# Patient Record
Sex: FEMALE | Race: WHITE | Employment: FULL TIME
[De-identification: names, ages, dates, MRNs, and addresses within clinical notes are randomized per-mention and may not be internally consistent; named-entity substitution may affect disease eponyms.]

---

## 2017-01-03 ENCOUNTER — OFFICE VISIT (OUTPATIENT)
Dept: PODIATRY | Facility: CLINIC | Age: 52
End: 2017-01-03

## 2017-01-03 VITALS
DIASTOLIC BLOOD PRESSURE: 78 MMHG | TEMPERATURE: 97.5 F | SYSTOLIC BLOOD PRESSURE: 132 MMHG | HEART RATE: 69 BPM | HEIGHT: 68 IN | RESPIRATION RATE: 20 BRPM

## 2017-01-03 DIAGNOSIS — Z78.9 PRESENCE OF SURGICAL INCISION: Primary | ICD-10-CM

## 2017-01-03 DIAGNOSIS — Z48.02 VISIT FOR SUTURE REMOVAL: ICD-10-CM

## 2017-01-03 PROCEDURE — 99024 POSTOP FOLLOW-UP VISIT: CPT | Performed by: PODIATRIST

## 2017-01-10 ENCOUNTER — OFFICE VISIT (OUTPATIENT)
Dept: PODIATRY | Facility: CLINIC | Age: 52
End: 2017-01-10

## 2017-01-10 VITALS
HEIGHT: 68 IN | HEART RATE: 75 BPM | RESPIRATION RATE: 18 BRPM | DIASTOLIC BLOOD PRESSURE: 84 MMHG | SYSTOLIC BLOOD PRESSURE: 130 MMHG | TEMPERATURE: 97.3 F

## 2017-01-10 DIAGNOSIS — Z47.89 ORTHOPEDIC AFTERCARE: ICD-10-CM

## 2017-01-10 DIAGNOSIS — Z78.9 PRESENCE OF SURGICAL INCISION: Primary | ICD-10-CM

## 2017-01-10 PROCEDURE — 29580 STRAPPING UNNA BOOT: CPT | Performed by: PODIATRIST

## 2017-01-10 PROCEDURE — 99024 POSTOP FOLLOW-UP VISIT: CPT | Performed by: PODIATRIST

## 2017-01-18 ENCOUNTER — OFFICE VISIT (OUTPATIENT)
Dept: PODIATRY | Facility: CLINIC | Age: 52
End: 2017-01-18

## 2017-01-18 VITALS
HEIGHT: 68 IN | HEART RATE: 76 BPM | DIASTOLIC BLOOD PRESSURE: 72 MMHG | TEMPERATURE: 97 F | SYSTOLIC BLOOD PRESSURE: 101 MMHG | RESPIRATION RATE: 18 BRPM

## 2017-01-18 DIAGNOSIS — Z47.89 ORTHOPEDIC AFTERCARE: ICD-10-CM

## 2017-01-18 DIAGNOSIS — Z78.9 PRESENCE OF SURGICAL INCISION: Primary | ICD-10-CM

## 2017-01-18 PROCEDURE — 99024 POSTOP FOLLOW-UP VISIT: CPT | Performed by: PODIATRIST

## 2017-01-31 ENCOUNTER — OFFICE VISIT (OUTPATIENT)
Dept: PODIATRY | Facility: CLINIC | Age: 52
End: 2017-01-31

## 2017-01-31 VITALS
DIASTOLIC BLOOD PRESSURE: 72 MMHG | HEIGHT: 68 IN | RESPIRATION RATE: 18 BRPM | HEART RATE: 73 BPM | TEMPERATURE: 96.5 F | SYSTOLIC BLOOD PRESSURE: 130 MMHG

## 2017-01-31 DIAGNOSIS — Z47.89 ORTHOPEDIC AFTERCARE: ICD-10-CM

## 2017-01-31 DIAGNOSIS — Z78.9 PRESENCE OF SURGICAL INCISION: Primary | ICD-10-CM

## 2017-01-31 PROCEDURE — 99024 POSTOP FOLLOW-UP VISIT: CPT | Performed by: PODIATRIST

## 2017-04-24 ENCOUNTER — APPOINTMENT (OUTPATIENT)
Dept: GENERAL RADIOLOGY | Age: 52
End: 2017-04-24
Payer: COMMERCIAL

## 2017-04-24 ENCOUNTER — HOSPITAL ENCOUNTER (EMERGENCY)
Age: 52
Discharge: HOME | End: 2017-04-24
Attending: FAMILY MEDICINE
Payer: COMMERCIAL

## 2017-04-24 VITALS
RESPIRATION RATE: 15 BRPM | OXYGEN SATURATION: 99 % | DIASTOLIC BLOOD PRESSURE: 75 MMHG | SYSTOLIC BLOOD PRESSURE: 152 MMHG | HEART RATE: 78 BPM
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
## 2017-04-24 DIAGNOSIS — S82.811A CLOSED TORUS FRACTURE OF PROXIMAL END OF RIGHT FIBULA, INITIAL ENCOUNTER: Primary | ICD-10-CM
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
## 2017-04-24 DIAGNOSIS — M25.511 ACUTE PAIN OF RIGHT SHOULDER: ICD-10-CM

## 2017-04-24 PROCEDURE — 29515 APPLICATION SHORT LEG SPLINT: CPT

## 2017-04-24 PROCEDURE — 99283 EMERGENCY DEPT VISIT LOW MDM: CPT

## 2017-04-24 PROCEDURE — 73560 X-RAY EXAM OF KNEE 1 OR 2: CPT

## 2017-04-24 RX ORDER — HYDROCODONE BITARTRATE AND ACETAMINOPHEN 5; 325 MG/1; MG/1
1 TABLET ORAL EVERY 6 HOURS PRN
Qty: 15 TABLET | Refills: 0 | Status: ON HOLD | OUTPATIENT
Start: 2017-04-24 | End: 2017-11-13 | Stop reason: ALTCHOICE

## 2017-04-24 ASSESSMENT — PAIN SCALES - GENERAL: PAINLEVEL_OUTOF10: 5

## 2017-04-24 ASSESSMENT — PAIN DESCRIPTION - ORIENTATION: ORIENTATION_2: RIGHT

## 2017-04-24 ASSESSMENT — PAIN DESCRIPTION - LOCATION
LOCATION: NECK
LOCATION_2: KNEE

## 2017-06-30 ENCOUNTER — HOSPITAL ENCOUNTER (OUTPATIENT)
Dept: GENERAL RADIOLOGY | Age: 52
Discharge: HOME | End: 2017-06-30
Payer: COMMERCIAL

## 2017-06-30 ENCOUNTER — OFFICE VISIT (OUTPATIENT)
Dept: PODIATRY | Age: 52
End: 2017-06-30
Payer: COMMERCIAL

## 2017-06-30 ENCOUNTER — HOSPITAL ENCOUNTER (OUTPATIENT)
Age: 52
Discharge: HOME | End: 2017-06-30
Payer: COMMERCIAL

## 2017-06-30 VITALS
SYSTOLIC BLOOD PRESSURE: 162 MMHG | HEIGHT: 68 IN | TEMPERATURE: 97.4 F | DIASTOLIC BLOOD PRESSURE: 94 MMHG | RESPIRATION RATE: 18 BRPM | HEART RATE: 79 BPM

## 2017-06-30 DIAGNOSIS — M79.672 PAIN IN LEFT FOOT: ICD-10-CM

## 2017-06-30 DIAGNOSIS — Z47.89 ORTHOPEDIC AFTERCARE: Primary | ICD-10-CM

## 2017-06-30 DIAGNOSIS — Z47.89 ORTHOPEDIC AFTERCARE: ICD-10-CM

## 2017-06-30 PROCEDURE — 99212 OFFICE O/P EST SF 10 MIN: CPT | Performed by: PODIATRIST

## 2017-06-30 PROCEDURE — 73630 X-RAY EXAM OF FOOT: CPT

## 2017-07-31 PROBLEM — Z12.11 COLON CANCER SCREENING: Status: ACTIVE | Noted: 2017-07-31

## 2017-08-07 ENCOUNTER — HOSPITAL ENCOUNTER (OUTPATIENT)
Dept: SLEEP CENTER | Age: 52
Discharge: HOME | End: 2017-08-07
Payer: COMMERCIAL

## 2017-08-07 PROCEDURE — 95810 POLYSOM 6/> YRS 4/> PARAM: CPT

## 2017-08-07 ASSESSMENT — SLEEP AND FATIGUE QUESTIONNAIRES
HOW LIKELY ARE YOU TO NOD OFF OR FALL ASLEEP WHILE SITTING AND READING: 1
ESS TOTAL SCORE: 6
HOW LIKELY ARE YOU TO NOD OFF OR FALL ASLEEP WHILE SITTING AND TALKING TO SOMEONE: 0
HOW LIKELY ARE YOU TO NOD OFF OR FALL ASLEEP WHILE SITTING INACTIVE IN A PUBLIC PLACE: 0
HOW LIKELY ARE YOU TO NOD OFF OR FALL ASLEEP IN A CAR, WHILE STOPPED FOR A FEW MINUTES IN TRAFFIC: 0
HOW LIKELY ARE YOU TO NOD OFF OR FALL ASLEEP WHILE SITTING QUIETLY AFTER LUNCH WITHOUT ALCOHOL: 0
HOW LIKELY ARE YOU TO NOD OFF OR FALL ASLEEP WHILE LYING DOWN TO REST IN THE AFTERNOON WHEN CIRCUMSTANCES PERMIT: 2
HOW LIKELY ARE YOU TO NOD OFF OR FALL ASLEEP WHILE WATCHING TV: 0
HOW LIKELY ARE YOU TO NOD OFF OR FALL ASLEEP WHEN YOU ARE A PASSENGER IN A CAR FOR AN HOUR WITHOUT A BREAK: 3

## 2017-09-06 ENCOUNTER — HOSPITAL ENCOUNTER (OUTPATIENT)
Dept: SLEEP CENTER | Age: 52
Discharge: HOME | End: 2017-09-06
Payer: COMMERCIAL

## 2017-09-06 DIAGNOSIS — G47.30 SLEEP APNEA, UNSPECIFIED TYPE: ICD-10-CM

## 2017-09-06 PROCEDURE — 95811 POLYSOM 6/>YRS CPAP 4/> PARM: CPT

## 2017-09-12 ENCOUNTER — HOSPITAL ENCOUNTER (OUTPATIENT)
Dept: WOMENS IMAGING | Age: 52
Discharge: HOME | End: 2017-09-12
Payer: COMMERCIAL

## 2017-09-12 ENCOUNTER — HOSPITAL ENCOUNTER (OUTPATIENT)
Age: 52
Discharge: HOME | End: 2017-09-12
Payer: COMMERCIAL

## 2017-09-12 DIAGNOSIS — Z00.00 WELLNESS EXAMINATION: ICD-10-CM

## 2017-09-12 LAB
ANION GAP SERPL CALCULATED.3IONS-SCNC: 13 MMOL/L (ref 9–17)
CHLORIDE BLD-SCNC: 103 MMOL/L (ref 98–107)
CO2: 28 MMOL/L (ref 20–31)
CREAT SERPL-MCNC: 1.2 MG/DL (ref 0.5–0.9)
EKG ATRIAL RATE: 80 BPM
EKG P AXIS: 48 DEGREES
EKG P-R INTERVAL: 166 MS
EKG Q-T INTERVAL: 360 MS
EKG QRS DURATION: 98 MS
EKG QTC CALCULATION (BAZETT): 415 MS
EKG R AXIS: -17 DEGREES
EKG T AXIS: 38 DEGREES
EKG VENTRICULAR RATE: 80 BPM
GFR AFRICAN AMERICAN: 57 ML/MIN
GFR NON-AFRICAN AMERICAN: 47 ML/MIN
GFR SERPL CREATININE-BSD FRML MDRD: ABNORMAL ML/MIN/{1.73_M2}
GFR SERPL CREATININE-BSD FRML MDRD: ABNORMAL ML/MIN/{1.73_M2}
GLUCOSE BLD-MCNC: 125 MG/DL (ref 70–99)
HCT VFR BLD CALC: 39.1 % (ref 36–46)
POTASSIUM SERPL-SCNC: 4.9 MMOL/L (ref 3.7–5.3)
SODIUM BLD-SCNC: 144 MMOL/L (ref 135–144)

## 2017-09-12 PROCEDURE — 85014 HEMATOCRIT: CPT

## 2017-09-12 PROCEDURE — 36415 COLL VENOUS BLD VENIPUNCTURE: CPT

## 2017-09-12 PROCEDURE — G0202 SCR MAMMO BI INCL CAD: HCPCS

## 2017-09-12 PROCEDURE — 80051 ELECTROLYTE PANEL: CPT

## 2017-09-12 PROCEDURE — 82947 ASSAY GLUCOSE BLOOD QUANT: CPT

## 2017-09-12 PROCEDURE — 82565 ASSAY OF CREATININE: CPT

## 2017-09-12 PROCEDURE — 93005 ELECTROCARDIOGRAM TRACING: CPT

## 2017-11-13 ENCOUNTER — ANESTHESIA (OUTPATIENT)
Dept: OPERATING ROOM | Age: 52
End: 2017-11-13
Payer: COMMERCIAL

## 2017-11-13 ENCOUNTER — ANESTHESIA EVENT (OUTPATIENT)
Dept: OPERATING ROOM | Age: 52
End: 2017-11-13
Payer: COMMERCIAL

## 2017-11-13 ENCOUNTER — HOSPITAL ENCOUNTER (OUTPATIENT)
Age: 52
Setting detail: OUTPATIENT SURGERY
Discharge: HOME | End: 2017-11-13
Attending: INTERNAL MEDICINE | Admitting: INTERNAL MEDICINE
Payer: COMMERCIAL

## 2017-11-13 VITALS
SYSTOLIC BLOOD PRESSURE: 95 MMHG | DIASTOLIC BLOOD PRESSURE: 49 MMHG | RESPIRATION RATE: 22 BRPM | OXYGEN SATURATION: 99 %

## 2017-11-13 VITALS
BODY MASS INDEX: 38.95 KG/M2 | DIASTOLIC BLOOD PRESSURE: 83 MMHG | HEIGHT: 68 IN | TEMPERATURE: 96.8 F | RESPIRATION RATE: 18 BRPM | HEART RATE: 70 BPM | SYSTOLIC BLOOD PRESSURE: 132 MMHG | WEIGHT: 257 LBS | OXYGEN SATURATION: 99 %

## 2017-11-13 LAB — GLUCOSE BLD-MCNC: 90 MG/DL (ref 74–100)

## 2017-11-13 PROCEDURE — 7100000010 HC PHASE II RECOVERY - FIRST 15 MIN: Performed by: INTERNAL MEDICINE

## 2017-11-13 PROCEDURE — 6360000002 HC RX W HCPCS: Performed by: NURSE ANESTHETIST, CERTIFIED REGISTERED

## 2017-11-13 PROCEDURE — 7100000011 HC PHASE II RECOVERY - ADDTL 15 MIN: Performed by: INTERNAL MEDICINE

## 2017-11-13 PROCEDURE — 2500000003 HC RX 250 WO HCPCS: Performed by: NURSE ANESTHETIST, CERTIFIED REGISTERED

## 2017-11-13 PROCEDURE — 3609027000 HC COLONOSCOPY: Performed by: INTERNAL MEDICINE

## 2017-11-13 PROCEDURE — 82947 ASSAY GLUCOSE BLOOD QUANT: CPT

## 2017-11-13 PROCEDURE — 45378 DIAGNOSTIC COLONOSCOPY: CPT | Performed by: INTERNAL MEDICINE

## 2017-11-13 PROCEDURE — 2580000003 HC RX 258: Performed by: INTERNAL MEDICINE

## 2017-11-13 PROCEDURE — 3700000000 HC ANESTHESIA ATTENDED CARE: Performed by: INTERNAL MEDICINE

## 2017-11-13 PROCEDURE — 3700000001 HC ADD 15 MINUTES (ANESTHESIA): Performed by: INTERNAL MEDICINE

## 2017-11-13 RX ORDER — SODIUM CHLORIDE, SODIUM LACTATE, POTASSIUM CHLORIDE, CALCIUM CHLORIDE 600; 310; 30; 20 MG/100ML; MG/100ML; MG/100ML; MG/100ML
INJECTION, SOLUTION INTRAVENOUS CONTINUOUS
Status: DISCONTINUED | OUTPATIENT
Start: 2017-11-13 | End: 2017-11-13 | Stop reason: HOSPADM

## 2017-11-13 RX ORDER — PROPOFOL 10 MG/ML
INJECTION, EMULSION INTRAVENOUS PRN
Status: DISCONTINUED | OUTPATIENT
Start: 2017-11-13 | End: 2017-11-13 | Stop reason: SDUPTHER

## 2017-11-13 RX ORDER — MIDAZOLAM HYDROCHLORIDE 1 MG/ML
INJECTION INTRAMUSCULAR; INTRAVENOUS PRN
Status: DISCONTINUED | OUTPATIENT
Start: 2017-11-13 | End: 2017-11-13 | Stop reason: SDUPTHER

## 2017-11-13 RX ORDER — LIDOCAINE HYDROCHLORIDE 20 MG/ML
INJECTION, SOLUTION INFILTRATION; PERINEURAL PRN
Status: DISCONTINUED | OUTPATIENT
Start: 2017-11-13 | End: 2017-11-13 | Stop reason: SDUPTHER

## 2017-11-13 RX ORDER — FENTANYL CITRATE 50 UG/ML
INJECTION, SOLUTION INTRAMUSCULAR; INTRAVENOUS PRN
Status: DISCONTINUED | OUTPATIENT
Start: 2017-11-13 | End: 2017-11-13 | Stop reason: SDUPTHER

## 2017-11-13 RX ADMIN — PROPOFOL 50 MG: 10 INJECTION, EMULSION INTRAVENOUS at 08:20

## 2017-11-13 RX ADMIN — PROPOFOL 25 MG: 10 INJECTION, EMULSION INTRAVENOUS at 08:15

## 2017-11-13 RX ADMIN — FENTANYL CITRATE 25 MCG: 50 INJECTION INTRAMUSCULAR; INTRAVENOUS at 08:04

## 2017-11-13 RX ADMIN — PROPOFOL 30 MG: 10 INJECTION, EMULSION INTRAVENOUS at 08:09

## 2017-11-13 RX ADMIN — PROPOFOL 50 MG: 10 INJECTION, EMULSION INTRAVENOUS at 08:04

## 2017-11-13 RX ADMIN — LIDOCAINE HYDROCHLORIDE 100 MG: 20 INJECTION, SOLUTION INFILTRATION; PERINEURAL at 08:04

## 2017-11-13 RX ADMIN — FENTANYL CITRATE 25 MCG: 50 INJECTION INTRAMUSCULAR; INTRAVENOUS at 08:14

## 2017-11-13 RX ADMIN — PROPOFOL 50 MG: 10 INJECTION, EMULSION INTRAVENOUS at 08:17

## 2017-11-13 RX ADMIN — PROPOFOL 50 MG: 10 INJECTION, EMULSION INTRAVENOUS at 08:13

## 2017-11-13 RX ADMIN — PROPOFOL 50 MG: 10 INJECTION, EMULSION INTRAVENOUS at 08:07

## 2017-11-13 RX ADMIN — MIDAZOLAM HYDROCHLORIDE 2 MG: 1 INJECTION, SOLUTION INTRAMUSCULAR; INTRAVENOUS at 08:04

## 2017-11-13 RX ADMIN — SODIUM CHLORIDE, POTASSIUM CHLORIDE, SODIUM LACTATE AND CALCIUM CHLORIDE: 600; 310; 30; 20 INJECTION, SOLUTION INTRAVENOUS at 07:46

## 2017-11-13 RX ADMIN — FENTANYL CITRATE 50 MCG: 50 INJECTION INTRAMUSCULAR; INTRAVENOUS at 08:08

## 2017-11-13 ASSESSMENT — PAIN SCALES - GENERAL
PAINLEVEL_OUTOF10: 0

## 2017-11-13 ASSESSMENT — PAIN - FUNCTIONAL ASSESSMENT: PAIN_FUNCTIONAL_ASSESSMENT: 0-10

## 2018-01-05 ENCOUNTER — OFFICE VISIT (OUTPATIENT)
Dept: PRIMARY CARE CLINIC | Age: 53
End: 2018-01-05
Payer: COMMERCIAL

## 2018-01-05 ENCOUNTER — HOSPITAL ENCOUNTER (OUTPATIENT)
Age: 53
Setting detail: SPECIMEN
Discharge: HOME | End: 2018-01-05
Payer: COMMERCIAL

## 2018-01-05 VITALS
RESPIRATION RATE: 20 BRPM | SYSTOLIC BLOOD PRESSURE: 121 MMHG | HEART RATE: 87 BPM | WEIGHT: 263.7 LBS | BODY MASS INDEX: 40.1 KG/M2 | TEMPERATURE: 100.3 F | DIASTOLIC BLOOD PRESSURE: 77 MMHG

## 2018-01-05 DIAGNOSIS — J02.9 SORE THROAT: ICD-10-CM

## 2018-01-05 DIAGNOSIS — J02.9 SORE THROAT: Primary | ICD-10-CM

## 2018-01-05 DIAGNOSIS — J10.1 INFLUENZA A: ICD-10-CM

## 2018-01-05 LAB
INFLUENZA A ANTIBODY: POSITIVE
INFLUENZA B ANTIBODY: NEGATIVE
S PYO AG THROAT QL: NORMAL

## 2018-01-05 PROCEDURE — 87880 STREP A ASSAY W/OPTIC: CPT | Performed by: NURSE PRACTITIONER

## 2018-01-05 PROCEDURE — 99213 OFFICE O/P EST LOW 20 MIN: CPT | Performed by: NURSE PRACTITIONER

## 2018-01-05 PROCEDURE — 87651 STREP A DNA AMP PROBE: CPT

## 2018-01-05 PROCEDURE — 87804 INFLUENZA ASSAY W/OPTIC: CPT | Performed by: NURSE PRACTITIONER

## 2018-01-05 RX ORDER — OSELTAMIVIR PHOSPHATE 75 MG/1
75 CAPSULE ORAL 2 TIMES DAILY
Qty: 10 CAPSULE | Refills: 0 | Status: SHIPPED | OUTPATIENT
Start: 2018-01-05 | End: 2018-01-05 | Stop reason: DRUGHIGH

## 2018-01-05 RX ORDER — OSELTAMIVIR PHOSPHATE 30 MG/1
30 CAPSULE ORAL 2 TIMES DAILY
Qty: 10 CAPSULE | Refills: 0 | Status: SHIPPED | OUTPATIENT
Start: 2018-01-05 | End: 2019-08-14 | Stop reason: ALTCHOICE

## 2018-01-05 ASSESSMENT — ENCOUNTER SYMPTOMS
COUGH: 1
ABDOMINAL PAIN: 0
WHEEZING: 0
EYES NEGATIVE: 1
TROUBLE SWALLOWING: 0
VOMITING: 0
GASTROINTESTINAL NEGATIVE: 1
RHINORRHEA: 1
SORE THROAT: 1
SHORTNESS OF BREATH: 0

## 2018-01-05 NOTE — PATIENT INSTRUCTIONS
Patient Education        Influenza (Flu): Care Instructions  Your Care Instructions    Influenza (flu) is an infection in the lungs and breathing passages. It is caused by the influenza virus. There are different strains, or types, of the flu virus from year to year. Unlike the common cold, the flu comes on suddenly and the symptoms, such as a cough, congestion, fever, chills, fatigue, aches, and pains, are more severe. These symptoms may last up to 10 days. Although the flu can make you feel very sick, it usually doesn't cause serious health problems. Home treatment is usually all you need for flu symptoms. But your doctor may prescribe antiviral medicine to prevent other health problems, such as pneumonia, from developing. Older people and those who have a long-term health condition, such as lung disease, are most at risk for having pneumonia or other health problems. Follow-up care is a key part of your treatment and safety. Be sure to make and go to all appointments, and call your doctor if you are having problems. It's also a good idea to know your test results and keep a list of the medicines you take. How can you care for yourself at home? · Get plenty of rest.  · Drink plenty of fluids, enough so that your urine is light yellow or clear like water. If you have kidney, heart, or liver disease and have to limit fluids, talk with your doctor before you increase the amount of fluids you drink. · Take an over-the-counter pain medicine if needed, such as acetaminophen (Tylenol), ibuprofen (Advil, Motrin), or naproxen (Aleve), to relieve fever, headache, and muscle aches. Read and follow all instructions on the label. No one younger than 20 should take aspirin. It has been linked to Reye syndrome, a serious illness. · Do not smoke. Smoking can make the flu worse. If you need help quitting, talk to your doctor about stop-smoking programs and medicines.  These can increase your chances of quitting for your doctor if:  ? · You begin to get better and then get worse. ? · You are not getting better after 1 week. Where can you learn more? Go to https://IntelliGeneScanpepiceweb.[employer]. org and sign in to your Learn It Live account. Enter P629 in the [employer] box to learn more about \"Influenza (Flu): Care Instructions. \"     If you do not have an account, please click on the \"Sign Up Now\" link. Current as of: May 12, 2017  Content Version: 11.5  © 4615-0932 Healthwise, Incorporated. Care instructions adapted under license by [facility] ([facility]). If you have questions about a medical condition or this instruction, always ask your healthcare professional. [address] any warranty or liability for your use of this information.

## 2018-01-05 NOTE — LETTER
[address] [address]  Phone: [phone]  Fax: [phone] Ami Maher NP        January 5, 2018     Patient: Magui Meyer   YOB: 1965   Date of Visit: 1/5/2018       To Whom it May Concern:    Gabriella Noriega was seen in my clinic on 1/5/2018. She may return to work on 01/08/2018. If you have any questions or concerns, please don't hesitate to call.     Sincerely,         Drea Fuentes NP

## 2018-01-05 NOTE — PROGRESS NOTES
[facility] PHYSICIANS  [facility] PRIMARY [facility]  [address] [facility] [address]  Dept: [phone]  Dept Fax: [phone]    Magui Meyer is a 47 y.o. female who presents to the [facility] today for her medical conditions/complaints as noted below. Magui Meyer is c/o of Pharyngitis      HPI:     Denies recent antibiotic use. Denies heart or lung problems. Denies kidney or liver problems. Pharyngitis   This is a new problem. Episode onset: Wednesday, 2 days  The problem occurs constantly. The problem has been gradually worsening. Associated symptoms include chills, coughing, fatigue and a sore throat. Pertinent negatives include no abdominal pain, congestion, fever, headaches, myalgias, neck pain, rash or vomiting. Associated symptoms comments: Daughter positive with strep. The symptoms are aggravated by eating and drinking. Treatments tried: Theraflu, cough drops and throat spray. The treatment provided mild relief.        Past Medical History:   Diagnosis Date    Endometriosis     Headache(784.0)     Hyperlipidemia     Hypertension     Obesity     Restless legs syndrome     Type 2 diabetes mellitus (HCC)         Current Outpatient Prescriptions   Medication Sig Dispense Refill    oseltamivir (TAMIFLU) 30 MG capsule Take 1 capsule by mouth 2 times daily for 5 days 10 capsule 0    losartan (COZAAR) 100 MG tablet TAKE ONE TABLET BY MOUTH DAILY 30 tablet 9    L-Methylfolate-Algae-B12-B6 (METANX PO) Take by mouth daily      MELATONIN PO Take 10 mg by mouth      allopurinol (ZYLOPRIM) 100 MG tablet TAKE ONE TABLET BY MOUTH DAILY 30 tablet 11    aspirin 81 MG tablet Take 81 mg by mouth daily      Calcium-Vitamin D (CALTRATE 600 PLUS-VIT D PO) Take 1 tablet by mouth daily      Multiple Vitamins-Minerals (THERAPEUTIC MULTIVITAMIN-MINERALS) tablet Take 1 tablet by mouth daily      b complex vitamins capsule Take 1 capsule by mouth daily      vitamin D (CHOLECALCIFEROL) 1000 UNIT TABS tablet Take 1,000 Units by mouth daily      Insulin Glargine (TOUJEO SOLOSTAR) 300 UNIT/ML SOPN Inject 50 Units into the skin nightly  3 Pen     imipramine (TOFRANIL) 50 MG tablet Take 75 mg by mouth nightly       atorvastatin (LIPITOR) 80 MG tablet TAKE ONE TABLET BY MOUTH EVERY DAY (Patient taking differently: 40 mg TAKE ONE TABLET BY MOUTH EVERY DAY) 30 tablet 11    INVOKANA 300 MG TABS tablet Take 300 mg by mouth daily       ONE TOUCH ULTRA TEST strip 1 each daily       LYRICA 300 MG capsule Take 300 mg by mouth nightly       metoprolol (TOPROL-XL) 50 MG XL tablet Take 50 mg by mouth 2 times daily       metFORMIN ER (GLUCOPHAGE-XR) 500 MG XR tablet Take 1,000 mg by mouth 2 times daily       CLICKFINE PEN NEEDLES 31G X 8 MM MISC 1 each daily       exenatide (BYETTA 10 MCG PEN) 10 MCG/0.04ML injection Inject 10 mcg into the skin 2 times daily (with meals)       glimepiride (AMARYL) 4 MG tablet Take 8 mg by mouth every morning (before breakfast)        No current facility-administered medications for this visit. No Known Allergies    Subjective:      Review of Systems   Constitutional: Positive for activity change, appetite change, chills and fatigue. Negative for fever. HENT: Positive for rhinorrhea and sore throat. Negative for congestion, ear discharge, ear pain, sinus pain, sinus pressure, sneezing and trouble swallowing. Eyes: Negative. Negative for visual disturbance. Respiratory: Positive for cough. Negative for shortness of breath and wheezing. Cardiovascular: Negative. Gastrointestinal: Negative. Negative for abdominal pain and vomiting. Genitourinary: Negative. Musculoskeletal: Negative. Negative for myalgias and neck pain. Skin: Negative. Negative for rash. Neurological: Negative. Negative for headaches. Objective:     Physical Exam   Constitutional: She is oriented to person, place, and time.  She appears well-developed and well-nourished. She is cooperative. Non-toxic appearance. She appears ill. No distress. Low grade fever in office  Appears well hydrated and non toxic. Sitting upright on exam table without distress. Respirations are regular, non labored and quiet. HENT:   Head: Normocephalic and atraumatic. Right Ear: Tympanic membrane, external ear and ear canal normal.   Left Ear: Tympanic membrane, external ear and ear canal normal.   Nose: Mucosal edema and rhinorrhea present. Right sinus exhibits no maxillary sinus tenderness and no frontal sinus tenderness. Left sinus exhibits no maxillary sinus tenderness and no frontal sinus tenderness. Mouth/Throat: Uvula is midline. No trismus in the jaw. No uvula swelling. Posterior oropharyngeal erythema present. No oropharyngeal exudate or posterior oropharyngeal edema. Eyes: Conjunctivae and EOM are normal. Pupils are equal, round, and reactive to light. Neck: Normal range of motion. Neck supple. No tracheal deviation present. No thyromegaly present. Cardiovascular: Normal rate, regular rhythm, normal heart sounds and intact distal pulses. Exam reveals no gallop and no friction rub. No murmur heard. Pulses:       Radial pulses are 2+ on the left side. Pulmonary/Chest: Effort normal and breath sounds normal. No accessory muscle usage. No tachypnea. No respiratory distress. She has no decreased breath sounds. She has no wheezes. She has no rhonchi. She has no rales. No cough, no wheeze, no distress  Breath sounds are clear to auscultation over posterior bilateral fields. Chest expansion is symmetrical with non-restricted air movement. Musculoskeletal: Normal range of motion. She exhibits no edema or tenderness. Lymphadenopathy:     She has no cervical adenopathy. Neurological: She is alert and oriented to person, place, and time. No cranial nerve deficit. She exhibits normal muscle tone. Coordination normal.   Skin: Skin is warm and dry.  No rash (around 1/7/2018). Orders Placed This Encounter   Medications    DISCONTD: oseltamivir (TAMIFLU) 75 MG capsule     Sig: Take 1 capsule by mouth 2 times daily for 5 days     Dispense:  10 capsule     Refill:  0    oseltamivir (TAMIFLU) 30 MG capsule     Sig: Take 1 capsule by mouth 2 times daily for 5 days     Dispense:  10 capsule     Refill:  0        She is asked to follow-up if symptoms persist or worsen. All patient questions answered. Pt voiced understanding.       Electronically signed by Pippa Maloney NP on 1/8/2018 at 9:57 AM

## 2018-01-06 LAB
DIRECT EXAM: NORMAL
DIRECT EXAM: NORMAL
Lab: NORMAL
SPECIMEN DESCRIPTION: NORMAL
SPECIMEN DESCRIPTION: NORMAL
STATUS: NORMAL

## 2018-01-08 ENCOUNTER — TELEPHONE (OUTPATIENT)
Dept: PRIMARY CARE CLINIC | Age: 53
End: 2018-01-08

## 2018-01-08 ASSESSMENT — ENCOUNTER SYMPTOMS
SINUS PAIN: 0
SINUS PRESSURE: 0

## 2018-01-30 ENCOUNTER — HOSPITAL ENCOUNTER (OUTPATIENT)
Dept: GENERAL RADIOLOGY | Age: 53
Discharge: HOME | DRG: 617 | End: 2018-02-01
Payer: COMMERCIAL

## 2018-01-30 ENCOUNTER — HOSPITAL ENCOUNTER (OUTPATIENT)
Age: 53
Discharge: HOME | DRG: 617 | End: 2018-02-01
Payer: COMMERCIAL

## 2018-01-30 ENCOUNTER — OFFICE VISIT (OUTPATIENT)
Dept: PODIATRY | Age: 53
End: 2018-01-30
Payer: COMMERCIAL

## 2018-01-30 ENCOUNTER — HOSPITAL ENCOUNTER (INPATIENT)
Age: 53
LOS: 3 days | Discharge: HOME | DRG: 617 | End: 2018-02-02
Attending: PODIATRIST | Admitting: PODIATRIST
Payer: COMMERCIAL

## 2018-01-30 ENCOUNTER — APPOINTMENT (OUTPATIENT)
Dept: GENERAL RADIOLOGY | Age: 53
DRG: 617 | End: 2018-01-30
Attending: PODIATRIST
Payer: COMMERCIAL

## 2018-01-30 ENCOUNTER — APPOINTMENT (OUTPATIENT)
Dept: LAB | Age: 53
DRG: 617 | End: 2018-01-30
Attending: PODIATRIST
Payer: COMMERCIAL

## 2018-01-30 VITALS
DIASTOLIC BLOOD PRESSURE: 77 MMHG | HEART RATE: 76 BPM | BODY MASS INDEX: 39.83 KG/M2 | SYSTOLIC BLOOD PRESSURE: 131 MMHG | WEIGHT: 262.8 LBS | TEMPERATURE: 97.6 F | HEIGHT: 68 IN

## 2018-01-30 DIAGNOSIS — L02.611 ABSCESS OF GREAT TOE OF RIGHT FOOT: ICD-10-CM

## 2018-01-30 DIAGNOSIS — M86.171 ACUTE OSTEOMYELITIS OF TOE OF RIGHT FOOT (HCC): ICD-10-CM

## 2018-01-30 DIAGNOSIS — L02.611 ABSCESS OF GREAT TOE OF RIGHT FOOT: Primary | ICD-10-CM

## 2018-01-30 DIAGNOSIS — M86.9 OSTEOMYELITIS OF RIGHT FOOT, UNSPECIFIED TYPE (HCC): ICD-10-CM

## 2018-01-30 PROBLEM — N39.0 UTI (URINARY TRACT INFECTION): Status: ACTIVE | Noted: 2018-01-30

## 2018-01-30 LAB
-: ABNORMAL
ALBUMIN SERPL-MCNC: 3.7 G/DL (ref 3.5–5.2)
ALBUMIN/GLOBULIN RATIO: 1.1 (ref 1–2.5)
ALP BLD-CCNC: 132 U/L (ref 35–104)
ALT SERPL-CCNC: 18 U/L (ref 5–33)
AMORPHOUS: ABNORMAL
ANION GAP SERPL CALCULATED.3IONS-SCNC: 15 MMOL/L (ref 9–17)
AST SERPL-CCNC: 16 U/L
BACTERIA: ABNORMAL
BILIRUB SERPL-MCNC: 0.45 MG/DL (ref 0.3–1.2)
BILIRUBIN URINE: NEGATIVE
BUN BLDV-MCNC: 37 MG/DL (ref 6–20)
BUN/CREAT BLD: 28 (ref 9–20)
CALCIUM SERPL-MCNC: 9.8 MG/DL (ref 8.6–10.4)
CASTS UA: ABNORMAL /LPF
CHLORIDE BLD-SCNC: 98 MMOL/L (ref 98–107)
CO2: 27 MMOL/L (ref 20–31)
COLOR: YELLOW
COMMENT UA: ABNORMAL
CREAT SERPL-MCNC: 1.33 MG/DL (ref 0.5–0.9)
CRYSTALS, UA: ABNORMAL /HPF
EKG ATRIAL RATE: 71 BPM
EKG P AXIS: 23 DEGREES
EKG P-R INTERVAL: 158 MS
EKG Q-T INTERVAL: 382 MS
EKG QRS DURATION: 96 MS
EKG QTC CALCULATION (BAZETT): 415 MS
EKG R AXIS: -5 DEGREES
EKG T AXIS: 2 DEGREES
EKG VENTRICULAR RATE: 71 BPM
EPITHELIAL CELLS UA: ABNORMAL /HPF (ref 0–25)
GFR AFRICAN AMERICAN: 51 ML/MIN
GFR NON-AFRICAN AMERICAN: 42 ML/MIN
GFR SERPL CREATININE-BSD FRML MDRD: ABNORMAL ML/MIN/{1.73_M2}
GFR SERPL CREATININE-BSD FRML MDRD: ABNORMAL ML/MIN/{1.73_M2}
GLUCOSE BLD-MCNC: 100 MG/DL (ref 70–99)
GLUCOSE BLD-MCNC: 145 MG/DL (ref 74–100)
GLUCOSE BLD-MCNC: 162 MG/DL (ref 74–100)
GLUCOSE URINE: ABNORMAL
KETONES, URINE: NEGATIVE
LACTIC ACID, WHOLE BLOOD: NORMAL MMOL/L (ref 0.7–2.1)
LACTIC ACID: 1.4 MMOL/L (ref 0.5–2.2)
LEUKOCYTE ESTERASE, URINE: ABNORMAL
MUCUS: ABNORMAL
NITRITE, URINE: POSITIVE
OTHER OBSERVATIONS UA: ABNORMAL
PH UA: 5.5 (ref 5–9)
POTASSIUM SERPL-SCNC: 4.7 MMOL/L (ref 3.7–5.3)
PROCALCITONIN: 0.14 NG/ML
PROTEIN UA: NEGATIVE
RBC UA: ABNORMAL /HPF (ref 0–2)
RENAL EPITHELIAL, UA: ABNORMAL /HPF
SEDIMENTATION RATE, ERYTHROCYTE: 70 MM (ref 0–20)
SODIUM BLD-SCNC: 140 MMOL/L (ref 135–144)
SPECIFIC GRAVITY UA: 1.02 (ref 1.01–1.02)
TOTAL PROTEIN: 7.2 G/DL (ref 6.4–8.3)
TRICHOMONAS: ABNORMAL
TURBIDITY: CLEAR
URINE HGB: NEGATIVE
UROBILINOGEN, URINE: NORMAL
WBC UA: ABNORMAL /HPF (ref 0–5)
YEAST: ABNORMAL

## 2018-01-30 PROCEDURE — 87075 CULTR BACTERIA EXCEPT BLOOD: CPT

## 2018-01-30 PROCEDURE — 81001 URINALYSIS AUTO W/SCOPE: CPT

## 2018-01-30 PROCEDURE — 85651 RBC SED RATE NONAUTOMATED: CPT

## 2018-01-30 PROCEDURE — 87040 BLOOD CULTURE FOR BACTERIA: CPT

## 2018-01-30 PROCEDURE — 71045 X-RAY EXAM CHEST 1 VIEW: CPT

## 2018-01-30 PROCEDURE — 83605 ASSAY OF LACTIC ACID: CPT

## 2018-01-30 PROCEDURE — 6360000002 HC RX W HCPCS: Performed by: PODIATRIST

## 2018-01-30 PROCEDURE — 2580000003 HC RX 258: Performed by: PODIATRIST

## 2018-01-30 PROCEDURE — 6370000000 HC RX 637 (ALT 250 FOR IP): Performed by: PHYSICIAN ASSISTANT

## 2018-01-30 PROCEDURE — 87205 SMEAR GRAM STAIN: CPT

## 2018-01-30 PROCEDURE — 87186 SC STD MICRODIL/AGAR DIL: CPT

## 2018-01-30 PROCEDURE — 73660 X-RAY EXAM OF TOE(S): CPT

## 2018-01-30 PROCEDURE — 10061 I&D ABSCESS COMP/MULTIPLE: CPT | Performed by: PODIATRIST

## 2018-01-30 PROCEDURE — 84145 PROCALCITONIN (PCT): CPT

## 2018-01-30 PROCEDURE — 82947 ASSAY GLUCOSE BLOOD QUANT: CPT

## 2018-01-30 PROCEDURE — 93005 ELECTROCARDIOGRAM TRACING: CPT

## 2018-01-30 PROCEDURE — 87070 CULTURE OTHR SPECIMN AEROBIC: CPT

## 2018-01-30 PROCEDURE — 80053 COMPREHEN METABOLIC PANEL: CPT

## 2018-01-30 PROCEDURE — 86403 PARTICLE AGGLUT ANTBDY SCRN: CPT

## 2018-01-30 PROCEDURE — 1200000000 HC SEMI PRIVATE

## 2018-01-30 PROCEDURE — 36415 COLL VENOUS BLD VENIPUNCTURE: CPT

## 2018-01-30 PROCEDURE — 99222 1ST HOSP IP/OBS MODERATE 55: CPT | Performed by: PODIATRIST

## 2018-01-30 RX ORDER — SODIUM CHLORIDE 0.9 % (FLUSH) 0.9 %
10 SYRINGE (ML) INJECTION EVERY 12 HOURS SCHEDULED
Status: DISCONTINUED | OUTPATIENT
Start: 2018-01-30 | End: 2018-02-02 | Stop reason: HOSPADM

## 2018-01-30 RX ORDER — M-VIT,TX,IRON,MINS/CALC/FOLIC 27MG-0.4MG
1 TABLET ORAL DAILY
Status: DISCONTINUED | OUTPATIENT
Start: 2018-01-31 | End: 2018-02-02 | Stop reason: HOSPADM

## 2018-01-30 RX ORDER — METOPROLOL SUCCINATE 50 MG/1
50 TABLET, EXTENDED RELEASE ORAL 2 TIMES DAILY
Status: DISCONTINUED | OUTPATIENT
Start: 2018-01-30 | End: 2018-02-02 | Stop reason: HOSPADM

## 2018-01-30 RX ORDER — IMIPRAMINE HCL 25 MG
75 TABLET ORAL NIGHTLY
Status: DISCONTINUED | OUTPATIENT
Start: 2018-01-30 | End: 2018-02-02 | Stop reason: HOSPADM

## 2018-01-30 RX ORDER — ONDANSETRON 2 MG/ML
4 INJECTION INTRAMUSCULAR; INTRAVENOUS EVERY 6 HOURS PRN
Status: DISCONTINUED | OUTPATIENT
Start: 2018-01-30 | End: 2018-02-02 | Stop reason: HOSPADM

## 2018-01-30 RX ORDER — ATORVASTATIN CALCIUM 40 MG/1
80 TABLET, FILM COATED ORAL NIGHTLY
Status: DISCONTINUED | OUTPATIENT
Start: 2018-01-30 | End: 2018-02-02 | Stop reason: HOSPADM

## 2018-01-30 RX ORDER — ALLOPURINOL 100 MG/1
100 TABLET ORAL DAILY
Status: DISCONTINUED | OUTPATIENT
Start: 2018-01-30 | End: 2018-02-02 | Stop reason: HOSPADM

## 2018-01-30 RX ORDER — ACETAMINOPHEN 325 MG/1
650 TABLET ORAL EVERY 4 HOURS PRN
Status: DISCONTINUED | OUTPATIENT
Start: 2018-01-30 | End: 2018-02-02 | Stop reason: HOSPADM

## 2018-01-30 RX ORDER — SODIUM CHLORIDE 0.9 % (FLUSH) 0.9 %
10 SYRINGE (ML) INJECTION PRN
Status: DISCONTINUED | OUTPATIENT
Start: 2018-01-30 | End: 2018-02-02 | Stop reason: HOSPADM

## 2018-01-30 RX ORDER — NICOTINE POLACRILEX 4 MG
15 LOZENGE BUCCAL PRN
Status: DISCONTINUED | OUTPATIENT
Start: 2018-01-30 | End: 2018-02-02 | Stop reason: HOSPADM

## 2018-01-30 RX ORDER — DEXTROSE MONOHYDRATE 50 MG/ML
100 INJECTION, SOLUTION INTRAVENOUS PRN
Status: DISCONTINUED | OUTPATIENT
Start: 2018-01-30 | End: 2018-02-02 | Stop reason: HOSPADM

## 2018-01-30 RX ORDER — DEXTROSE MONOHYDRATE 25 G/50ML
12.5 INJECTION, SOLUTION INTRAVENOUS PRN
Status: DISCONTINUED | OUTPATIENT
Start: 2018-01-30 | End: 2018-02-02 | Stop reason: HOSPADM

## 2018-01-30 RX ORDER — PREGABALIN 75 MG/1
300 CAPSULE ORAL NIGHTLY
Status: DISCONTINUED | OUTPATIENT
Start: 2018-01-30 | End: 2018-02-02 | Stop reason: HOSPADM

## 2018-01-30 RX ORDER — LOSARTAN POTASSIUM 50 MG/1
100 TABLET ORAL DAILY
Status: DISCONTINUED | OUTPATIENT
Start: 2018-01-30 | End: 2018-02-02 | Stop reason: HOSPADM

## 2018-01-30 RX ORDER — UREA 10 %
3 LOTION (ML) TOPICAL NIGHTLY PRN
Status: DISCONTINUED | OUTPATIENT
Start: 2018-01-30 | End: 2018-02-02 | Stop reason: HOSPADM

## 2018-01-30 RX ADMIN — IMIPRAMINE HYDROCHLORIDE 75 MG: 25 TABLET ORAL at 20:21

## 2018-01-30 RX ADMIN — ALLOPURINOL 100 MG: 100 TABLET ORAL at 16:00

## 2018-01-30 RX ADMIN — PREGABALIN 300 MG: 75 CAPSULE ORAL at 20:21

## 2018-01-30 RX ADMIN — INSULIN GLARGINE 20 UNITS: 100 INJECTION, SOLUTION SUBCUTANEOUS at 20:26

## 2018-01-30 RX ADMIN — METOPROLOL SUCCINATE 50 MG: 50 TABLET, FILM COATED, EXTENDED RELEASE ORAL at 20:21

## 2018-01-30 RX ADMIN — LOSARTAN POTASSIUM 100 MG: 50 TABLET ORAL at 16:36

## 2018-01-30 RX ADMIN — INSULIN LISPRO 2 UNITS: 100 INJECTION, SOLUTION INTRAVENOUS; SUBCUTANEOUS at 17:21

## 2018-01-30 RX ADMIN — DEXTROSE MONOHYDRATE 3.38 G: 50 INJECTION, SOLUTION INTRAVENOUS at 16:00

## 2018-01-30 RX ADMIN — ATORVASTATIN CALCIUM 80 MG: 40 TABLET, FILM COATED ORAL at 20:21

## 2018-01-30 RX ADMIN — DEXTROSE MONOHYDRATE 1500 MG: 50 INJECTION, SOLUTION INTRAVENOUS at 14:03

## 2018-01-30 ASSESSMENT — PAIN SCALES - GENERAL: PAINLEVEL_OUTOF10: 0

## 2018-01-30 NOTE — CONSULTS
Start Date End Date Taking?  Authorizing Provider   losartan (COZAAR) 100 MG tablet TAKE ONE TABLET BY MOUTH DAILY 12/19/17   Alyssa Maxwell MD   L-Methylfolate-Algae-B12-B6 (METANX PO) Take by mouth daily    Historical Provider, MD   MELATONIN PO Take 10 mg by mouth    Historical Provider, MD   allopurinol (ZYLOPRIM) 100 MG tablet TAKE ONE TABLET BY MOUTH DAILY 7/3/17   Alyssa Maxwell MD   Calcium-Vitamin D (CALTRATE 600 PLUS-VIT D PO) Take 1 tablet by mouth daily    Historical Provider, MD   Multiple Vitamins-Minerals (THERAPEUTIC MULTIVITAMIN-MINERALS) tablet Take 1 tablet by mouth daily    Historical Provider, MD   b complex vitamins capsule Take 1 capsule by mouth daily    Historical Provider, MD   vitamin D (CHOLECALCIFEROL) 1000 UNIT TABS tablet Take 1,000 Units by mouth daily    Historical Provider, MD   Insulin Glargine (TOUJEO SOLOSTAR) 300 UNIT/ML SOPN Inject 45 Units into the skin nightly  6/29/16   Alyssa Maxwell MD   imipramine (TOFRANIL) 50 MG tablet Take 75 mg by mouth nightly  11/30/15   Historical Provider, MD   atorvastatin (LIPITOR) 80 MG tablet TAKE ONE TABLET BY MOUTH EVERY DAY  Patient taking differently: 40 mg TAKE ONE TABLET BY MOUTH EVERY DAY 12/16/15   Alyssa Maxwell MD   INVOKANA 300 MG TABS tablet Take 300 mg by mouth daily  5/29/15   Historical Provider, MD   ONE TOUCH ULTRA TEST strip 1 each daily  3/27/15   Historical Provider, MD   LYRICA 300 MG capsule Take 300 mg by mouth nightly  5/30/15   Historical Provider, MD   metoprolol (TOPROL-XL) 50 MG XL tablet Take 50 mg by mouth 2 times daily  5/21/15   Historical Provider, MD   metFORMIN ER (GLUCOPHAGE-XR) 500 MG XR tablet Take 1,000 mg by mouth 2 times daily  5/21/15   Historical Provider, MD   CLICKFINE PEN NEEDLES 31G X 8 MM MISC 1 each daily  6/6/15   Historical Provider, MD   exenatide (BYETTA 10 MCG PEN) 10 MCG/0.04ML injection Inject 10 mcg into the skin 2 times daily (with meals)     Historical Provider, MD

## 2018-01-31 ENCOUNTER — ANESTHESIA EVENT (OUTPATIENT)
Dept: OPERATING ROOM | Age: 53
DRG: 617 | End: 2018-01-31
Payer: COMMERCIAL

## 2018-01-31 ENCOUNTER — ANESTHESIA (OUTPATIENT)
Dept: OPERATING ROOM | Age: 53
DRG: 617 | End: 2018-01-31
Payer: COMMERCIAL

## 2018-01-31 VITALS — TEMPERATURE: 98.6 F | OXYGEN SATURATION: 95 % | DIASTOLIC BLOOD PRESSURE: 67 MMHG | SYSTOLIC BLOOD PRESSURE: 123 MMHG

## 2018-01-31 LAB
ABSOLUTE EOS #: 0.4 K/UL (ref 0–0.4)
ABSOLUTE IMMATURE GRANULOCYTE: ABNORMAL K/UL (ref 0–0.3)
ABSOLUTE LYMPH #: 2.1 K/UL (ref 1–4.8)
ABSOLUTE MONO #: 0.8 K/UL (ref 0–1)
BASOPHILS # BLD: 0 % (ref 0–2)
BASOPHILS ABSOLUTE: 0 K/UL (ref 0–0.2)
C-REACTIVE PROTEIN: 53.8 MG/L (ref 0–5)
CULTURE: NORMAL
DIFFERENTIAL TYPE: ABNORMAL
DIRECT EXAM: NORMAL
EOSINOPHILS RELATIVE PERCENT: 7 % (ref 0–8)
GLUCOSE BLD-MCNC: 247 MG/DL (ref 74–100)
GLUCOSE BLD-MCNC: 400 MG/DL (ref 74–100)
GLUCOSE BLD-MCNC: 85 MG/DL (ref 74–100)
HCT VFR BLD CALC: 33.5 % (ref 36–46)
HEMOGLOBIN: 11 G/DL (ref 12–16)
IMMATURE GRANULOCYTES: ABNORMAL %
LYMPHOCYTES # BLD: 36 % (ref 24–44)
Lab: NORMAL
Lab: NORMAL
MCH RBC QN AUTO: 30 PG (ref 26–34)
MCHC RBC AUTO-ENTMCNC: 32.9 G/DL (ref 31–37)
MCV RBC AUTO: 91.2 FL (ref 80–100)
MONOCYTES # BLD: 13 % (ref 0–12)
NRBC AUTOMATED: ABNORMAL PER 100 WBC
PDW BLD-RTO: 15.2 % (ref 12.1–15.2)
PLATELET # BLD: 265 K/UL (ref 140–450)
PLATELET ESTIMATE: ABNORMAL
PMV BLD AUTO: 8.2 FL (ref 6–12)
RBC # BLD: 3.68 M/UL (ref 4–5.2)
RBC # BLD: ABNORMAL 10*6/UL
SEG NEUTROPHILS: 44 % (ref 36–66)
SEGMENTED NEUTROPHILS ABSOLUTE COUNT: 2.6 K/UL (ref 1.8–7.7)
SPECIMEN DESCRIPTION: NORMAL
SPECIMEN DESCRIPTION: NORMAL
STATUS: NORMAL
WBC # BLD: 6 K/UL (ref 3.5–11)
WBC # BLD: ABNORMAL 10*3/UL

## 2018-01-31 PROCEDURE — 2580000003 HC RX 258: Performed by: PODIATRIST

## 2018-01-31 PROCEDURE — 2580000003 HC RX 258: Performed by: NURSE ANESTHETIST, CERTIFIED REGISTERED

## 2018-01-31 PROCEDURE — 3E0T3BZ INTRODUCTION OF ANESTHETIC AGENT INTO PERIPHERAL NERVES AND PLEXI, PERCUTANEOUS APPROACH: ICD-10-PCS | Performed by: NURSE ANESTHETIST, CERTIFIED REGISTERED

## 2018-01-31 PROCEDURE — 0Y6P0Z3 DETACHMENT AT RIGHT 1ST TOE, LOW, OPEN APPROACH: ICD-10-PCS | Performed by: PODIATRIST

## 2018-01-31 PROCEDURE — 86403 PARTICLE AGGLUT ANTBDY SCRN: CPT

## 2018-01-31 PROCEDURE — 3700000001 HC ADD 15 MINUTES (ANESTHESIA): Performed by: PODIATRIST

## 2018-01-31 PROCEDURE — 3600000003 HC SURGERY LEVEL 3 BASE: Performed by: PODIATRIST

## 2018-01-31 PROCEDURE — 6360000002 HC RX W HCPCS: Performed by: PODIATRIST

## 2018-01-31 PROCEDURE — 88304 TISSUE EXAM BY PATHOLOGIST: CPT

## 2018-01-31 PROCEDURE — 7100000001 HC PACU RECOVERY - ADDTL 15 MIN: Performed by: PODIATRIST

## 2018-01-31 PROCEDURE — 36415 COLL VENOUS BLD VENIPUNCTURE: CPT

## 2018-01-31 PROCEDURE — 85025 COMPLETE CBC W/AUTO DIFF WBC: CPT

## 2018-01-31 PROCEDURE — 6370000000 HC RX 637 (ALT 250 FOR IP): Performed by: PHYSICIAN ASSISTANT

## 2018-01-31 PROCEDURE — 88305 TISSUE EXAM BY PATHOLOGIST: CPT

## 2018-01-31 PROCEDURE — 7100000000 HC PACU RECOVERY - FIRST 15 MIN: Performed by: PODIATRIST

## 2018-01-31 PROCEDURE — 3700000000 HC ANESTHESIA ATTENDED CARE: Performed by: PODIATRIST

## 2018-01-31 PROCEDURE — 64445 NJX AA&/STRD SCIATIC NRV IMG: CPT | Performed by: NURSE ANESTHETIST, CERTIFIED REGISTERED

## 2018-01-31 PROCEDURE — 86140 C-REACTIVE PROTEIN: CPT

## 2018-01-31 PROCEDURE — 6360000002 HC RX W HCPCS: Performed by: NURSE ANESTHETIST, CERTIFIED REGISTERED

## 2018-01-31 PROCEDURE — 87070 CULTURE OTHR SPECIMN AEROBIC: CPT

## 2018-01-31 PROCEDURE — 82947 ASSAY GLUCOSE BLOOD QUANT: CPT

## 2018-01-31 PROCEDURE — 28124 PARTIAL REMOVAL OF TOE: CPT | Performed by: PODIATRIST

## 2018-01-31 PROCEDURE — 1200000000 HC SEMI PRIVATE

## 2018-01-31 PROCEDURE — 3600000013 HC SURGERY LEVEL 3 ADDTL 15MIN: Performed by: PODIATRIST

## 2018-01-31 PROCEDURE — 87205 SMEAR GRAM STAIN: CPT

## 2018-01-31 PROCEDURE — 87075 CULTR BACTERIA EXCEPT BLOOD: CPT

## 2018-01-31 PROCEDURE — 2720000010 HC SURG SUPPLY STERILE: Performed by: PODIATRIST

## 2018-01-31 PROCEDURE — 88311 DECALCIFY TISSUE: CPT

## 2018-01-31 RX ORDER — ONDANSETRON 2 MG/ML
INJECTION INTRAMUSCULAR; INTRAVENOUS PRN
Status: DISCONTINUED | OUTPATIENT
Start: 2018-01-31 | End: 2018-01-31 | Stop reason: SDUPTHER

## 2018-01-31 RX ORDER — PROPOFOL 10 MG/ML
INJECTION, EMULSION INTRAVENOUS PRN
Status: DISCONTINUED | OUTPATIENT
Start: 2018-01-31 | End: 2018-01-31 | Stop reason: SDUPTHER

## 2018-01-31 RX ORDER — GENTAMICIN SULFATE 40 MG/ML
INJECTION, SOLUTION INTRAMUSCULAR; INTRAVENOUS PRN
Status: DISCONTINUED | OUTPATIENT
Start: 2018-01-31 | End: 2018-01-31 | Stop reason: HOSPADM

## 2018-01-31 RX ORDER — PROPOFOL 10 MG/ML
INJECTION, EMULSION INTRAVENOUS PRN
Status: DISCONTINUED | OUTPATIENT
Start: 2018-01-31 | End: 2018-01-31

## 2018-01-31 RX ORDER — FENTANYL CITRATE 50 UG/ML
INJECTION, SOLUTION INTRAMUSCULAR; INTRAVENOUS PRN
Status: DISCONTINUED | OUTPATIENT
Start: 2018-01-31 | End: 2018-01-31

## 2018-01-31 RX ORDER — FENTANYL CITRATE 50 UG/ML
INJECTION, SOLUTION INTRAMUSCULAR; INTRAVENOUS PRN
Status: DISCONTINUED | OUTPATIENT
Start: 2018-01-31 | End: 2018-01-31 | Stop reason: SDUPTHER

## 2018-01-31 RX ORDER — ROPIVACAINE HYDROCHLORIDE 5 MG/ML
INJECTION, SOLUTION EPIDURAL; INFILTRATION; PERINEURAL PRN
Status: DISCONTINUED | OUTPATIENT
Start: 2018-01-31 | End: 2018-01-31 | Stop reason: SDUPTHER

## 2018-01-31 RX ORDER — METOCLOPRAMIDE HYDROCHLORIDE 5 MG/ML
10 INJECTION INTRAMUSCULAR; INTRAVENOUS
Status: DISCONTINUED | OUTPATIENT
Start: 2018-01-31 | End: 2018-01-31 | Stop reason: HOSPADM

## 2018-01-31 RX ORDER — SODIUM CHLORIDE 0.9 % (FLUSH) 0.9 %
10 SYRINGE (ML) INJECTION PRN
Status: DISCONTINUED | OUTPATIENT
Start: 2018-01-31 | End: 2018-01-31 | Stop reason: HOSPADM

## 2018-01-31 RX ORDER — DEXAMETHASONE SODIUM PHOSPHATE 10 MG/ML
INJECTION INTRAMUSCULAR; INTRAVENOUS PRN
Status: DISCONTINUED | OUTPATIENT
Start: 2018-01-31 | End: 2018-01-31 | Stop reason: SDUPTHER

## 2018-01-31 RX ORDER — PROPOFOL 10 MG/ML
INJECTION, EMULSION INTRAVENOUS CONTINUOUS PRN
Status: DISCONTINUED | OUTPATIENT
Start: 2018-01-31 | End: 2018-01-31 | Stop reason: SDUPTHER

## 2018-01-31 RX ORDER — MIDAZOLAM HYDROCHLORIDE 1 MG/ML
INJECTION INTRAMUSCULAR; INTRAVENOUS PRN
Status: DISCONTINUED | OUTPATIENT
Start: 2018-01-31 | End: 2018-01-31 | Stop reason: SDUPTHER

## 2018-01-31 RX ORDER — FENTANYL CITRATE 50 UG/ML
25 INJECTION, SOLUTION INTRAMUSCULAR; INTRAVENOUS EVERY 5 MIN PRN
Status: DISCONTINUED | OUTPATIENT
Start: 2018-01-31 | End: 2018-01-31 | Stop reason: HOSPADM

## 2018-01-31 RX ORDER — OXYCODONE HYDROCHLORIDE AND ACETAMINOPHEN 5; 325 MG/1; MG/1
1 TABLET ORAL EVERY 4 HOURS PRN
Status: DISCONTINUED | OUTPATIENT
Start: 2018-01-31 | End: 2018-02-02 | Stop reason: HOSPADM

## 2018-01-31 RX ORDER — SODIUM CHLORIDE 0.9 % (FLUSH) 0.9 %
10 SYRINGE (ML) INJECTION EVERY 12 HOURS SCHEDULED
Status: DISCONTINUED | OUTPATIENT
Start: 2018-01-31 | End: 2018-01-31 | Stop reason: HOSPADM

## 2018-01-31 RX ORDER — SODIUM CHLORIDE, SODIUM LACTATE, POTASSIUM CHLORIDE, CALCIUM CHLORIDE 600; 310; 30; 20 MG/100ML; MG/100ML; MG/100ML; MG/100ML
INJECTION, SOLUTION INTRAVENOUS CONTINUOUS PRN
Status: DISCONTINUED | OUTPATIENT
Start: 2018-01-31 | End: 2018-01-31 | Stop reason: SDUPTHER

## 2018-01-31 RX ADMIN — VANCOMYCIN HYDROCHLORIDE 1000 MG: 1 INJECTION, POWDER, LYOPHILIZED, FOR SOLUTION INTRAVENOUS at 03:47

## 2018-01-31 RX ADMIN — PROPOFOL 150 MCG/KG/MIN: 10 INJECTION, EMULSION INTRAVENOUS at 10:18

## 2018-01-31 RX ADMIN — PREGABALIN 300 MG: 75 CAPSULE ORAL at 20:20

## 2018-01-31 RX ADMIN — METOPROLOL SUCCINATE 50 MG: 50 TABLET, FILM COATED, EXTENDED RELEASE ORAL at 20:20

## 2018-01-31 RX ADMIN — ROPIVACAINE HYDROCHLORIDE 20 ML: 5 INJECTION, SOLUTION EPIDURAL; INFILTRATION; PERINEURAL at 10:00

## 2018-01-31 RX ADMIN — ONDANSETRON 4 MG: 2 INJECTION INTRAMUSCULAR; INTRAVENOUS at 10:50

## 2018-01-31 RX ADMIN — DEXTROSE MONOHYDRATE 3.38 G: 50 INJECTION, SOLUTION INTRAVENOUS at 00:15

## 2018-01-31 RX ADMIN — PIPERACILLIN SODIUM,TAZOBACTAM SODIUM 3.38 G: 3; .375 INJECTION, POWDER, FOR SOLUTION INTRAVENOUS at 15:19

## 2018-01-31 RX ADMIN — PROPOFOL 80 MG: 10 INJECTION, EMULSION INTRAVENOUS at 10:16

## 2018-01-31 RX ADMIN — Medication 10 ML: at 07:01

## 2018-01-31 RX ADMIN — DEXAMETHASONE SODIUM PHOSPHATE 10 MG: 10 INJECTION INTRAMUSCULAR; INTRAVENOUS at 10:00

## 2018-01-31 RX ADMIN — LOSARTAN POTASSIUM 100 MG: 50 TABLET ORAL at 13:27

## 2018-01-31 RX ADMIN — SODIUM CHLORIDE, POTASSIUM CHLORIDE, SODIUM LACTATE AND CALCIUM CHLORIDE: 600; 310; 30; 20 INJECTION, SOLUTION INTRAVENOUS at 09:44

## 2018-01-31 RX ADMIN — Medication 3 MG: at 23:47

## 2018-01-31 RX ADMIN — IMIPRAMINE HYDROCHLORIDE 75 MG: 25 TABLET ORAL at 20:20

## 2018-01-31 RX ADMIN — INSULIN LISPRO 4 UNITS: 100 INJECTION, SOLUTION INTRAVENOUS; SUBCUTANEOUS at 16:22

## 2018-01-31 RX ADMIN — Medication 10 ML: at 20:20

## 2018-01-31 RX ADMIN — ATORVASTATIN CALCIUM 80 MG: 40 TABLET, FILM COATED ORAL at 20:20

## 2018-01-31 RX ADMIN — MULTIPLE VITAMINS W/ MINERALS TAB 1 TABLET: TAB at 13:27

## 2018-01-31 RX ADMIN — FENTANYL CITRATE 25 MCG: 50 INJECTION INTRAMUSCULAR; INTRAVENOUS at 10:48

## 2018-01-31 RX ADMIN — DEXTROSE MONOHYDRATE 3.38 G: 50 INJECTION, SOLUTION INTRAVENOUS at 06:59

## 2018-01-31 RX ADMIN — ENOXAPARIN SODIUM 30 MG: 30 INJECTION SUBCUTANEOUS at 20:19

## 2018-01-31 RX ADMIN — PIPERACILLIN SODIUM,TAZOBACTAM SODIUM 3.38 G: 3; .375 INJECTION, POWDER, FOR SOLUTION INTRAVENOUS at 22:44

## 2018-01-31 RX ADMIN — FENTANYL CITRATE 25 MCG: 50 INJECTION INTRAMUSCULAR; INTRAVENOUS at 11:16

## 2018-01-31 RX ADMIN — INSULIN GLARGINE 20 UNITS: 100 INJECTION, SOLUTION SUBCUTANEOUS at 20:23

## 2018-01-31 RX ADMIN — ALLOPURINOL 100 MG: 100 TABLET ORAL at 13:27

## 2018-01-31 RX ADMIN — INSULIN LISPRO 5 UNITS: 100 INJECTION, SOLUTION INTRAVENOUS; SUBCUTANEOUS at 20:23

## 2018-01-31 RX ADMIN — PROPOFOL 70 MCG/KG/MIN: 10 INJECTION, EMULSION INTRAVENOUS at 10:52

## 2018-01-31 RX ADMIN — FENTANYL CITRATE 50 MCG: 50 INJECTION INTRAMUSCULAR; INTRAVENOUS at 10:08

## 2018-01-31 RX ADMIN — MIDAZOLAM HYDROCHLORIDE 2 MG: 1 INJECTION, SOLUTION INTRAMUSCULAR; INTRAVENOUS at 09:54

## 2018-01-31 RX ADMIN — VANCOMYCIN HYDROCHLORIDE 1000 MG: 1 INJECTION, POWDER, LYOPHILIZED, FOR SOLUTION INTRAVENOUS at 16:21

## 2018-01-31 ASSESSMENT — PULMONARY FUNCTION TESTS
PIF_VALUE: 1
PIF_VALUE: 1
PIF_VALUE: -12
PIF_VALUE: 1
PIF_VALUE: -12
PIF_VALUE: 1
PIF_VALUE: -12
PIF_VALUE: 1
PIF_VALUE: -12
PIF_VALUE: 2
PIF_VALUE: 1
PIF_VALUE: -12
PIF_VALUE: 1
PIF_VALUE: 0
PIF_VALUE: 1

## 2018-01-31 ASSESSMENT — PAIN SCALES - GENERAL
PAINLEVEL_OUTOF10: 0

## 2018-01-31 ASSESSMENT — PAIN - FUNCTIONAL ASSESSMENT: PAIN_FUNCTIONAL_ASSESSMENT: 0-10

## 2018-01-31 NOTE — PLAN OF CARE
Problem: Nutrition  Goal: Optimal nutrition therapy  Outcome: Ongoing  Nutrition Problem: Increased nutrient needs  Intervention: Food and/or Nutrient Delivery: Start oral diet, Start ONS (post op)  Nutritional Goals: PO>75% meals and dameon  dameon post op bid.

## 2018-01-31 NOTE — PROGRESS NOTES
Nutrition Assessment    Type and Reason for Visit: Initial (no nutrition screening)    Nutrition Recommendations: Add James post op. Malnutrition Assessment:  · Malnutrition Status: No malnutrition  · Context: Acute illness or injury  · Findings of the 6 clinical characteristics of malnutrition (Minimum of 2 out of 6 clinical characteristics is required to make the diagnosis of moderate or severe Protein Calorie Malnutrition based on AND/ASPEN Guidelines):  1. Energy Intake-Greater than 75%,      2. Weight Loss-No significant weight loss,    3. Fat Loss-No significant subcutaneous fat loss,    4. Muscle Loss-No significant muscle mass loss,    5. Fluid Accumulation-No significant fluid accumulation,    6.  Strength-Not measured    Nutrition Diagnosis:   · Problem: Increased nutrient needs  · Etiology: related to Acute injury/trauma     Signs and symptoms:  as evidenced by Presence of wounds    Nutrition Assessment:  · Subjective Assessment: Reports last A1C 6.1. Recently taken off of one hypoglycemic.    · Nutrition-Focused Physical Findings:    · Wound Type:  (pending surgery for toe)  · Current Nutrition Therapies:  · Oral Diet Orders: NPO   · Oral Diet intake: NPO  · Oral Nutrition Supplement (ONS) Orders: None  · Anthropometric Measures:  · Ht: 5' 8\" (172.7 cm)   · Current Body Wt: 262 lb 12.8 oz (119.2 kg)  · Admission Body Wt: 262 lb 12.8 oz (119.2 kg)  · Usual Body Wt: 262 lb (118.8 kg)  · % Weight Change: 0,     · Ideal Body Wt: 140 lb (63.5 kg), % Ideal Body 188%  · Adjusted Body Wt: 171 lb 1.2 oz (77.6 kg), body weight adjusted for Obesity  · BMI Classification: BMI > or equal to 40.0 Obese Class III  · Comparative Standards (Estimated Nutrition Needs):  · Estimated Daily Total Kcal: 2506-7133  · Estimated Daily Protein (g):     Lab Results   Component Value Date     01/30/2018    K 4.7 01/30/2018    CL 98 01/30/2018    CO2 27 01/30/2018    BUN 37 (H) 01/30/2018    CREATININE 1.33 (H) 01/30/2018    GLUCOSE 100 (H) 01/30/2018    CALCIUM 9.8 01/30/2018    PROT 7.2 01/30/2018    LABALBU 3.7 01/30/2018    BILITOT 0.45 01/30/2018    ALKPHOS 132 (H) 01/30/2018    AST 16 01/30/2018    ALT 18 01/30/2018    LABGLOM 42 (L) 01/30/2018    GFRAA 51 (L) 01/30/2018     Lab Results   Component Value Date    LABA1C 6.5 (H) 07/26/2016     No results found for: EAG    Estimated Intake vs Estimated Needs: Intake Less Than Needs    Nutrition Risk Level: Moderate, High    Well controlled diabetes per reported A1C, which has been consistently good, per patient. On multiple agents pta, with one d/c'd by endo recently. Understands nutrition and wound healing needs. Discussed use of dameon in post op state to aid healing process. Renal indices up a little from last known data.       Nutrition Interventions:   Start oral diet, Start ONS (post op)  Continued Inpatient Monitoring, Coordination of Care, Education declined    Nutrition Evaluation:   · Evaluation: Goals set   · Goals: PO>75% meals and dameon    · Monitoring: Supplement Intake, Meal Intake, Pertinent Labs, Weight, Wound Healing    Electronically signed by Christopher De La Cruz RD, LD on 1/31/18 at 8:15 AM    Contact Number: [phone]

## 2018-01-31 NOTE — ANESTHESIA PROCEDURE NOTES
Peripheral Block    Patient location during procedure: pre-op  Start time: 1/31/2018 9:54 AM  End time: 1/31/2018 10:00 AM  Staffing  Resident/CRNA: Alan VAZQUEZ  Performed: resident/CRNA   Preanesthetic Checklist  Completed: patient identified, site marked, surgical consent, pre-op evaluation, timeout performed, IV checked, risks and benefits discussed, monitors and equipment checked, anesthesia consent given, oxygen available and patient being monitored  Peripheral Block  Patient position: left lateral decubitus  Prep: ChloraPrep  Patient monitoring: continuous pulse ox, frequent blood pressure checks and IV access  Block type: Sciatic  Laterality: right  Injection technique: single-shot  Procedures: ultrasound guided and nerve stimulator  Local infiltration: ropivacaine and decadron (+10 mg dexamethasone)  Infiltration strength: 0.5 %  Dose: 20 mL  Popliteal  Provider prep: mask and sterile gloves  Local infiltration: ropivacaine and decadron (+10 mg dexamethasone)  Needle  Needle type: Other   Needle gauge: 22 G  Needle length: 5 cm  Needle localization: nerve stimulator and ultrasound guidance (nerve stim at 0.4)  Needle insertion depth: 5 cmOther needle type: Pajunk sonostim  Assessment  Injection assessment: negative aspiration for heme, no paresthesia on injection and local visualized surrounding nerve on ultrasound  Paresthesia pain: none  Slow fractionated injection: yes  Hemodynamics: stable  Additional Notes  0954 time out, then sedation given and prep, nerve stim obtained at 0.4 with twitch of calf and foot  1000 LA injected, pt manuel well.   Reason for block: post-op pain management and at surgeon's request

## 2018-01-31 NOTE — BRIEF OP NOTE
Brief Postoperative Note    Yoli Hannah  YOB: 1965  [id number]    Pre-operative Diagnosis: erosive / unstable osteomyelitis , right hallux    Post-operative Diagnosis: Same    Procedure: partial amputation right hallux , open technique     Anesthesia: Nerve Block ; RLE popliteal    Surgeons/Assistants: Dr. Larissa Narvaez    Estimated Blood Loss: less than 50     Complications: None    Specimens: Was Obtained: bone x 2                                                Deep culture ; aerobic / anaerobic / acid fast     Findings: organized abscess right plantar hallux tuft                  Associated sub-Q ischemia plantar tuft                   Apparent \"clean\" margin @ anatomic joint : IPJ of hallux - right     Electronically signed by Nas Pleitez DPM on 1/31/2018 at 11:50 AM

## 2018-01-31 NOTE — PLAN OF CARE
Problem: SAFETY  Goal: Free from accidental physical injury  Outcome: Ongoing  Call light in reach. Fall risk assessed daily. Pt able to call for assistance. Non slip socks in place. Bed in lowest position with wheels locked. ID band is on and visible. Will continue to assess and monitor. Problem: DAILY CARE  Goal: Daily care needs are met  Outcome: Ongoing  Daily cares are being completed by patient. Assistance given as needed. Pt able to ask for help. Fifty [place] encouraged. Problem: SKIN INTEGRITY  Goal: Skin integrity is maintained or improved  Outcome: Ongoing  Continuing to assess and monitor.

## 2018-02-01 PROBLEM — N18.9 CKD (CHRONIC KIDNEY DISEASE): Chronic | Status: ACTIVE | Noted: 2018-02-01

## 2018-02-01 PROBLEM — R09.02 HYPOXIA: Status: ACTIVE | Noted: 2018-02-01

## 2018-02-01 LAB
ANION GAP SERPL CALCULATED.3IONS-SCNC: 13 MMOL/L (ref 9–17)
BUN BLDV-MCNC: 36 MG/DL (ref 6–20)
BUN/CREAT BLD: 26 (ref 9–20)
CALCIUM SERPL-MCNC: 8.9 MG/DL (ref 8.6–10.4)
CHLORIDE BLD-SCNC: 99 MMOL/L (ref 98–107)
CO2: 27 MMOL/L (ref 20–31)
CREAT SERPL-MCNC: 1.41 MG/DL (ref 0.5–0.9)
GFR AFRICAN AMERICAN: 47 ML/MIN
GFR NON-AFRICAN AMERICAN: 39 ML/MIN
GFR SERPL CREATININE-BSD FRML MDRD: ABNORMAL ML/MIN/{1.73_M2}
GFR SERPL CREATININE-BSD FRML MDRD: ABNORMAL ML/MIN/{1.73_M2}
GLUCOSE BLD-MCNC: 171 MG/DL (ref 74–100)
GLUCOSE BLD-MCNC: 198 MG/DL (ref 74–100)
GLUCOSE BLD-MCNC: 215 MG/DL (ref 70–99)
GLUCOSE BLD-MCNC: 241 MG/DL (ref 74–100)
GLUCOSE BLD-MCNC: 252 MG/DL (ref 74–100)
GLUCOSE BLD-MCNC: 86 MG/DL (ref 74–100)
HCT VFR BLD CALC: 33.7 % (ref 36–46)
HEMOGLOBIN: 11.3 G/DL (ref 12–16)
MCH RBC QN AUTO: 31.1 PG (ref 26–34)
MCHC RBC AUTO-ENTMCNC: 33.3 G/DL (ref 31–37)
MCV RBC AUTO: 93.2 FL (ref 80–100)
NRBC AUTOMATED: ABNORMAL PER 100 WBC
PDW BLD-RTO: 14.9 % (ref 12.1–15.2)
PLATELET # BLD: 309 K/UL (ref 140–450)
PMV BLD AUTO: 8.7 FL (ref 6–12)
POTASSIUM SERPL-SCNC: 4.5 MMOL/L (ref 3.7–5.3)
RBC # BLD: 3.62 M/UL (ref 4–5.2)
SODIUM BLD-SCNC: 139 MMOL/L (ref 135–144)
VANCOMYCIN TROUGH DATE LAST DOSE: NORMAL
VANCOMYCIN TROUGH DOSE AMOUNT: NORMAL
VANCOMYCIN TROUGH TIME LAST DOSE: NORMAL
VANCOMYCIN TROUGH: 12.3 UG/ML (ref 10–20)
WBC # BLD: 9 K/UL (ref 3.5–11)

## 2018-02-01 PROCEDURE — 1200000000 HC SEMI PRIVATE

## 2018-02-01 PROCEDURE — 82947 ASSAY GLUCOSE BLOOD QUANT: CPT

## 2018-02-01 PROCEDURE — 80202 ASSAY OF VANCOMYCIN: CPT

## 2018-02-01 PROCEDURE — 85027 COMPLETE CBC AUTOMATED: CPT

## 2018-02-01 PROCEDURE — 90732 PPSV23 VACC 2 YRS+ SUBQ/IM: CPT | Performed by: PODIATRIST

## 2018-02-01 PROCEDURE — 36415 COLL VENOUS BLD VENIPUNCTURE: CPT

## 2018-02-01 PROCEDURE — G0009 ADMIN PNEUMOCOCCAL VACCINE: HCPCS | Performed by: PODIATRIST

## 2018-02-01 PROCEDURE — 6370000000 HC RX 637 (ALT 250 FOR IP): Performed by: PHYSICIAN ASSISTANT

## 2018-02-01 PROCEDURE — 6360000002 HC RX W HCPCS: Performed by: PODIATRIST

## 2018-02-01 PROCEDURE — 80048 BASIC METABOLIC PNL TOTAL CA: CPT

## 2018-02-01 PROCEDURE — 99024 POSTOP FOLLOW-UP VISIT: CPT | Performed by: PODIATRIST

## 2018-02-01 PROCEDURE — 2580000003 HC RX 258: Performed by: PODIATRIST

## 2018-02-01 RX ADMIN — WATER 500 MG: 100 IRRIGANT IRRIGATION at 16:00

## 2018-02-01 RX ADMIN — PIPERACILLIN SODIUM,TAZOBACTAM SODIUM 3.38 G: 3; .375 INJECTION, POWDER, FOR SOLUTION INTRAVENOUS at 22:36

## 2018-02-01 RX ADMIN — PNEUMOCOCCAL VACCINE POLYVALENT 0.5 ML
25; 25; 25; 25; 25; 25; 25; 25; 25; 25; 25; 25; 25; 25; 25; 25; 25; 25; 25; 25; 25; 25; 25 INJECTION, SOLUTION INTRAMUSCULAR; SUBCUTANEOUS at 12:45

## 2018-02-01 RX ADMIN — Medication 10 ML: at 22:04

## 2018-02-01 RX ADMIN — PIPERACILLIN SODIUM,TAZOBACTAM SODIUM 3.38 G: 3; .375 INJECTION, POWDER, FOR SOLUTION INTRAVENOUS at 06:56

## 2018-02-01 RX ADMIN — VANCOMYCIN HYDROCHLORIDE 1000 MG: 1 INJECTION, POWDER, LYOPHILIZED, FOR SOLUTION INTRAVENOUS at 04:03

## 2018-02-01 RX ADMIN — INSULIN LISPRO 2 UNITS: 100 INJECTION, SOLUTION INTRAVENOUS; SUBCUTANEOUS at 07:37

## 2018-02-01 RX ADMIN — METOPROLOL SUCCINATE 50 MG: 50 TABLET, FILM COATED, EXTENDED RELEASE ORAL at 20:53

## 2018-02-01 RX ADMIN — PREGABALIN 300 MG: 75 CAPSULE ORAL at 20:52

## 2018-02-01 RX ADMIN — METOPROLOL SUCCINATE 50 MG: 50 TABLET, FILM COATED, EXTENDED RELEASE ORAL at 08:39

## 2018-02-01 RX ADMIN — ALLOPURINOL 100 MG: 100 TABLET ORAL at 08:39

## 2018-02-01 RX ADMIN — MULTIPLE VITAMINS W/ MINERALS TAB 1 TABLET: TAB at 08:39

## 2018-02-01 RX ADMIN — IMIPRAMINE HYDROCHLORIDE 75 MG: 25 TABLET ORAL at 20:53

## 2018-02-01 RX ADMIN — ATORVASTATIN CALCIUM 80 MG: 40 TABLET, FILM COATED ORAL at 20:53

## 2018-02-01 RX ADMIN — VANCOMYCIN HYDROCHLORIDE 1000 MG: 1 INJECTION, POWDER, LYOPHILIZED, FOR SOLUTION INTRAVENOUS at 17:02

## 2018-02-01 RX ADMIN — ENOXAPARIN SODIUM 30 MG: 30 INJECTION SUBCUTANEOUS at 20:53

## 2018-02-01 RX ADMIN — INSULIN LISPRO 6 UNITS: 100 INJECTION, SOLUTION INTRAVENOUS; SUBCUTANEOUS at 11:56

## 2018-02-01 RX ADMIN — LOSARTAN POTASSIUM 100 MG: 50 TABLET ORAL at 08:40

## 2018-02-01 RX ADMIN — ENOXAPARIN SODIUM 30 MG: 30 INJECTION SUBCUTANEOUS at 08:39

## 2018-02-01 RX ADMIN — INSULIN LISPRO 6 UNITS: 100 INJECTION, SOLUTION INTRAVENOUS; SUBCUTANEOUS at 16:47

## 2018-02-01 RX ADMIN — PIPERACILLIN SODIUM,TAZOBACTAM SODIUM 3.38 G: 3; .375 INJECTION, POWDER, FOR SOLUTION INTRAVENOUS at 15:08

## 2018-02-01 NOTE — OP NOTE
would be done at staged layers with associated closure  techniques. The aforementioned long flexor and extensor tendons were  isolated. They would be debrided of their distal end about each 1/2 to  2/3rd cm and they would be tenodesed together using 2-0 Vicryl. At this  time, the wound bed was closed loosely with large retention sutures of 2-0  nylon, which were bolstered using cut sections of the red rubber catheter  #10. This was basically a vertical mattress technique and did not extend  to the incisional end. A small opening was noted and extravasation of  0.25-inch ribbon gauze was done as it was loosely packed in that wound bed. The incision was covered with dry sterile fluff compressive dressing. The patient tolerated the anesthesia and the procedure well and without  complication and subsequently would be transported to the recovery room  with the vital signs stable, afebrile, and in apparent satisfactory  condition. The instrument, sponge, and needle counts were correct.         Samantha Silvestre    D: 01/31/2018 16:05:26       T: 01/31/2018 22:54:42     [email or web address]  Job#: [id number]     Doc#: [id number]    CC:

## 2018-02-01 NOTE — PROGRESS NOTES
POD # 1           POD # 1   N: \" no pain today\"  HPI: open hallux amputation right hallux   ROS: -constitutional   MRR:   Past Medical History:   Diagnosis Date    Endometriosis     Headache(784.0)     Hyperlipidemia     Hypertension     Obesity     Restless legs syndrome     Type 2 diabetes mellitus ([facility] [address]      Past Surgical History:   Procedure Laterality Date    APPENDECTOMY  1989    BUNIONECTOMY Left 08/26/2016   [facility]; 1990    COLONOSCOPY  11/13/2017    Dr. Sugar Mcleod prep-repeat 3-5 years with 2 day prep)   [address]    FOOT SURGERY Left 10/11/2016    Resection infected non-union Lt.  Great toe; insertion antibx bone cement plug    FOOT SURGERY Left 12/21/2016    total arthroplasty with implant big toe    HYSTERECTOMY, TOTAL ABDOMINAL  1991    KNEE ARTHROSCOPY Right 2017    NH COLON CA SCRN NOT HI RSK IND N/A 11/13/2017    COLONOSCOPY performed by Anju Pisano MD at [address] [facility] Right 1/31/2018    TOE AMPUTATION performed by Karlos Rojas DPM at [address] [place] [address] Right 01/31/2018    Dr Tania Orourke- Right big toe   [address] [facility] [address]  2011     No Known Allergies    Current Facility-Administered Medications:     insulin lispro (HUMALOG) injection pen 0-18 Units, 0-18 Units, Subcutaneous, TID WC, Quinten Arguello PA-C    insulin lispro (HUMALOG) injection pen 0-9 Units, 0-9 Units, Subcutaneous, Nightly, Quinten Arguello PA-C    insulin glargine (LANTUS) injection pen 30 Units, 30 Units, Subcutaneous, Nightly, Quinten Arguello PA-C    vancomycin (VANCOCIN) 500 mg in sterile water for irrigation 500 mL irrigation, 500 mg, Irrigation, Once, James Jensen DPM    piperacillin-tazobactam (ZOSYN) 3.375 g in dextrose 5 % 50 mL IVPB extended infusion (mini-bag), 3.375 g, Intravenous, Q8H, Karlos Rojas DPM, Last Rate: 12.5 mL/hr at 02/01/18 1508, 3.375 g at 02/01/18 1508    enoxaparin

## 2018-02-01 NOTE — PROGRESS NOTES
[address] [place] [address]    [id number]  [phone]    Special Requests 01/30/2018 10:55 AM [facility]   RT GREAT TOE Performed at [facility]    Special Requests 01/30/2018 10:55 AM [address]   , [address] [facility]  [phone]    Direct Exam  (Abnormal) 01/30/2018 10:55 AM [facility]     Direct Exam  (Abnormal) 01/30/2018 10:55 AM [facility]     Culture  (Abnormal) 01/30/2018 10:55 AM [place]     Culture  (Abnormal) 01/30/2018 10:55  [address]   STREPTOCOCCI, BETA HEMOLYTIC GROUP B HEAVY GROWTH     Culture 01/30/2018 10:55  [address]   Performed at [facility] [address], [address] [place] [address] [phone]    Status 01/30/2018 10:55  [address]   Pending    Testing Performed By     [facility] Jasvir Starr Name Director Address Valid Date Range   883-- [phone] [facility] LAB Sarah Carrion MD [address] [place] [address].  [facility] [address] 08/30/17 1202-Present   208-Alley Roa MD [address] [facility] [address] 08/30/17 1201-Present   Lab and Collection     Anaerobic and Aerobic Culture on 1/30/2018       OBJECTIVE:    Vitals:   Temp: 97.4 °F (36.3 °C)  BP: (!) 107/53  Resp: 16  Pulse: 70  SpO2: 97 %  24HR INTAKE/OUTPUT:      Intake/Output Summary (Last 24 hours) at 02/01/18 1505  Last data filed at 02/01/18 0900   Gross per 24 hour   Intake             1543 ml   Output                0 ml   Net             1543 ml       -----------------------------------------------------------------  Review of Systems:  Constitutional:negative  for fevers, and negative for chills.   Eyes: negative for visual disturbance   ENT: negative for sore throat, negative nasal congestion, and

## 2018-02-01 NOTE — PROGRESS NOTES
Unsuccessful attempts x3 to place new IV. Supervisor called to attempt, states she will be up shortly.

## 2018-02-02 VITALS
TEMPERATURE: 97.9 F | RESPIRATION RATE: 16 BRPM | WEIGHT: 261 LBS | HEIGHT: 68 IN | OXYGEN SATURATION: 96 % | HEART RATE: 74 BPM | BODY MASS INDEX: 39.56 KG/M2 | DIASTOLIC BLOOD PRESSURE: 71 MMHG | SYSTOLIC BLOOD PRESSURE: 146 MMHG

## 2018-02-02 PROBLEM — A49.02 INFECTION WITH METHICILLIN-RESISTANT STAPHYLOCOCCUS AUREUS (MRSA): Status: ACTIVE | Noted: 2018-02-02

## 2018-02-02 PROBLEM — S91.101A OPEN WOUND OF RIGHT GREAT TOE: Status: ACTIVE | Noted: 2018-02-02

## 2018-02-02 LAB
ANION GAP SERPL CALCULATED.3IONS-SCNC: 13 MMOL/L (ref 9–17)
BUN BLDV-MCNC: 48 MG/DL (ref 6–20)
BUN/CREAT BLD: 31 (ref 9–20)
CALCIUM SERPL-MCNC: 8.8 MG/DL (ref 8.6–10.4)
CHLORIDE BLD-SCNC: 98 MMOL/L (ref 98–107)
CO2: 28 MMOL/L (ref 20–31)
CREAT SERPL-MCNC: 1.54 MG/DL (ref 0.5–0.9)
GFR AFRICAN AMERICAN: 43 ML/MIN
GFR NON-AFRICAN AMERICAN: 35 ML/MIN
GFR SERPL CREATININE-BSD FRML MDRD: ABNORMAL ML/MIN/{1.73_M2}
GFR SERPL CREATININE-BSD FRML MDRD: ABNORMAL ML/MIN/{1.73_M2}
GLUCOSE BLD-MCNC: 129 MG/DL (ref 74–100)
GLUCOSE BLD-MCNC: 164 MG/DL (ref 70–99)
GLUCOSE BLD-MCNC: 167 MG/DL (ref 74–100)
GLUCOSE BLD-MCNC: 246 MG/DL (ref 74–100)
HCT VFR BLD CALC: 33.1 % (ref 36–46)
HEMOGLOBIN: 10.8 G/DL (ref 12–16)
MCH RBC QN AUTO: 30.1 PG (ref 26–34)
MCHC RBC AUTO-ENTMCNC: 32.7 G/DL (ref 31–37)
MCV RBC AUTO: 91.8 FL (ref 80–100)
NRBC AUTOMATED: ABNORMAL PER 100 WBC
PDW BLD-RTO: 15.6 % (ref 12.1–15.2)
PLATELET # BLD: 301 K/UL (ref 140–450)
PMV BLD AUTO: 8.9 FL (ref 6–12)
POTASSIUM SERPL-SCNC: 4.5 MMOL/L (ref 3.7–5.3)
RBC # BLD: 3.6 M/UL (ref 4–5.2)
SODIUM BLD-SCNC: 139 MMOL/L (ref 135–144)
SURGICAL PATHOLOGY REPORT: NORMAL
WBC # BLD: 11.3 K/UL (ref 3.5–11)

## 2018-02-02 PROCEDURE — 3600000002 HC SURGERY LEVEL 2 BASE: Performed by: PODIATRIST

## 2018-02-02 PROCEDURE — 2580000003 HC RX 258: Performed by: PODIATRIST

## 2018-02-02 PROCEDURE — 6370000000 HC RX 637 (ALT 250 FOR IP): Performed by: PHYSICIAN ASSISTANT

## 2018-02-02 PROCEDURE — 6360000002 HC RX W HCPCS: Performed by: PODIATRIST

## 2018-02-02 PROCEDURE — 80048 BASIC METABOLIC PNL TOTAL CA: CPT

## 2018-02-02 PROCEDURE — 3600000012 HC SURGERY LEVEL 2 ADDTL 15MIN: Performed by: PODIATRIST

## 2018-02-02 PROCEDURE — 13160 SEC CLSR SURG WND/DEHSN XTN: CPT | Performed by: PODIATRIST

## 2018-02-02 PROCEDURE — 6370000000 HC RX 637 (ALT 250 FOR IP): Performed by: PODIATRIST

## 2018-02-02 PROCEDURE — 0HXMXZZ TRANSFER RIGHT FOOT SKIN, EXTERNAL APPROACH: ICD-10-PCS | Performed by: PODIATRIST

## 2018-02-02 PROCEDURE — 82947 ASSAY GLUCOSE BLOOD QUANT: CPT

## 2018-02-02 PROCEDURE — 85027 COMPLETE CBC AUTOMATED: CPT

## 2018-02-02 PROCEDURE — 36415 COLL VENOUS BLD VENIPUNCTURE: CPT

## 2018-02-02 PROCEDURE — 0JBR0ZZ EXCISION OF LEFT FOOT SUBCUTANEOUS TISSUE AND FASCIA, OPEN APPROACH: ICD-10-PCS | Performed by: PODIATRIST

## 2018-02-02 PROCEDURE — 2500000003 HC RX 250 WO HCPCS

## 2018-02-02 RX ORDER — SULFAMETHOXAZOLE AND TRIMETHOPRIM 800; 160 MG/1; MG/1
1 TABLET ORAL 2 TIMES DAILY
Qty: 20 TABLET | Refills: 0 | Status: SHIPPED | OUTPATIENT
Start: 2018-02-02 | End: 2018-02-06 | Stop reason: ALTCHOICE

## 2018-02-02 RX ORDER — SODIUM CHLORIDE, SODIUM LACTATE, POTASSIUM CHLORIDE, CALCIUM CHLORIDE 600; 310; 30; 20 MG/100ML; MG/100ML; MG/100ML; MG/100ML
INJECTION, SOLUTION INTRAVENOUS CONTINUOUS
Status: DISCONTINUED | OUTPATIENT
Start: 2018-02-02 | End: 2018-02-02

## 2018-02-02 RX ORDER — SODIUM CHLORIDE 0.9 % (FLUSH) 0.9 %
10 SYRINGE (ML) INJECTION EVERY 12 HOURS SCHEDULED
Status: DISCONTINUED | OUTPATIENT
Start: 2018-02-02 | End: 2018-02-02

## 2018-02-02 RX ORDER — SODIUM CHLORIDE 0.9 % (FLUSH) 0.9 %
10 SYRINGE (ML) INJECTION PRN
Status: DISCONTINUED | OUTPATIENT
Start: 2018-02-02 | End: 2018-02-02

## 2018-02-02 RX ORDER — BUPIVACAINE HYDROCHLORIDE AND EPINEPHRINE 2.5; 5 MG/ML; UG/ML
INJECTION, SOLUTION INFILTRATION; PERINEURAL PRN
Status: DISCONTINUED | OUTPATIENT
Start: 2018-02-02 | End: 2018-02-02 | Stop reason: HOSPADM

## 2018-02-02 RX ADMIN — PIPERACILLIN SODIUM,TAZOBACTAM SODIUM 3.38 G: 3; .375 INJECTION, POWDER, FOR SOLUTION INTRAVENOUS at 15:45

## 2018-02-02 RX ADMIN — METOPROLOL SUCCINATE 50 MG: 50 TABLET, FILM COATED, EXTENDED RELEASE ORAL at 09:38

## 2018-02-02 RX ADMIN — ENOXAPARIN SODIUM 30 MG: 30 INJECTION SUBCUTANEOUS at 10:05

## 2018-02-02 RX ADMIN — INSULIN LISPRO 3 UNITS: 100 INJECTION, SOLUTION INTRAVENOUS; SUBCUTANEOUS at 17:23

## 2018-02-02 RX ADMIN — MULTIPLE VITAMINS W/ MINERALS TAB 1 TABLET: TAB at 10:05

## 2018-02-02 RX ADMIN — ACETAMINOPHEN 650 MG: 325 TABLET ORAL at 13:21

## 2018-02-02 RX ADMIN — INSULIN LISPRO 6 UNITS: 100 INJECTION, SOLUTION INTRAVENOUS; SUBCUTANEOUS at 12:13

## 2018-02-02 RX ADMIN — ACETAMINOPHEN 650 MG: 325 TABLET ORAL at 03:20

## 2018-02-02 RX ADMIN — ALLOPURINOL 100 MG: 100 TABLET ORAL at 10:04

## 2018-02-02 RX ADMIN — VANCOMYCIN HYDROCHLORIDE 1000 MG: 1 INJECTION, POWDER, LYOPHILIZED, FOR SOLUTION INTRAVENOUS at 03:57

## 2018-02-02 RX ADMIN — Medication 10 ML: at 10:05

## 2018-02-02 RX ADMIN — PIPERACILLIN SODIUM,TAZOBACTAM SODIUM 3.38 G: 3; .375 INJECTION, POWDER, FOR SOLUTION INTRAVENOUS at 07:46

## 2018-02-02 RX ADMIN — LOSARTAN POTASSIUM 100 MG: 50 TABLET ORAL at 10:04

## 2018-02-02 ASSESSMENT — PAIN DESCRIPTION - PAIN TYPE: TYPE: ACUTE PAIN

## 2018-02-02 ASSESSMENT — PAIN DESCRIPTION - LOCATION: LOCATION: FOOT

## 2018-02-02 ASSESSMENT — PAIN SCALES - GENERAL
PAINLEVEL_OUTOF10: 3
PAINLEVEL_OUTOF10: 0
PAINLEVEL_OUTOF10: 0
PAINLEVEL_OUTOF10: 2

## 2018-02-02 ASSESSMENT — PAIN - FUNCTIONAL ASSESSMENT: PAIN_FUNCTIONAL_ASSESSMENT: 0-10

## 2018-02-02 ASSESSMENT — PAIN DESCRIPTION - DESCRIPTORS: DESCRIPTORS: ACHING

## 2018-02-02 ASSESSMENT — PAIN DESCRIPTION - ORIENTATION: ORIENTATION: RIGHT

## 2018-02-02 NOTE — PLAN OF CARE
Problem: SAFETY  Goal: Free from accidental physical injury  Outcome: Ongoing  Knows to call for assist when wanting to get up. Also ambulates well with walker with stand by assist. Is aware of her own limitations. Problem: PAIN  Goal: Patient's pain/discomfort is manageable  Outcome: Ongoing  Denies any pain. Problem: SKIN INTEGRITY  Goal: Skin integrity is maintained or improved  Outcome: Ongoing  Has a surgical wound distal area of 1st digit right foot.

## 2018-02-02 NOTE — PROGRESS NOTES
Hospitalist Consult Note    SUBJECTIVE/INTERVAL HISTORY:    Patient seen in follow up for osteo/abscess right great toe, DM, UTI, RLS. POD#2  partial amputation right hallux, open technique. Just returned from: \"Delayed primary closure of foot / toe wound ; A -to-T flap creation /closure -- Right hallux @ FT+ excisional sharp debridement 3rd toe left\". Denies fever chills SOB chest pain. Denies any pain. Off O2. Component Results     Component Collected Lab   Specimen Description 01/31/2018 10:35 AM [facility]   . TOE Performed at [facility] [address]    Specimen Description 01/31/2018 10:35 AM [address]    [id number] [phone]    Special Requests 01/31/2018 10:35 AM [facility]    Direct Exam  (Abnormal) 01/31/2018 10:35 AM [facility]     Direct Exam  (Abnormal) 01/31/2018 10:35 AM [address] COCCI IN PAIRS     Direct Exam 01/31/2018 10:35 AM [facility]   Performed at [facility] [address] [facility], [address] [place] [address] [phone]    Culture 01/31/2018 10:35 AM [facility]   Pending    Status 01/31/2018 10:35 AM [facility]   Pending    Testing Performed By     Chelsea Starr Name Director Address Valid Date Range   [id number]- [id number] [facility] LAB Donny Corea MD [address] [place] [address].  [facility] [address] 08/30/17 1202-Present   [facility] [address] [place] [address] 08/30/17 1201-Present   Lab and Collection     Anaerobic and Aerobic Culture on 1/31/2018   Result History     Anaerobic and Aerobic Culture on 1/31/2018          Result Information     Flag: Abnormal Status: Preliminary result (Collected: 1/31/2018 10:35) Provider      Component Results     Component Collected Lab   Specimen Description

## 2018-02-04 LAB
CULTURE: ABNORMAL
CULTURE: NORMAL
DIRECT EXAM: ABNORMAL
DIRECT EXAM: ABNORMAL
Lab: ABNORMAL
Lab: ABNORMAL
Lab: NORMAL
Lab: NORMAL
ORGANISM: ABNORMAL
SPECIMEN DESCRIPTION: ABNORMAL
SPECIMEN DESCRIPTION: ABNORMAL
SPECIMEN DESCRIPTION: NORMAL
SPECIMEN DESCRIPTION: NORMAL
STATUS: ABNORMAL
STATUS: NORMAL
STATUS: NORMAL

## 2018-02-05 LAB
CULTURE: ABNORMAL
DIRECT EXAM: ABNORMAL
DIRECT EXAM: ABNORMAL
Lab: ABNORMAL
SPECIMEN DESCRIPTION: ABNORMAL
SPECIMEN DESCRIPTION: ABNORMAL
STATUS: ABNORMAL

## 2018-02-06 ENCOUNTER — OFFICE VISIT (OUTPATIENT)
Dept: PODIATRY | Age: 53
End: 2018-02-06

## 2018-02-06 VITALS
SYSTOLIC BLOOD PRESSURE: 138 MMHG | HEIGHT: 68 IN | TEMPERATURE: 97.2 F | HEART RATE: 74 BPM | RESPIRATION RATE: 18 BRPM | DIASTOLIC BLOOD PRESSURE: 72 MMHG

## 2018-02-06 DIAGNOSIS — Z47.89 ORTHOPEDIC AFTERCARE: Primary | ICD-10-CM

## 2018-02-06 DIAGNOSIS — A49.02 INFECTION WITH METHICILLIN-RESISTANT STAPHYLOCOCCUS AUREUS (MRSA): ICD-10-CM

## 2018-02-06 DIAGNOSIS — M86.172 OSTEOMYELITIS OF ANKLE OR FOOT, ACUTE, LEFT (HCC): ICD-10-CM

## 2018-02-06 PROCEDURE — 99024 POSTOP FOLLOW-UP VISIT: CPT | Performed by: PODIATRIST

## 2018-02-06 RX ORDER — DOXYCYCLINE HYCLATE 100 MG
100 TABLET ORAL 2 TIMES DAILY
Qty: 14 TABLET | Refills: 0 | Status: SHIPPED | OUTPATIENT
Start: 2018-02-06 | End: 2019-08-14 | Stop reason: ALTCHOICE

## 2018-02-06 NOTE — PATIENT INSTRUCTIONS
Patient Discharge Instructions    CALL [phone] for questions regarding care of your wounds. OFFICE HOURS ARE WED AND Thursday 8 A. M. TO 4:30 P. M. And subject to change without notice    Discharge instructions for the patient's plan of care. Wound care:  Right Great toe  Keep dressing dry and intact till next visit. May remove dressing on left 3rd toe on Thursday. Doxycycline as ordered  Return to clinic Tuesday 2/13/18 at 10:30AM  Comments: We hope we gave you VERY GOOD care today!

## 2018-02-06 NOTE — PROGRESS NOTES
POV #1    POD #4  (2nd procedure)    POD #6 (1st procedure )   Cc: \" no pain now - after 2nd day\"  HPI: partial hallux amputation ; f/u delayed primary closure         P.o. ATB (bactrim DS)  ROS: neg. constitutional   MRR:   Past Medical History:   Diagnosis Date    Endometriosis     Headache(784.0)     Hyperlipidemia     Hypertension     MRSA (methicillin resistant staph aureus) culture positive 01/31/2018    Obesity     Restless legs syndrome     Type 2 diabetes mellitus [facility])      Past Surgical History:   Procedure Laterality Date    APPENDECTOMY  1989    BUNIONECTOMY Left 08/26/2016   [facility]; 1990    COLONOSCOPY  11/13/2017    Dr. Colin Hill prep-repeat 3-5 years with 2 day prep)   [address]    FOOT SURGERY Left 10/11/2016    Resection infected non-union Lt.  Great toe; insertion antibx bone cement plug    FOOT SURGERY Left 12/21/2016    total arthroplasty with implant big toe    HYSTERECTOMY, TOTAL ABDOMINAL  1991    KNEE ARTHROSCOPY Right 2017    DE COLON CA SCRN NOT HI RSK IND N/A 11/13/2017    COLONOSCOPY performed by Titi Chappell MD at [facility] Right 1/31/2018    TOE AMPUTATION performed by Raine Barbour DPM at [address] EXTEN/COMPLIC Right 8/1/8368    FOOT FLAP CLOSURE-DELAYED PRIMARY    AND DEBRIDEMENT OF THIRD TOE LEFT FOOT performed by Raine Barbour DPM at [facility] Right 01/31/2018    Dr Carmen Acharya- Right big toe   [address]  2011       Current Outpatient Prescriptions:     doxycycline hyclate (VIBRA-TABS) 100 MG tablet, Take 1 tablet by mouth 2 times daily for 7 days, Disp: 14 tablet, Rfl: 0    losartan (COZAAR) 100 MG tablet, TAKE ONE TABLET BY MOUTH DAILY, Disp: 30 tablet, Rfl: 9    L-Methylfolate-Algae-B12-B6 (METANX PO), Take by mouth daily, Disp: , Rfl:     MELATONIN PO, Take 10 mg by mouth, Disp: , Rfl:     allopurinol (ZYLOPRIM) 100 MG tablet, TAKE ONE TABLET BY MOUTH DAILY, Disp: 30 tablet, Rfl: 11    Calcium-Vitamin D (CALTRATE 600 PLUS-VIT D PO), Take 1 tablet by mouth daily, Disp: , Rfl:     Multiple Vitamins-Minerals (THERAPEUTIC MULTIVITAMIN-MINERALS) tablet, Take 1 tablet by mouth daily, Disp: , Rfl:     b complex vitamins capsule, Take 1 capsule by mouth daily, Disp: , Rfl:     vitamin D (CHOLECALCIFEROL) 1000 UNIT TABS tablet, Take 1,000 Units by mouth daily, Disp: , Rfl:     Insulin Glargine (TOUJEO SOLOSTAR) 300 UNIT/ML SOPN, Inject 45 Units into the skin nightly , Disp: 3 Pen, Rfl:     imipramine (TOFRANIL) 50 MG tablet, Take 75 mg by mouth nightly , Disp: , Rfl:     atorvastatin (LIPITOR) 80 MG tablet, TAKE ONE TABLET BY MOUTH EVERY DAY (Patient taking differently: 40 mg TAKE ONE TABLET BY MOUTH EVERY DAY), Disp: 30 tablet, Rfl: 11    INVOKANA 300 MG TABS tablet, Take 300 mg by mouth daily , Disp: , Rfl:     ONE TOUCH ULTRA TEST strip, 1 each daily , Disp: , Rfl:     LYRICA 300 MG capsule, Take 300 mg by mouth nightly , Disp: , Rfl:     metoprolol (TOPROL-XL) 50 MG XL tablet, Take 50 mg by mouth 2 times daily , Disp: , Rfl:     metFORMIN ER (GLUCOPHAGE-XR) 500 MG XR tablet, Take 1,000 mg by mouth 2 times daily , Disp: , Rfl:     CLICKFINE PEN NEEDLES 31G X 8 MM MISC, 1 each daily , Disp: , Rfl:     exenatide (BYETTA 10 MCG PEN) 10 MCG/0.04ML injection, Inject 10 mcg into the skin 2 times daily (with meals) , Disp: , Rfl:     glimepiride (AMARYL) 4 MG tablet, Take 8 mg by mouth every morning (before breakfast) , Disp: , Rfl:   No Known Allergies    PE: VSS, Afebrile, NAD, A&O x 4, ambulatory in [facility]          RLE : bandage C,D, & I (removed)                   Right hallux ; incision coapt , w/o drainage , tension,edema  or erythema                                        -PMT on direct exam                                         Photo documentation                     Labs: culture ;CA-MRSA isolate / sulfa resistant                               Bone histology ; #2 specimen clear margins   IMP: orthopedic osteomyelitis aftercare  ; Hx delayed primary closure           Ablative documentation           Sutures remain functional   REC: change p.o.  ATB to Doxycycline while sutures remain // D/c Bactrim   P: RTC 1 week suture removal      Tx: LUMA applied                                    [facility]  2/6/2018  11:13 AM

## 2018-02-13 ENCOUNTER — OFFICE VISIT (OUTPATIENT)
Dept: PODIATRY | Age: 53
End: 2018-02-13

## 2018-02-13 VITALS
TEMPERATURE: 96.5 F | SYSTOLIC BLOOD PRESSURE: 134 MMHG | HEIGHT: 68 IN | HEART RATE: 72 BPM | DIASTOLIC BLOOD PRESSURE: 75 MMHG | RESPIRATION RATE: 18 BRPM

## 2018-02-13 DIAGNOSIS — Z48.02 ENCOUNTER FOR REMOVAL OF SUTURES: ICD-10-CM

## 2018-02-13 DIAGNOSIS — Z78.9 PRESENCE OF SURGICAL INCISION: Primary | ICD-10-CM

## 2018-02-13 PROCEDURE — 99024 POSTOP FOLLOW-UP VISIT: CPT | Performed by: PODIATRIST

## 2018-02-13 NOTE — PATIENT INSTRUCTIONS
Patient Discharge Instructions    CALL [phone] for questions regarding care. OFFICE HOURS ARE WED AND Thursday 8 A. M. TO 4:30 P. M. And subject to change without notice    Discharge instructions for the patient's plan of care. Right great toe and left 3rd toe   Remove dressing in 48 hours (Thursday 2/15/18)  Then may soak in warm epsom salt water daily for 5-10 minutes. Apply antibiotic ointment and dry dressing. Return to clinic Tuesday 2/27/18 at 10:30AM    SAS shoes extra depth toe      Comments: We hope we gave you VERY GOOD care today!

## 2018-02-27 ENCOUNTER — HOSPITAL ENCOUNTER (OUTPATIENT)
Dept: LAB | Age: 53
Setting detail: SPECIMEN
Discharge: HOME | End: 2018-02-27
Payer: COMMERCIAL

## 2018-02-27 ENCOUNTER — OFFICE VISIT (OUTPATIENT)
Dept: PODIATRY | Age: 53
End: 2018-02-27
Payer: COMMERCIAL

## 2018-02-27 ENCOUNTER — HOSPITAL ENCOUNTER (OUTPATIENT)
Dept: GENERAL RADIOLOGY | Age: 53
Discharge: HOME | End: 2018-03-01
Payer: COMMERCIAL

## 2018-02-27 ENCOUNTER — HOSPITAL ENCOUNTER (OUTPATIENT)
Age: 53
Discharge: HOME | End: 2018-03-01
Payer: COMMERCIAL

## 2018-02-27 VITALS
DIASTOLIC BLOOD PRESSURE: 72 MMHG | SYSTOLIC BLOOD PRESSURE: 163 MMHG | HEART RATE: 87 BPM | TEMPERATURE: 99.2 F | HEIGHT: 68 IN | RESPIRATION RATE: 18 BRPM

## 2018-02-27 DIAGNOSIS — L02.612: ICD-10-CM

## 2018-02-27 DIAGNOSIS — L02.612: Primary | ICD-10-CM

## 2018-02-27 PROCEDURE — 87070 CULTURE OTHR SPECIMN AEROBIC: CPT

## 2018-02-27 PROCEDURE — 86403 PARTICLE AGGLUT ANTBDY SCRN: CPT

## 2018-02-27 PROCEDURE — 87205 SMEAR GRAM STAIN: CPT

## 2018-02-27 PROCEDURE — 73660 X-RAY EXAM OF TOE(S): CPT

## 2018-02-27 PROCEDURE — 87075 CULTR BACTERIA EXCEPT BLOOD: CPT

## 2018-02-27 PROCEDURE — 10060 I&D ABSCESS SIMPLE/SINGLE: CPT | Performed by: PODIATRIST

## 2018-02-27 NOTE — PATIENT INSTRUCTIONS
Wound Management Patient Discharge Instructions    CALL [phone] for questions regarding care of your wounds. OFFICE HOURS ARE WED AND Thursday 8 A. M. TO 4:30 P. M. And subject to change without notice    Discharge instructions for the patient's plan of care. Wound care:  Right great toe remove dressing in 2 days, then may begin to soak again and dry dressing. Left 3rd toe remove dressing tomorrow and then apply dry dressing. Xray today of left 3rd toe. Culture done today of left 3rd toe. Return to clinic Tuesday 3/6/18 at 10:30AM      Comments: We hope we gave you VERY GOOD care today!

## 2018-02-28 NOTE — PROGRESS NOTES
Subjective:      Patient ID: Brennen Watts is a 47 y.o. female. Cc: \" now the 3rd toe left foot   HPI ; apparent insidious non progressive onset            progressive additional redness and swelling x 1 week           Drainage 2-3 days ago            Neosporin / band aid               Review of Systems -constitutional                                    +toe swelling and drainage                                   -claudication                                    -+DM LOPS    Objective:   Physical Exam 51 Y/O WF, WD/WN, A&O x 3,                           VSS, Afebrile, NAD, ambulatory          LLE ; 3rd toe -- edema / erythema , with wound and drainage                                   -PMT on direct exam                                   Photo documentation                    X-ray pending     Assessment:      Abscess and cellulitis , dorsal 3rd toe , left -- progressive       Plan:       Rx: X-ray   Hold on Empiric ATB therapy   May need bone scan , if no response to therapeutic ATB     Tx:     Incision and Drainage Note    Type of Incision and Drainage:      Simple    Performed by: Conner Brandon DPM    Consent obtained: Yes    Time out taken: Yes    Pain Control:   N/A    Drainage Type: purulent    Instruments: curette and spatula     Location of Incision and Drainage:    Wound/Ulcer #: 1    Incision and Drainage Size cm  Wound 01/30/18 Other (Comment) (Active)   Wound Type Wound 2/2/2018  7:26 AM   Dressing Status Clean;Dry; Intact 2/2/2018  7:26 AM   Dressing/Treatment Dry dressing 2/2/2018  7:26 AM   Number of days: 29       Wound 02/02/18 Other (Comment) Toe (Comment  which one) Anterior Toe is swollen with a blister/scab like area (Active)   Wound Diabetic 2/2/2018  6:58 AM   Dressing/Treatment Open to air 2/2/2018  7:26 AM   Number of days: 26       Incision 08/26/16 Foot Left (Active)   Number of days: 551       Incision 10/11/16 Foot Left (Active)   Number of days: 504       Incision 12/21/16 Foot Left

## 2018-03-01 ENCOUNTER — TELEPHONE (OUTPATIENT)
Dept: PODIATRY | Age: 53
End: 2018-03-01

## 2018-03-01 DIAGNOSIS — L02.612: Primary | ICD-10-CM

## 2018-03-01 DIAGNOSIS — B95.1 STREPTOCOCCUS, GROUP B, AS THE CAUSE OF DISEASES CLASSIFIED ELSEWHERE: ICD-10-CM

## 2018-03-04 LAB
CULTURE: ABNORMAL
DIRECT EXAM: ABNORMAL
DIRECT EXAM: ABNORMAL
Lab: ABNORMAL
Lab: ABNORMAL
SPECIMEN DESCRIPTION: ABNORMAL
SPECIMEN DESCRIPTION: ABNORMAL
STATUS: ABNORMAL

## 2018-03-06 ENCOUNTER — OFFICE VISIT (OUTPATIENT)
Dept: PODIATRY | Age: 53
End: 2018-03-06

## 2018-03-06 VITALS
RESPIRATION RATE: 18 BRPM | DIASTOLIC BLOOD PRESSURE: 77 MMHG | HEIGHT: 68 IN | HEART RATE: 73 BPM | TEMPERATURE: 98.1 F | SYSTOLIC BLOOD PRESSURE: 134 MMHG

## 2018-03-06 DIAGNOSIS — L02.612: Primary | ICD-10-CM

## 2018-03-06 PROCEDURE — 99024 POSTOP FOLLOW-UP VISIT: CPT | Performed by: PODIATRIST

## 2018-03-12 ENCOUNTER — HOSPITAL ENCOUNTER (OUTPATIENT)
Dept: NUCLEAR MEDICINE | Age: 53
Discharge: HOME | End: 2018-03-14
Payer: COMMERCIAL

## 2018-03-12 DIAGNOSIS — L02.612: ICD-10-CM

## 2018-03-12 PROCEDURE — 78315 BONE IMAGING 3 PHASE: CPT

## 2018-03-12 PROCEDURE — A9503 TC99M MEDRONATE: HCPCS | Performed by: PODIATRIST

## 2018-03-12 PROCEDURE — 3430000000 HC RX DIAGNOSTIC RADIOPHARMACEUTICAL: Performed by: PODIATRIST

## 2018-03-12 RX ORDER — TC 99M MEDRONATE 20 MG/10ML
20 INJECTION, POWDER, LYOPHILIZED, FOR SOLUTION INTRAVENOUS
Status: COMPLETED | OUTPATIENT
Start: 2018-03-12 | End: 2018-03-12

## 2018-03-12 RX ADMIN — Medication 20 MILLICURIE: at 08:36

## 2018-03-13 ENCOUNTER — OFFICE VISIT (OUTPATIENT)
Dept: PODIATRY | Age: 53
End: 2018-03-13

## 2018-03-13 VITALS
HEIGHT: 68 IN | TEMPERATURE: 96.4 F | SYSTOLIC BLOOD PRESSURE: 132 MMHG | DIASTOLIC BLOOD PRESSURE: 78 MMHG | HEART RATE: 70 BPM | RESPIRATION RATE: 18 BRPM

## 2018-03-13 DIAGNOSIS — L02.612: ICD-10-CM

## 2018-03-13 DIAGNOSIS — M86.172 ACUTE OSTEOMYELITIS OF TOE OF LEFT FOOT (HCC): Primary | ICD-10-CM

## 2018-03-13 DIAGNOSIS — B95.1 STREPTOCOCCUS, GROUP B, AS THE CAUSE OF DISEASES CLASSIFIED ELSEWHERE: ICD-10-CM

## 2018-03-13 PROCEDURE — 99024 POSTOP FOLLOW-UP VISIT: CPT | Performed by: PODIATRIST

## 2018-03-14 NOTE — PROGRESS NOTES
Subjective:      Patient ID: Jaylin Henderson is a 47 y.o. female. Represents s/p Tc99 Bone scan  Cc: Left 3rd toe ; remains swollen and slightly red  N: \" going to [place] tomorrow for Family emergency -- gone 8 days\"    HPI  p.o. ATB ; Diclox 500 bid           DSD to 3rd toe Left    Review of Systems -constitutional                                   +DM LOPS                                   +DM digital amputations   MRR:   Past Medical History:   Diagnosis Date    Endometriosis     Headache(784.0)     Hyperlipidemia     Hypertension     MRSA (methicillin resistant staph aureus) culture positive 01/31/2018    Obesity     Restless legs syndrome     Type 2 diabetes mellitus [facility])      Past Surgical History:   Procedure Laterality Date    APPENDECTOMY  1989    BUNIONECTOMY Left 08/26/2016   [facility]; 1990    COLONOSCOPY  11/13/2017    Dr. Trish Al prep-repeat 3-5 years with 2 day prep)   [facility]    FOOT SURGERY Left 10/11/2016    Resection infected non-union Lt.  Great toe; insertion antibx bone cement plug    FOOT SURGERY Left 12/21/2016    total arthroplasty with implant big toe    HYSTERECTOMY, TOTAL ABDOMINAL  1991    KNEE ARTHROSCOPY Right 2017    VT COLON CA SCRN NOT HI RSK IND N/A 11/13/2017    COLONOSCOPY performed by Mildred Quiroz MD at [facility] Right 1/31/2018    TOE AMPUTATION performed by Aurora Redding DPM at [address] EXTEN/COMPLIC Right 8/5/0985    FOOT FLAP CLOSURE-DELAYED PRIMARY    AND DEBRIDEMENT OF THIRD TOE LEFT FOOT performed by Aurora Redding DPM at [facility] Right 01/31/2018    Dr Mansi Anderson- Right big toe   [address] [facility] [address]  2011     No Known Allergies    Current Outpatient Prescriptions:     dicloxacillin (DYNAPEN) 500 MG capsule, Take 1 capsule by mouth 2 times daily for 10 days, Disp: 20 capsule, Rfl: 0    losartan (COZAAR)

## 2018-03-27 ENCOUNTER — OFFICE VISIT (OUTPATIENT)
Dept: PODIATRY | Age: 53
End: 2018-03-27

## 2018-03-27 VITALS
SYSTOLIC BLOOD PRESSURE: 133 MMHG | TEMPERATURE: 96.9 F | DIASTOLIC BLOOD PRESSURE: 78 MMHG | HEIGHT: 68 IN | RESPIRATION RATE: 18 BRPM | HEART RATE: 72 BPM

## 2018-03-27 DIAGNOSIS — M86.172 ACUTE OSTEOMYELITIS OF TOE OF LEFT FOOT (HCC): Primary | ICD-10-CM

## 2018-03-27 PROCEDURE — 99024 POSTOP FOLLOW-UP VISIT: CPT | Performed by: PODIATRIST

## 2018-03-27 NOTE — PATIENT INSTRUCTIONS
Patient Discharge Instructions    CALL [phone] for questions regarding care. OFFICE HOURS ARE WED AND Thursday 8 A. M. TO 4:30 P. M. And subject to change without notice    Discharge instructions for the patient's plan of care. Right great toe and left 3rd toe    Apply antibiotic ointment on areas daily. Return to clinic Tuesday 4/10/18 at 10:00AM    Comments: We hope we gave you VERY GOOD care today!

## 2018-03-27 NOTE — PROGRESS NOTES
Disp: , Rfl:     metoprolol (TOPROL-XL) 50 MG XL tablet, Take 50 mg by mouth 2 times daily , Disp: , Rfl:     metFORMIN ER (GLUCOPHAGE-XR) 500 MG XR tablet, Take 1,000 mg by mouth 2 times daily , Disp: , Rfl:     CLICKFINE PEN NEEDLES 31G X 8 MM MISC, 1 each daily , Disp: , Rfl:     exenatide (BYETTA 10 MCG PEN) 10 MCG/0.04ML injection, Inject 10 mcg into the skin 2 times daily (with meals) , Disp: , Rfl:     glimepiride (AMARYL) 4 MG tablet, Take 8 mg by mouth every morning (before breakfast) , Disp: , Rfl:     PE: VSS, Afebrile, NAD, A&O x 4, ambulatory ; bipedal , plantigrade and propulsive          LLE ; 3rd toe  ; residual minor HT                                   Residual edema , without erythema                                   Residual small FT ulceration @ PIPJ                                  -PMT on direct exam   IMP: DM ulceration of 3rd oe  ; resolving           Cellulitis / abscess of 3rd toe  ; resolving          Previous positive bone scan   REC: monitor 3rd toe for recurrent abscess   P: RTC 2 weeks    Ramon Manjarrez Sunday Males  3/27/2018  10:15 AM

## 2018-04-10 ENCOUNTER — OFFICE VISIT (OUTPATIENT)
Dept: PODIATRY | Age: 53
End: 2018-04-10
Payer: COMMERCIAL

## 2018-04-10 VITALS
HEART RATE: 72 BPM | SYSTOLIC BLOOD PRESSURE: 142 MMHG | RESPIRATION RATE: 18 BRPM | TEMPERATURE: 96.6 F | HEIGHT: 68 IN | DIASTOLIC BLOOD PRESSURE: 76 MMHG

## 2018-04-10 DIAGNOSIS — M86.172 ACUTE OSTEOMYELITIS OF TOE OF LEFT FOOT (HCC): Primary | ICD-10-CM

## 2018-04-10 DIAGNOSIS — L02.612: ICD-10-CM

## 2018-04-10 PROCEDURE — 99213 OFFICE O/P EST LOW 20 MIN: CPT | Performed by: PODIATRIST

## 2018-04-11 ENCOUNTER — HOSPITAL ENCOUNTER (OUTPATIENT)
Age: 53
Setting detail: OUTPATIENT SURGERY
Discharge: HOME | End: 2018-04-11
Attending: PODIATRIST | Admitting: PODIATRIST
Payer: COMMERCIAL

## 2018-04-11 VITALS
HEART RATE: 62 BPM | SYSTOLIC BLOOD PRESSURE: 153 MMHG | TEMPERATURE: 97.6 F | DIASTOLIC BLOOD PRESSURE: 91 MMHG | RESPIRATION RATE: 18 BRPM | OXYGEN SATURATION: 98 %

## 2018-04-11 PROBLEM — M86.171 ACUTE OSTEOMYELITIS OF TOE OF RIGHT FOOT (HCC): Status: RESOLVED | Noted: 2018-01-30 | Resolved: 2018-04-11

## 2018-04-11 PROBLEM — N39.0 UTI (URINARY TRACT INFECTION): Status: RESOLVED | Noted: 2018-01-30 | Resolved: 2018-04-11

## 2018-04-11 PROBLEM — L02.611 ABSCESS OF GREAT TOE OF RIGHT FOOT: Status: RESOLVED | Noted: 2018-01-30 | Resolved: 2018-04-11

## 2018-04-11 PROBLEM — M86.172 ACUTE OSTEOMYELITIS OF TOE OF LEFT FOOT (HCC): Status: ACTIVE | Noted: 2018-04-11

## 2018-04-11 PROBLEM — S91.101A OPEN WOUND OF RIGHT GREAT TOE: Status: RESOLVED | Noted: 2018-02-02 | Resolved: 2018-04-11

## 2018-04-11 LAB — GLUCOSE BLD-MCNC: 75 MG/DL (ref 74–100)

## 2018-04-11 PROCEDURE — 88311 DECALCIFY TISSUE: CPT

## 2018-04-11 PROCEDURE — 87075 CULTR BACTERIA EXCEPT BLOOD: CPT

## 2018-04-11 PROCEDURE — 86403 PARTICLE AGGLUT ANTBDY SCRN: CPT

## 2018-04-11 PROCEDURE — 6360000002 HC RX W HCPCS: Performed by: PODIATRIST

## 2018-04-11 PROCEDURE — 3600000012 HC SURGERY LEVEL 2 ADDTL 15MIN: Performed by: PODIATRIST

## 2018-04-11 PROCEDURE — 2720000010 HC SURG SUPPLY STERILE: Performed by: PODIATRIST

## 2018-04-11 PROCEDURE — 2580000003 HC RX 258: Performed by: PODIATRIST

## 2018-04-11 PROCEDURE — 2500000003 HC RX 250 WO HCPCS: Performed by: PODIATRIST

## 2018-04-11 PROCEDURE — 88304 TISSUE EXAM BY PATHOLOGIST: CPT

## 2018-04-11 PROCEDURE — 87070 CULTURE OTHR SPECIMN AEROBIC: CPT

## 2018-04-11 PROCEDURE — 28124 PARTIAL REMOVAL OF TOE: CPT | Performed by: PODIATRIST

## 2018-04-11 PROCEDURE — 87186 SC STD MICRODIL/AGAR DIL: CPT

## 2018-04-11 PROCEDURE — 87205 SMEAR GRAM STAIN: CPT

## 2018-04-11 PROCEDURE — 82947 ASSAY GLUCOSE BLOOD QUANT: CPT

## 2018-04-11 PROCEDURE — 3600000002 HC SURGERY LEVEL 2 BASE: Performed by: PODIATRIST

## 2018-04-11 RX ORDER — ACETAMINOPHEN 500 MG
1000 TABLET ORAL ONCE
Status: DISCONTINUED | OUTPATIENT
Start: 2018-04-11 | End: 2018-04-11

## 2018-04-11 RX ORDER — SODIUM CHLORIDE, SODIUM LACTATE, POTASSIUM CHLORIDE, CALCIUM CHLORIDE 600; 310; 30; 20 MG/100ML; MG/100ML; MG/100ML; MG/100ML
INJECTION, SOLUTION INTRAVENOUS CONTINUOUS
Status: DISCONTINUED | OUTPATIENT
Start: 2018-04-11 | End: 2018-04-11 | Stop reason: HOSPADM

## 2018-04-11 RX ORDER — GENTAMICIN SULFATE 3 MG/ML
SOLUTION/ DROPS OPHTHALMIC
Qty: 10 ML | Refills: 1 | Status: SHIPPED | OUTPATIENT
Start: 2018-04-11 | End: 2019-08-14 | Stop reason: ALTCHOICE

## 2018-04-11 RX ORDER — SODIUM CHLORIDE 0.9 % (FLUSH) 0.9 %
10 SYRINGE (ML) INJECTION PRN
Status: DISCONTINUED | OUTPATIENT
Start: 2018-04-11 | End: 2018-04-11 | Stop reason: HOSPADM

## 2018-04-11 RX ORDER — SULFAMETHOXAZOLE AND TRIMETHOPRIM 800; 160 MG/1; MG/1
1 TABLET ORAL 2 TIMES DAILY
Qty: 20 TABLET | Refills: 0 | Status: SHIPPED | OUTPATIENT
Start: 2018-04-11 | End: 2018-04-17 | Stop reason: ALTCHOICE

## 2018-04-11 RX ORDER — BUPIVACAINE HYDROCHLORIDE AND EPINEPHRINE 5; 5 MG/ML; UG/ML
INJECTION, SOLUTION EPIDURAL; INTRACAUDAL; PERINEURAL PRN
Status: DISCONTINUED | OUTPATIENT
Start: 2018-04-11 | End: 2018-04-11 | Stop reason: HOSPADM

## 2018-04-11 RX ORDER — SODIUM CHLORIDE 0.9 % (FLUSH) 0.9 %
10 SYRINGE (ML) INJECTION EVERY 12 HOURS SCHEDULED
Status: DISCONTINUED | OUTPATIENT
Start: 2018-04-11 | End: 2018-04-11 | Stop reason: HOSPADM

## 2018-04-11 RX ORDER — ASPIRIN 81 MG/1
81 TABLET, CHEWABLE ORAL DAILY
COMMUNITY
End: 2019-08-14 | Stop reason: SDUPTHER

## 2018-04-11 RX ADMIN — VANCOMYCIN HYDROCHLORIDE 1500 MG: 500 INJECTION, POWDER, LYOPHILIZED, FOR SOLUTION INTRAVENOUS at 07:46

## 2018-04-11 ASSESSMENT — PAIN SCALES - GENERAL: PAINLEVEL_OUTOF10: 0

## 2018-04-12 ENCOUNTER — OFFICE VISIT (OUTPATIENT)
Dept: PODIATRY | Age: 53
End: 2018-04-12
Payer: COMMERCIAL

## 2018-04-12 VITALS
DIASTOLIC BLOOD PRESSURE: 87 MMHG | HEIGHT: 68 IN | TEMPERATURE: 98.6 F | SYSTOLIC BLOOD PRESSURE: 165 MMHG | RESPIRATION RATE: 18 BRPM | HEART RATE: 69 BPM

## 2018-04-12 DIAGNOSIS — Z78.9 PRESENCE OF SURGICAL INCISION: ICD-10-CM

## 2018-04-12 DIAGNOSIS — S91.109A OPEN WOUND OF TOE, INITIAL ENCOUNTER: Primary | ICD-10-CM

## 2018-04-12 PROBLEM — Z12.11 COLON CANCER SCREENING: Status: RESOLVED | Noted: 2017-07-31 | Resolved: 2018-04-12

## 2018-04-12 PROCEDURE — 11042 DBRDMT SUBQ TIS 1ST 20SQCM/<: CPT | Performed by: PODIATRIST

## 2018-04-12 PROCEDURE — 99024 POSTOP FOLLOW-UP VISIT: CPT | Performed by: PODIATRIST

## 2018-04-13 LAB — SURGICAL PATHOLOGY REPORT: NORMAL

## 2018-04-16 LAB
CULTURE: ABNORMAL
DIRECT EXAM: ABNORMAL
DIRECT EXAM: ABNORMAL
Lab: ABNORMAL
Lab: ABNORMAL
ORGANISM: ABNORMAL
SPECIMEN DESCRIPTION: ABNORMAL
SPECIMEN DESCRIPTION: ABNORMAL
STATUS: ABNORMAL

## 2018-04-17 ENCOUNTER — OFFICE VISIT (OUTPATIENT)
Dept: PODIATRY | Age: 53
End: 2018-04-17
Payer: COMMERCIAL

## 2018-04-17 VITALS
HEART RATE: 69 BPM | RESPIRATION RATE: 18 BRPM | HEIGHT: 68 IN | DIASTOLIC BLOOD PRESSURE: 72 MMHG | SYSTOLIC BLOOD PRESSURE: 138 MMHG | TEMPERATURE: 97.3 F

## 2018-04-17 DIAGNOSIS — A49.02 INFECTION WITH METHICILLIN-RESISTANT STAPHYLOCOCCUS AUREUS (MRSA): ICD-10-CM

## 2018-04-17 DIAGNOSIS — S91.109A OPEN WOUND OF TOE, INITIAL ENCOUNTER: Primary | ICD-10-CM

## 2018-04-17 PROCEDURE — 99024 POSTOP FOLLOW-UP VISIT: CPT | Performed by: PODIATRIST

## 2018-04-17 PROCEDURE — 97597 DBRDMT OPN WND 1ST 20 CM/<: CPT | Performed by: PODIATRIST

## 2018-04-17 RX ORDER — DOXYCYCLINE HYCLATE 100 MG
100 TABLET ORAL 2 TIMES DAILY
Qty: 30 TABLET | Refills: 0 | Status: SHIPPED | OUTPATIENT
Start: 2018-04-17 | End: 2019-08-14 | Stop reason: ALTCHOICE

## 2018-05-01 ENCOUNTER — OFFICE VISIT (OUTPATIENT)
Dept: PODIATRY | Age: 53
End: 2018-05-01

## 2018-05-01 VITALS
SYSTOLIC BLOOD PRESSURE: 133 MMHG | TEMPERATURE: 97.9 F | DIASTOLIC BLOOD PRESSURE: 75 MMHG | RESPIRATION RATE: 18 BRPM | HEART RATE: 78 BPM | HEIGHT: 68 IN

## 2018-05-01 DIAGNOSIS — Z78.9 PRESENCE OF SURGICAL INCISION: Primary | ICD-10-CM

## 2018-05-01 PROCEDURE — 99024 POSTOP FOLLOW-UP VISIT: CPT | Performed by: PODIATRIST

## 2018-05-15 ENCOUNTER — OFFICE VISIT (OUTPATIENT)
Dept: PODIATRY | Age: 53
End: 2018-05-15

## 2018-05-15 VITALS
HEART RATE: 78 BPM | TEMPERATURE: 98.1 F | DIASTOLIC BLOOD PRESSURE: 76 MMHG | RESPIRATION RATE: 18 BRPM | HEIGHT: 68 IN | SYSTOLIC BLOOD PRESSURE: 125 MMHG

## 2018-05-15 DIAGNOSIS — Z78.9 PRESENCE OF SURGICAL INCISION: Primary | ICD-10-CM

## 2018-05-15 PROCEDURE — 99024 POSTOP FOLLOW-UP VISIT: CPT | Performed by: PODIATRIST

## 2018-07-17 ENCOUNTER — OFFICE VISIT (OUTPATIENT)
Dept: PODIATRY | Age: 53
End: 2018-07-17
Payer: COMMERCIAL

## 2018-07-17 VITALS
TEMPERATURE: 97.5 F | SYSTOLIC BLOOD PRESSURE: 160 MMHG | DIASTOLIC BLOOD PRESSURE: 92 MMHG | HEIGHT: 67 IN | RESPIRATION RATE: 18 BRPM | HEART RATE: 70 BPM

## 2018-07-17 DIAGNOSIS — E11.42 DIABETIC POLYNEUROPATHY ASSOCIATED WITH TYPE 2 DIABETES MELLITUS (HCC): ICD-10-CM

## 2018-07-17 DIAGNOSIS — M20.5X1 CLAWTOE, ACQUIRED, RIGHT: Primary | ICD-10-CM

## 2018-07-17 PROBLEM — M86.172 ACUTE OSTEOMYELITIS OF TOE OF LEFT FOOT (HCC): Status: RESOLVED | Noted: 2018-04-11 | Resolved: 2018-07-17

## 2018-07-17 PROBLEM — L02.612 ABSCESS OF THIRD TOE OF LEFT FOOT: Status: RESOLVED | Noted: 2018-02-27 | Resolved: 2018-07-17

## 2018-07-17 PROBLEM — A49.02 INFECTION WITH METHICILLIN-RESISTANT STAPHYLOCOCCUS AUREUS (MRSA): Status: RESOLVED | Noted: 2018-02-02 | Resolved: 2018-07-17

## 2018-07-17 PROCEDURE — 99213 OFFICE O/P EST LOW 20 MIN: CPT | Performed by: PODIATRIST

## 2018-07-17 NOTE — PATIENT INSTRUCTIONS
Patient Discharge Instructions    CALL [phone] for questions regarding care. .  OFFICE HOURS ARE WED AND Thursday 8 A. M. TO 4:30 P. M. And subject to change without notice    Discharge instructions for the patient's plan of care. To schedule procedure when back from vacation. Next appointment:  Future Appointments  Date Time Provider Jericho Gamble   12/18/2018 10:20 AM MD Yariel Velasquez         Comments: We hope we gave you VERY GOOD care today!

## 2018-07-17 NOTE — PROGRESS NOTES
Subjective:      Patient ID: Nohemi Leigh is a 46 y.o. female. well known to myself   MRR: last visit 5/15/2018             Surgery , > 90 days (4/11/2018)  Cc: concerned now about the right foot toe ; R3 > R4 > R2          Walking / ambulatory abnormality   HPI noticed I'm walking on sides of toe 3/4/2 right ; even with DM LOPS         Wearing digital buttress           Sandal shoe gear , just because its summer    Review of Systems +DM LOPS / +amputations                                    -claudication / -angina                                   -constitutional   MRR:   Past Medical History:   Diagnosis Date    Endometriosis     Headache(784.0)     Hyperlipidemia     Hypertension     MRSA (methicillin resistant staph aureus) culture positive 01/31/2018    Obesity     Restless legs syndrome     Type 2 diabetes mellitus ([address]      Past Surgical History:   Procedure Laterality Date    APPENDECTOMY  1989    BUNIONECTOMY Left 08/26/2016   [facility]; 1990    COLONOSCOPY  11/13/2017    Dr. Tito Menendez prep-repeat 3-5 years with 2 day prep)   [address]    FOOT SURGERY Left 10/11/2016    Resection infected non-union Lt.  Great toe; insertion antibx bone cement plug    FOOT SURGERY Left 12/21/2016    total arthroplasty with implant big toe    HYSTERECTOMY, TOTAL ABDOMINAL  1991    KNEE ARTHROSCOPY Right 2017    MS COLON CA SCRN NOT HI RSK IND N/A 11/13/2017    COLONOSCOPY performed by Nicole Inman MD at [address] [facility] [address] OFFICE/OUTPT [facility] Left 4/11/2018    FOOT BONE EXCISION-3RD TOE performed by Alice Lesch, DPM at [facility] Right 1/31/2018    TOE AMPUTATION performed by Alice Lesch, DPM at [address] EXTEN/COMPLIC Right 7/8/1294    FOOT FLAP CLOSURE-DELAYED PRIMARY    AND DEBRIDEMENT OF THIRD TOE LEFT FOOT performed by Alice Lesch, DPM at [facility] Social History     Social History    Marital status:      Spouse name: N/A    Number of children: N/A    Years of education: N/A     Occupational History    Not on file.      Social History Main Topics    Smoking status: Never Smoker    Smokeless tobacco: Never Used    Alcohol use No    Drug use: No    Sexual activity: Yes     Partners: Male     Other Topics Concern    Not on file     Social History Narrative    No narrative on file                 Objective:   Physical Exam 45 Y/O WF, WD/WN, A&O x 3 , NAD                            VSs, Afebrile,                             Ambulatory ; bipedal , plantigrade and propulsive                     RLE ; digits 2/3/4 -- flexible contracture / minimal rigid ; manually reducible 75-90%                                                    Contracture in 2/3 cardinal body planes                                                      No open wounds                                                      Neuro - no 2-pt or epicritic sensation to toes                                                      Vascular  -- +2/4 readily palpable pedal pulses / +CFT < 2 sec                                 Stance  -- 3/4 significant varus component                                 X-ray --  1/30/2018 reviewed     Assessment:      Clawtoe deformity(s) , R2/R3/R4 in DM LOPS foot -- mostly flexible and reducible   Abnormal ambulation /sense of pressure   Complicating LOPS    REC: effective soft tissue procedure ; pre emptive procedure ( flexor tenotomy)             Continue buttress pad       Plan:      Schedule procedure when she returns from [place] ; 1 week         [facility]  7/17/2018  12:11 PM

## 2018-08-15 ENCOUNTER — TELEPHONE (OUTPATIENT)
Dept: PODIATRY | Age: 53
End: 2018-08-15

## 2018-08-15 DIAGNOSIS — Z79.2 PROPHYLACTIC ANTIBIOTIC: Primary | ICD-10-CM

## 2018-08-15 RX ORDER — CEPHALEXIN 500 MG/1
500 CAPSULE ORAL 2 TIMES DAILY
Qty: 10 CAPSULE | Refills: 0 | Status: SHIPPED | OUTPATIENT
Start: 2018-08-15 | End: 2019-08-14 | Stop reason: ALTCHOICE

## 2018-08-16 ENCOUNTER — HOSPITAL ENCOUNTER (OUTPATIENT)
Dept: WOUND CARE | Age: 53
Discharge: HOME | End: 2018-08-16
Payer: COMMERCIAL

## 2018-08-16 VITALS
RESPIRATION RATE: 16 BRPM | BODY MASS INDEX: 43 KG/M2 | TEMPERATURE: 98.9 F | DIASTOLIC BLOOD PRESSURE: 82 MMHG | HEART RATE: 83 BPM | SYSTOLIC BLOOD PRESSURE: 152 MMHG | HEIGHT: 67 IN | WEIGHT: 274 LBS

## 2018-08-16 DIAGNOSIS — M20.5X1 CLAWTOE, ACQUIRED, RIGHT: ICD-10-CM

## 2018-08-16 PROCEDURE — 99999 PR OFFICE/OUTPT VISIT,PROCEDURE ONLY: CPT | Performed by: PODIATRIST

## 2018-08-16 PROCEDURE — 28232 INCISION OF TOE TENDON: CPT

## 2018-08-16 PROCEDURE — 28232 INCISION OF TOE TENDON: CPT | Performed by: PODIATRIST

## 2018-08-16 NOTE — PROGRESS NOTES
Diabetic Quality Measure Benchmarks:    FSBS:  RESULT 181        0800   Hemoglobin A1c Controlled and is < 8%=   Lab Results   Component Value Date    LABA1C 6.6 (H) 06/27/2018         LDL is <100         Yes  Blood Pressure is under < 140/90               No  Tobacco Non use                                         Yes  Aspirin Use                                                    No    Information sent to PCP                                No    Instructions given to Patient regarding follow up and or education      No

## 2018-08-17 NOTE — OP NOTE
distal interphalangeal joint, which was  easily noted by the transverse cleft, a #69 blade was used to create a  small stab incision linearly and parallel toe the long axis of the toe and  also most importantly the neurovascular structures, i.e., centered between  these neurovascular structures. The blade was introduced and deepened into  the subcutaneous tissues and using manual maneuvering of the individual  toes, the long flexor tendon was encountered. The #69 blade was rotated  and transection of the tendon was done. The blade was then deepened into  the level of the distal interphalangeal joint capsule and that capsule was  transected and the joint was entered. Manual testing intraop was done and  the patient had no ability to plantarflex the distal phalanx and the  flexible claw toe was reduced. Attention was directed to the digits right 2, right 3, and right 4 where  the exact the same procedure was done. The incisional portal or stab  incisions were copiously lavaged with saline and a single nylon suture was  placed in simple interrupted fashion, 4-0 nylon was the suture of choice. Each individual toe was dressed with a buttress pad compressive dressing  and then a final dressing, which provide stability in buddie fashion, toes  2 through 4 was placed for additional stability and compression. The patient tolerated the anesthesia and the procedures well and without  complication and was subsequently discharged from the multispecialty clinic  with the vital signs stable, afebrile, and in apparent satisfactory  condition. Instrument, sponge, and needle counts were correct according to  Dr. Randolph Smith.         Carol Sánchez    D: 08/16/2018 17:06:36       T: 08/17/2018 2:15:05     [email or web address]  Job#: [id number]     Doc#: [id number]    CC:

## 2018-08-23 ENCOUNTER — HOSPITAL ENCOUNTER (OUTPATIENT)
Dept: WOUND CARE | Age: 53
Discharge: HOME | End: 2018-08-23
Payer: COMMERCIAL

## 2018-08-23 VITALS
TEMPERATURE: 97.8 F | HEIGHT: 67 IN | SYSTOLIC BLOOD PRESSURE: 160 MMHG | BODY MASS INDEX: 42.91 KG/M2 | HEART RATE: 80 BPM | DIASTOLIC BLOOD PRESSURE: 83 MMHG | RESPIRATION RATE: 16 BRPM

## 2018-08-23 DIAGNOSIS — Z47.89 ORTHOPEDIC AFTERCARE: ICD-10-CM

## 2018-08-23 DIAGNOSIS — Z48.02 ENCOUNTER FOR REMOVAL OF SUTURES: ICD-10-CM

## 2018-08-23 PROCEDURE — 99024 POSTOP FOLLOW-UP VISIT: CPT | Performed by: PODIATRIST

## 2018-08-23 PROCEDURE — 99212 OFFICE O/P EST SF 10 MIN: CPT

## 2018-08-23 NOTE — PROGRESS NOTES
Diabetic Quality Measure Benchmarks:    FSBS:  RESULT did not do  Hemoglobin A1c Controlled and is < 8%=   Lab Results   Component Value Date    LABA1C 6.6 (H) 06/27/2018         LDL is <100         Yes  Blood Pressure is under < 140/90               No  Tobacco Non use                                         Yes  Aspirin Use                                                    Yes    Information sent to PCP                                Yes and No    Instructions given to Patient regarding follow up and or education      Yes

## 2018-11-27 ENCOUNTER — OFFICE VISIT (OUTPATIENT)
Dept: PODIATRY | Age: 53
End: 2018-11-27
Payer: COMMERCIAL

## 2018-11-27 VITALS
SYSTOLIC BLOOD PRESSURE: 166 MMHG | RESPIRATION RATE: 18 BRPM | DIASTOLIC BLOOD PRESSURE: 97 MMHG | TEMPERATURE: 97.5 F | HEIGHT: 67 IN | BODY MASS INDEX: 42.91 KG/M2 | HEART RATE: 75 BPM

## 2018-11-27 DIAGNOSIS — M20.61 ACQUIRED DEFORMITY OF TOE, RIGHT: ICD-10-CM

## 2018-11-27 DIAGNOSIS — M20.5X1 CLAWTOE, ACQUIRED, RIGHT: Primary | ICD-10-CM

## 2018-11-27 PROCEDURE — 99213 OFFICE O/P EST LOW 20 MIN: CPT | Performed by: PODIATRIST

## 2018-11-27 NOTE — PROGRESS NOTES
hypercaog. Psychiatric/Behavioral: Negative. Past Medical History:   Diagnosis Date    Endometriosis     Headache(784.0)     Hyperlipidemia     Hypertension     MRSA (methicillin resistant staph aureus) culture positive 01/31/2018    Obesity     Restless legs syndrome     Type 2 diabetes mellitus [facility])      Past Surgical History:   Procedure Laterality Date    APPENDECTOMY  1989    BUNIONECTOMY Left 08/26/2016   [facility]; 1990    COLONOSCOPY  11/13/2017    Dr. Mitch Bazzi prep-repeat 3-5 years with 2 day prep)   [address]    FOOT SURGERY Left 10/11/2016    Resection infected non-union Lt.  Great toe; insertion antibx bone cement plug    FOOT SURGERY Left 12/21/2016    total arthroplasty with implant big toe    HYSTERECTOMY, TOTAL ABDOMINAL  1991    KNEE ARTHROSCOPY Right 2017    MS COLON CA SCRN NOT HI RSK IND N/A 11/13/2017    COLONOSCOPY performed by Jossy Vivas MD at [address] [facility] OFFICE/OUTPT [facility] Left 4/11/2018    FOOT BONE EXCISION-3RD TOE performed by Adriano Cancino DPM at [facility] Right 1/31/2018    TOE AMPUTATION performed by Adriano Cancino DPM at [facility] [address] EXTEN/COMPLIC Right 0/0/9199    FOOT FLAP CLOSURE-DELAYED PRIMARY    AND DEBRIDEMENT OF THIRD TOE LEFT FOOT performed by Adriano Cancino DPM at [address] Right 01/31/2018    Dr Golden Gear- Right big toe   [address] [facility] [address]  2011     No Known Allergies    Current Outpatient Prescriptions:     losartan (COZAAR) 100 MG tablet, Take 1 tablet by mouth daily, Disp: 30 tablet, Rfl: 8    Handicap Placard MISC, by Does not apply route Duration; 4 years, Disp: 1 each, Rfl: 0    allopurinol (ZYLOPRIM) 100 MG tablet, Take 1 tablet by mouth daily, Disp: 90 tablet, Rfl: 3    aspirin 81 MG chewable tablet, Take 81 mg by mouth daily, Disp: , Rfl:     L-Methylfolate-Algae-B12-B6

## 2018-11-30 ASSESSMENT — ENCOUNTER SYMPTOMS
SORE THROAT: 0
FACIAL SWELLING: 0
EYES NEGATIVE: 1
ANAL BLEEDING: 0
NAUSEA: 0
VOMITING: 0
SINUS PAIN: 0
RESPIRATORY NEGATIVE: 1
CONSTIPATION: 0
COLOR CHANGE: 0
DIARRHEA: 0
TROUBLE SWALLOWING: 0
BACK PAIN: 1
RHINORRHEA: 0

## 2018-12-13 ENCOUNTER — TELEPHONE (OUTPATIENT)
Dept: PODIATRY | Age: 53
End: 2018-12-13

## 2018-12-13 DIAGNOSIS — Z79.2 PROPHYLACTIC ANTIBIOTIC: Primary | ICD-10-CM

## 2018-12-13 RX ORDER — CLINDAMYCIN HYDROCHLORIDE 150 MG/1
150 CAPSULE ORAL 2 TIMES DAILY
Qty: 10 CAPSULE | Refills: 0 | Status: SHIPPED | OUTPATIENT
Start: 2018-12-17 | End: 2019-08-14 | Stop reason: ALTCHOICE

## 2018-12-18 ENCOUNTER — OFFICE VISIT (OUTPATIENT)
Dept: PODIATRY | Age: 53
End: 2018-12-18
Payer: COMMERCIAL

## 2018-12-18 ENCOUNTER — HOSPITAL ENCOUNTER (OUTPATIENT)
Dept: INFUSION THERAPY | Age: 53
Discharge: HOME | End: 2018-12-18
Payer: COMMERCIAL

## 2018-12-18 VITALS
DIASTOLIC BLOOD PRESSURE: 78 MMHG | BODY MASS INDEX: 41.66 KG/M2 | TEMPERATURE: 97.6 F | SYSTOLIC BLOOD PRESSURE: 152 MMHG | HEIGHT: 68 IN | RESPIRATION RATE: 18 BRPM | HEART RATE: 74 BPM

## 2018-12-18 DIAGNOSIS — M20.5X1 CLAWTOE, ACQUIRED, RIGHT: Primary | ICD-10-CM

## 2018-12-18 PROCEDURE — 99999 PR OFFICE/OUTPT VISIT,PROCEDURE ONLY: CPT | Performed by: PODIATRIST

## 2018-12-18 PROCEDURE — 28232 INCISION OF TOE TENDON: CPT | Performed by: PODIATRIST

## 2018-12-18 RX ORDER — LIDOCAINE HYDROCHLORIDE 20 MG/ML
20 INJECTION, SOLUTION INFILTRATION; PERINEURAL ONCE
Status: DISCONTINUED | OUTPATIENT
Start: 2018-12-18 | End: 2018-12-19 | Stop reason: HOSPADM

## 2018-12-19 NOTE — OP NOTE
[address], [place] [address]                                OPERATIVE REPORT    PATIENT NAME: Byron Espinoza                   :        1965  MED REC NO:   [id number]                              ROOM:  ACCOUNT NO:   [de-identified]                           ADMIT DATE: 2018  PROVIDER:     Jessica Molina    DATE OF PROCEDURE:  2018    PREOPERATIVE DIAGNOSIS:  Flexible, rotated claw toe, fifth toe, right  foot, painful. POSTOPERATIVE DIAGNOSES:  Flexible, rotated claw toe, fifth toe, right  foot, painful; apparent additional arthritic contracture. PROCEDURE PERFORMED:  (97739) Flexor tenotomy, right foot, right fifth  toe. OBJECTIVE:  As follows: The patient was visited in the preop time  period and informed consent and the anatomic marking was noted up. The  right fifth toe was anesthetized with a combination of 2% lidocaine  plain and Marcaine 0.5% with epinephrine about 2 mL of each in H-block  fashion. The toe was then prepped and draped in a usual fashion. The patient has been prophylaxed with oral antibiotic beginning 24 hours  period and carried through today's procedure. Her additional review of  systems was without constitutional symptoms and no suspicious bleeding  tendencies. Additionally, the patient is a diabetic, but has  spontaneously healed surgical incisions in her toes and feet numerous  times. Attention was directed to the plantar aspect of the fifth toe where a  vertical cleft that was rotated and certainly off the normal sagittal  plane was noted. The toe itself was manually derotated to be rectus in  all three cardinal planes. At this time on the plantar aspect,  attention was directed and just proximal to the transverse crease of the  proximal interphalangeal joint, a #69 blade was introduced vertically in  the midline between the neurovascular bundles.   The skin incision was  made. The specialized surgical instrument was then taken to the level  of the subcutaneous tissues and deeper into the fascial layer near the  tendons. It was rotated 90 degrees, which allowed for transection of  the flexor tendon and to augment this, the toe was then maximally  dorsiflexed during this period of time. This dorsiflexion and actual  distraction of the toe allowed for the proximal interphalangeal joint to  be entered on its plantar side and a capsulotomy was performed. The  specialized instrument was exited from the surgical wound bed site. The  area was copiously lavaged. Closure of the small incision was done with  two simple interrupted sutures of 3-0 nylon. The toe itself was then  bandaged in a derotated fashion using 3x3s longitudinally and with  special placement emphasis mimicking a buttress pad. The patient tolerated the procedure and the anesthesia well and without  complication and will be discharged from the [place] [address] with vital signs stable, afebrile, and in apparent  satisfactory condition. The instrument, sponge, and needle counts were  correct.         Mc Beltrán    D: 12/18/2018 18:22:03       T: 12/19/2018 4:27:53     [email or web address]  Job#: [id number]     Doc#: [id number]

## 2018-12-20 ENCOUNTER — OFFICE VISIT (OUTPATIENT)
Dept: PODIATRY | Age: 53
End: 2018-12-20

## 2018-12-20 VITALS
HEIGHT: 68 IN | BODY MASS INDEX: 41.66 KG/M2 | SYSTOLIC BLOOD PRESSURE: 152 MMHG | DIASTOLIC BLOOD PRESSURE: 78 MMHG | HEART RATE: 88 BPM | TEMPERATURE: 97.1 F

## 2018-12-20 DIAGNOSIS — Z48.02 ENCOUNTER FOR REMOVAL OF SUTURES: Primary | ICD-10-CM

## 2018-12-20 DIAGNOSIS — Z47.89 ORTHOPEDIC AFTERCARE: ICD-10-CM

## 2018-12-20 PROCEDURE — 99024 POSTOP FOLLOW-UP VISIT: CPT | Performed by: PODIATRIST

## 2019-01-02 ENCOUNTER — OFFICE VISIT (OUTPATIENT)
Dept: PODIATRY | Age: 54
End: 2019-01-02

## 2019-01-02 VITALS
RESPIRATION RATE: 16 BRPM | HEIGHT: 68 IN | SYSTOLIC BLOOD PRESSURE: 143 MMHG | DIASTOLIC BLOOD PRESSURE: 84 MMHG | HEART RATE: 93 BPM | TEMPERATURE: 96.6 F | BODY MASS INDEX: 41.66 KG/M2

## 2019-01-02 DIAGNOSIS — Z47.89 ORTHOPEDIC AFTERCARE: Primary | ICD-10-CM

## 2019-01-02 PROCEDURE — 99024 POSTOP FOLLOW-UP VISIT: CPT | Performed by: PODIATRIST

## 2019-01-12 ENCOUNTER — APPOINTMENT (OUTPATIENT)
Dept: CT IMAGING | Age: 54
End: 2019-01-12
Payer: COMMERCIAL

## 2019-01-12 ENCOUNTER — HOSPITAL ENCOUNTER (EMERGENCY)
Age: 54
Discharge: HOME | End: 2019-01-12
Payer: COMMERCIAL

## 2019-01-12 ENCOUNTER — APPOINTMENT (OUTPATIENT)
Dept: GENERAL RADIOLOGY | Age: 54
End: 2019-01-12
Payer: COMMERCIAL

## 2019-01-12 VITALS
RESPIRATION RATE: 20 BRPM | DIASTOLIC BLOOD PRESSURE: 115 MMHG | SYSTOLIC BLOOD PRESSURE: 191 MMHG | TEMPERATURE: 97.4 F | HEART RATE: 90 BPM

## 2019-01-12 DIAGNOSIS — S16.1XXA STRAIN OF NECK MUSCLE, INITIAL ENCOUNTER: ICD-10-CM

## 2019-01-12 DIAGNOSIS — E04.1 THYROID NODULE: ICD-10-CM

## 2019-01-12 DIAGNOSIS — S39.012A STRAIN OF LUMBAR REGION, INITIAL ENCOUNTER: ICD-10-CM
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
## 2019-01-12 PROCEDURE — 72125 CT NECK SPINE W/O DYE: CPT

## 2019-01-12 PROCEDURE — 99284 EMERGENCY DEPT VISIT MOD MDM: CPT

## 2019-01-12 PROCEDURE — 72100 X-RAY EXAM L-S SPINE 2/3 VWS: CPT

## 2019-01-12 RX ORDER — IBUPROFEN 600 MG/1
600 TABLET ORAL 4 TIMES DAILY PRN
Qty: 30 TABLET | Refills: 0 | Status: SHIPPED | OUTPATIENT
Start: 2019-01-12 | End: 2019-08-14 | Stop reason: SDUPTHER

## 2019-01-12 ASSESSMENT — PAIN DESCRIPTION - PAIN TYPE: TYPE: ACUTE PAIN

## 2019-01-12 ASSESSMENT — PAIN DESCRIPTION - LOCATION: LOCATION: BACK;NECK

## 2019-01-12 ASSESSMENT — ENCOUNTER SYMPTOMS
SHORTNESS OF BREATH: 0
ABDOMINAL PAIN: 0
BACK PAIN: 1

## 2019-01-12 ASSESSMENT — PAIN SCALES - GENERAL: PAINLEVEL_OUTOF10: 4

## 2019-02-24 ENCOUNTER — APPOINTMENT (OUTPATIENT)
Dept: CT IMAGING | Age: 54
End: 2019-02-24
Payer: COMMERCIAL

## 2019-02-24 ENCOUNTER — HOSPITAL ENCOUNTER (EMERGENCY)
Age: 54
Discharge: HOME | End: 2019-02-25
Attending: EMERGENCY MEDICINE
Payer: COMMERCIAL

## 2019-02-24 DIAGNOSIS — N23 URETERIC COLIC: Primary | ICD-10-CM

## 2019-02-24 DIAGNOSIS — N20.1 CALCULUS OF DISTAL LEFT URETER: ICD-10-CM

## 2019-02-24 LAB
ABSOLUTE EOS #: 0.15 K/UL (ref 0–0.44)
ABSOLUTE IMMATURE GRANULOCYTE: <0.03 K/UL (ref 0–0.3)
ABSOLUTE LYMPH #: 1.5 K/UL (ref 1.1–3.7)
ABSOLUTE MONO #: 0.47 K/UL (ref 0.1–1.2)
ANION GAP SERPL CALCULATED.3IONS-SCNC: 17 MMOL/L (ref 9–17)
BASOPHILS # BLD: 0 % (ref 0–2)
BASOPHILS ABSOLUTE: <0.03 K/UL (ref 0–0.2)
BUN BLDV-MCNC: 28 MG/DL (ref 6–20)
BUN/CREAT BLD: 22 (ref 9–20)
CALCIUM SERPL-MCNC: 9.7 MG/DL (ref 8.6–10.4)
CHLORIDE BLD-SCNC: 99 MMOL/L (ref 98–107)
CO2: 22 MMOL/L (ref 20–31)
CREAT SERPL-MCNC: 1.26 MG/DL (ref 0.5–0.9)
DIFFERENTIAL TYPE: ABNORMAL
EOSINOPHILS RELATIVE PERCENT: 2 % (ref 1–4)
GFR AFRICAN AMERICAN: 54 ML/MIN
GFR NON-AFRICAN AMERICAN: 44 ML/MIN
GFR SERPL CREATININE-BSD FRML MDRD: ABNORMAL ML/MIN/{1.73_M2}
GFR SERPL CREATININE-BSD FRML MDRD: ABNORMAL ML/MIN/{1.73_M2}
GLUCOSE BLD-MCNC: 254 MG/DL
GLUCOSE BLD-MCNC: 254 MG/DL (ref 74–100)
GLUCOSE BLD-MCNC: 269 MG/DL (ref 70–99)
HCT VFR BLD CALC: 42.5 % (ref 36.3–47.1)
HEMOGLOBIN: 13.6 G/DL (ref 11.9–15.1)
IMMATURE GRANULOCYTES: 0 %
LYMPHOCYTES # BLD: 19 % (ref 24–43)
MCH RBC QN AUTO: 30 PG (ref 25.2–33.5)
MCHC RBC AUTO-ENTMCNC: 32 G/DL (ref 28.4–34.8)
MCV RBC AUTO: 93.8 FL (ref 82.6–102.9)
MONOCYTES # BLD: 6 % (ref 3–12)
NRBC AUTOMATED: 0 PER 100 WBC
PDW BLD-RTO: 13.7 % (ref 11.8–14.4)
PLATELET # BLD: 249 K/UL (ref 138–453)
PLATELET ESTIMATE: ABNORMAL
PMV BLD AUTO: 11 FL (ref 8.1–13.5)
POTASSIUM SERPL-SCNC: 4.7 MMOL/L (ref 3.7–5.3)
RBC # BLD: 4.53 M/UL (ref 3.95–5.11)
RBC # BLD: ABNORMAL 10*6/UL
SEG NEUTROPHILS: 73 % (ref 36–65)
SEGMENTED NEUTROPHILS ABSOLUTE COUNT: 5.79 K/UL (ref 1.5–8.1)
SODIUM BLD-SCNC: 138 MMOL/L (ref 135–144)
WBC # BLD: 7.9 K/UL (ref 3.5–11.3)
WBC # BLD: ABNORMAL 10*3/UL

## 2019-02-24 PROCEDURE — 85025 COMPLETE CBC W/AUTO DIFF WBC: CPT

## 2019-02-24 PROCEDURE — 2580000003 HC RX 258: Performed by: EMERGENCY MEDICINE

## 2019-02-24 PROCEDURE — 96374 THER/PROPH/DIAG INJ IV PUSH: CPT

## 2019-02-24 PROCEDURE — 6360000002 HC RX W HCPCS: Performed by: EMERGENCY MEDICINE

## 2019-02-24 PROCEDURE — 99284 EMERGENCY DEPT VISIT MOD MDM: CPT

## 2019-02-24 PROCEDURE — 82947 ASSAY GLUCOSE BLOOD QUANT: CPT

## 2019-02-24 PROCEDURE — 80048 BASIC METABOLIC PNL TOTAL CA: CPT

## 2019-02-24 PROCEDURE — 96375 TX/PRO/DX INJ NEW DRUG ADDON: CPT

## 2019-02-24 PROCEDURE — 96361 HYDRATE IV INFUSION ADD-ON: CPT

## 2019-02-24 RX ORDER — MORPHINE SULFATE 4 MG/ML
4 INJECTION, SOLUTION INTRAMUSCULAR; INTRAVENOUS ONCE
Status: COMPLETED | OUTPATIENT
Start: 2019-02-24 | End: 2019-02-24

## 2019-02-24 RX ORDER — ONDANSETRON 2 MG/ML
4 INJECTION INTRAMUSCULAR; INTRAVENOUS ONCE
Status: COMPLETED | OUTPATIENT
Start: 2019-02-24 | End: 2019-02-24

## 2019-02-24 RX ORDER — 0.9 % SODIUM CHLORIDE 0.9 %
1000 INTRAVENOUS SOLUTION INTRAVENOUS ONCE
Status: COMPLETED | OUTPATIENT
Start: 2019-02-24 | End: 2019-02-25

## 2019-02-24 RX ADMIN — MORPHINE SULFATE 4 MG: 4 INJECTION INTRAVENOUS at 23:40

## 2019-02-24 RX ADMIN — ONDANSETRON 4 MG: 2 INJECTION INTRAMUSCULAR; INTRAVENOUS at 23:40

## 2019-02-24 RX ADMIN — SODIUM CHLORIDE 1000 ML: 9 INJECTION, SOLUTION INTRAVENOUS at 23:40

## 2019-02-24 ASSESSMENT — PAIN SCALES - GENERAL
PAINLEVEL_OUTOF10: 10
PAINLEVEL_OUTOF10: 10

## 2019-02-24 ASSESSMENT — PAIN DESCRIPTION - PAIN TYPE: TYPE: ACUTE PAIN

## 2019-02-24 ASSESSMENT — PAIN DESCRIPTION - LOCATION: LOCATION: ABDOMEN

## 2019-02-24 ASSESSMENT — PAIN DESCRIPTION - DESCRIPTORS: DESCRIPTORS: CRAMPING

## 2019-02-25 ENCOUNTER — APPOINTMENT (OUTPATIENT)
Dept: CT IMAGING | Age: 54
End: 2019-02-25
Payer: COMMERCIAL

## 2019-02-25 VITALS
OXYGEN SATURATION: 98 % | SYSTOLIC BLOOD PRESSURE: 118 MMHG | RESPIRATION RATE: 21 BRPM | HEART RATE: 78 BPM | TEMPERATURE: 98 F | DIASTOLIC BLOOD PRESSURE: 73 MMHG

## 2019-02-25 LAB
-: ABNORMAL
AMORPHOUS: ABNORMAL
BACTERIA: ABNORMAL
BILIRUBIN URINE: NEGATIVE
CASTS UA: ABNORMAL /LPF
COLOR: YELLOW
COMMENT UA: ABNORMAL
CRYSTALS, UA: ABNORMAL /HPF
EPITHELIAL CELLS UA: ABNORMAL /HPF (ref 0–25)
GLUCOSE URINE: ABNORMAL
KETONES, URINE: NEGATIVE
LEUKOCYTE ESTERASE, URINE: NEGATIVE
MUCUS: ABNORMAL
NITRITE, URINE: NEGATIVE
OTHER OBSERVATIONS UA: ABNORMAL
PH UA: 6 (ref 5–9)
PROTEIN UA: ABNORMAL
RBC UA: ABNORMAL /HPF (ref 0–2)
RENAL EPITHELIAL, UA: ABNORMAL /HPF
SPECIFIC GRAVITY UA: 1.01 (ref 1.01–1.02)
TRICHOMONAS: ABNORMAL
TURBIDITY: CLEAR
URINE HGB: ABNORMAL
UROBILINOGEN, URINE: NORMAL
WBC UA: ABNORMAL /HPF (ref 0–5)
YEAST: ABNORMAL

## 2019-02-25 PROCEDURE — 6360000002 HC RX W HCPCS: Performed by: EMERGENCY MEDICINE

## 2019-02-25 PROCEDURE — 74176 CT ABD & PELVIS W/O CONTRAST: CPT

## 2019-02-25 PROCEDURE — 81001 URINALYSIS AUTO W/SCOPE: CPT

## 2019-02-25 PROCEDURE — 96376 TX/PRO/DX INJ SAME DRUG ADON: CPT

## 2019-02-25 PROCEDURE — 6370000000 HC RX 637 (ALT 250 FOR IP): Performed by: EMERGENCY MEDICINE

## 2019-02-25 RX ORDER — TAMSULOSIN HYDROCHLORIDE 0.4 MG/1
0.4 CAPSULE ORAL NIGHTLY
Qty: 5 CAPSULE | Refills: 0 | Status: SHIPPED | OUTPATIENT
Start: 2019-02-25 | End: 2019-03-01 | Stop reason: SDUPTHER

## 2019-02-25 RX ORDER — TAMSULOSIN HYDROCHLORIDE 0.4 MG/1
0.4 CAPSULE ORAL ONCE
Status: COMPLETED | OUTPATIENT
Start: 2019-02-25 | End: 2019-02-25

## 2019-02-25 RX ORDER — MORPHINE SULFATE 4 MG/ML
4 INJECTION, SOLUTION INTRAMUSCULAR; INTRAVENOUS ONCE
Status: COMPLETED | OUTPATIENT
Start: 2019-02-25 | End: 2019-02-25

## 2019-02-25 RX ORDER — HYDROCODONE BITARTRATE AND ACETAMINOPHEN 5; 325 MG/1; MG/1
1 TABLET ORAL EVERY 6 HOURS PRN
Qty: 20 TABLET | Refills: 0 | Status: SHIPPED | OUTPATIENT
Start: 2019-02-25 | End: 2019-08-14 | Stop reason: ALTCHOICE

## 2019-02-25 RX ORDER — CEPHALEXIN 500 MG/1
500 CAPSULE ORAL 2 TIMES DAILY
Qty: 14 CAPSULE | Refills: 0 | Status: SHIPPED | OUTPATIENT
Start: 2019-02-25 | End: 2019-08-14 | Stop reason: ALTCHOICE

## 2019-02-25 RX ORDER — ONDANSETRON 4 MG/1
4 TABLET, FILM COATED ORAL EVERY 6 HOURS PRN
Qty: 10 TABLET | Refills: 0 | Status: SHIPPED | OUTPATIENT
Start: 2019-02-25 | End: 2020-07-27 | Stop reason: ALTCHOICE

## 2019-02-25 RX ADMIN — MORPHINE SULFATE 4 MG: 4 INJECTION INTRAVENOUS at 01:22

## 2019-02-25 RX ADMIN — TAMSULOSIN HYDROCHLORIDE 0.4 MG: 0.4 CAPSULE ORAL at 01:22

## 2019-02-25 RX ADMIN — MORPHINE SULFATE 4 MG: 4 INJECTION INTRAVENOUS at 00:45

## 2019-02-25 ASSESSMENT — PAIN SCALES - GENERAL
PAINLEVEL_OUTOF10: 6
PAINLEVEL_OUTOF10: 8
PAINLEVEL_OUTOF10: 8
PAINLEVEL_OUTOF10: 4

## 2019-02-27 ENCOUNTER — HOSPITAL ENCOUNTER (OUTPATIENT)
Age: 54
Discharge: HOME | End: 2019-02-27
Payer: COMMERCIAL

## 2019-02-27 ENCOUNTER — OFFICE VISIT (OUTPATIENT)
Dept: UROLOGY | Age: 54
End: 2019-02-27
Payer: COMMERCIAL

## 2019-02-27 VITALS
DIASTOLIC BLOOD PRESSURE: 89 MMHG | BODY MASS INDEX: 43.18 KG/M2 | SYSTOLIC BLOOD PRESSURE: 142 MMHG | HEART RATE: 83 BPM | WEIGHT: 284 LBS

## 2019-02-27 DIAGNOSIS — N20.1 URETERAL CALCULUS: Primary | ICD-10-CM

## 2019-02-27 DIAGNOSIS — N23 RENAL COLIC ON LEFT SIDE: ICD-10-CM

## 2019-02-27 LAB
EKG ATRIAL RATE: 85 BPM
EKG P AXIS: 54 DEGREES
EKG P-R INTERVAL: 162 MS
EKG Q-T INTERVAL: 360 MS
EKG QRS DURATION: 102 MS
EKG QTC CALCULATION (BAZETT): 428 MS
EKG R AXIS: -22 DEGREES
EKG T AXIS: 42 DEGREES
EKG VENTRICULAR RATE: 85 BPM

## 2019-02-27 PROCEDURE — 99205 OFFICE O/P NEW HI 60 MIN: CPT | Performed by: UROLOGY

## 2019-02-27 PROCEDURE — 93005 ELECTROCARDIOGRAM TRACING: CPT

## 2019-02-27 ASSESSMENT — ENCOUNTER SYMPTOMS
NAUSEA: 0
BACK PAIN: 0
VOMITING: 0
EYE REDNESS: 0
COLOR CHANGE: 0
SHORTNESS OF BREATH: 0
WHEEZING: 0
COUGH: 0
EYE PAIN: 0
ABDOMINAL PAIN: 0

## 2019-03-01 ENCOUNTER — TELEPHONE (OUTPATIENT)
Dept: UROLOGY | Age: 54
End: 2019-03-01

## 2019-03-01 RX ORDER — TAMSULOSIN HYDROCHLORIDE 0.4 MG/1
0.4 CAPSULE ORAL NIGHTLY
Qty: 30 CAPSULE | Refills: 0 | Status: SHIPPED | OUTPATIENT
Start: 2019-03-01 | End: 2019-03-29 | Stop reason: SDUPTHER

## 2019-03-04 ENCOUNTER — ANESTHESIA EVENT (OUTPATIENT)
Dept: OPERATING ROOM | Age: 54
End: 2019-03-04
Payer: COMMERCIAL

## 2019-03-04 PROBLEM — N20.1 URETERAL CALCULUS: Status: ACTIVE | Noted: 2019-03-04

## 2019-03-05 ENCOUNTER — ANESTHESIA (OUTPATIENT)
Dept: OPERATING ROOM | Age: 54
End: 2019-03-05
Payer: COMMERCIAL

## 2019-03-05 ENCOUNTER — APPOINTMENT (OUTPATIENT)
Dept: GENERAL RADIOLOGY | Age: 54
End: 2019-03-05
Attending: UROLOGY
Payer: COMMERCIAL

## 2019-03-05 ENCOUNTER — HOSPITAL ENCOUNTER (OUTPATIENT)
Age: 54
Setting detail: OUTPATIENT SURGERY
Discharge: HOME | End: 2019-03-05
Attending: UROLOGY | Admitting: UROLOGY
Payer: COMMERCIAL

## 2019-03-05 VITALS
HEART RATE: 69 BPM | TEMPERATURE: 97.5 F | OXYGEN SATURATION: 90 % | RESPIRATION RATE: 16 BRPM | SYSTOLIC BLOOD PRESSURE: 119 MMHG | HEIGHT: 68 IN | DIASTOLIC BLOOD PRESSURE: 66 MMHG | WEIGHT: 280 LBS | BODY MASS INDEX: 42.44 KG/M2

## 2019-03-05 VITALS
OXYGEN SATURATION: 98 % | TEMPERATURE: 103.7 F | RESPIRATION RATE: 1 BRPM | DIASTOLIC BLOOD PRESSURE: 73 MMHG | SYSTOLIC BLOOD PRESSURE: 116 MMHG

## 2019-03-05 DIAGNOSIS — N20.1 CALCULUS OF URETER: ICD-10-CM

## 2019-03-05 PROCEDURE — 3600000004 HC SURGERY LEVEL 4 BASE: Performed by: UROLOGY

## 2019-03-05 PROCEDURE — 2709999900 HC NON-CHARGEABLE SUPPLY: Performed by: UROLOGY

## 2019-03-05 PROCEDURE — C2617 STENT, NON-COR, TEM W/O DEL: HCPCS | Performed by: UROLOGY

## 2019-03-05 PROCEDURE — C1769 GUIDE WIRE: HCPCS | Performed by: UROLOGY

## 2019-03-05 PROCEDURE — 6360000002 HC RX W HCPCS: Performed by: NURSE ANESTHETIST, CERTIFIED REGISTERED

## 2019-03-05 PROCEDURE — 7100000001 HC PACU RECOVERY - ADDTL 15 MIN: Performed by: UROLOGY

## 2019-03-05 PROCEDURE — 3600000014 HC SURGERY LEVEL 4 ADDTL 15MIN: Performed by: UROLOGY

## 2019-03-05 PROCEDURE — 6370000000 HC RX 637 (ALT 250 FOR IP): Performed by: UROLOGY

## 2019-03-05 PROCEDURE — 74018 RADEX ABDOMEN 1 VIEW: CPT

## 2019-03-05 PROCEDURE — 7100000000 HC PACU RECOVERY - FIRST 15 MIN: Performed by: UROLOGY

## 2019-03-05 PROCEDURE — 3700000001 HC ADD 15 MINUTES (ANESTHESIA): Performed by: UROLOGY

## 2019-03-05 PROCEDURE — 3700000000 HC ANESTHESIA ATTENDED CARE: Performed by: UROLOGY

## 2019-03-05 PROCEDURE — 2580000003 HC RX 258: Performed by: UROLOGY

## 2019-03-05 PROCEDURE — 2720000010 HC SURG SUPPLY STERILE: Performed by: UROLOGY

## 2019-03-05 PROCEDURE — C1773 RET DEV, INSERTABLE: HCPCS | Performed by: UROLOGY

## 2019-03-05 PROCEDURE — 7100000010 HC PHASE II RECOVERY - FIRST 15 MIN: Performed by: UROLOGY

## 2019-03-05 PROCEDURE — 2500000003 HC RX 250 WO HCPCS: Performed by: NURSE ANESTHETIST, CERTIFIED REGISTERED

## 2019-03-05 PROCEDURE — 6360000002 HC RX W HCPCS: Performed by: UROLOGY

## 2019-03-05 PROCEDURE — 7100000011 HC PHASE II RECOVERY - ADDTL 15 MIN: Performed by: UROLOGY

## 2019-03-05 DEVICE — URETERAL STENT
Type: IMPLANTABLE DEVICE | Status: FUNCTIONAL
Brand: PERCUFLEX™ PLUS

## 2019-03-05 RX ORDER — DIMENHYDRINATE 50 MG/1
50 TABLET ORAL ONCE
Status: COMPLETED | OUTPATIENT
Start: 2019-03-05 | End: 2019-03-05

## 2019-03-05 RX ORDER — ONDANSETRON 2 MG/ML
4 INJECTION INTRAMUSCULAR; INTRAVENOUS
Status: DISCONTINUED | OUTPATIENT
Start: 2019-03-05 | End: 2019-03-05 | Stop reason: HOSPADM

## 2019-03-05 RX ORDER — ACETAMINOPHEN 325 MG/1
650 TABLET ORAL ONCE
Status: COMPLETED | OUTPATIENT
Start: 2019-03-05 | End: 2019-03-05

## 2019-03-05 RX ORDER — HYDROCODONE BITARTRATE AND ACETAMINOPHEN 5; 325 MG/1; MG/1
2 TABLET ORAL PRN
Status: DISCONTINUED | OUTPATIENT
Start: 2019-03-05 | End: 2019-03-05 | Stop reason: HOSPADM

## 2019-03-05 RX ORDER — KETOROLAC TROMETHAMINE 30 MG/ML
INJECTION, SOLUTION INTRAMUSCULAR; INTRAVENOUS PRN
Status: DISCONTINUED | OUTPATIENT
Start: 2019-03-05 | End: 2019-03-05 | Stop reason: SDUPTHER

## 2019-03-05 RX ORDER — SODIUM CHLORIDE, SODIUM LACTATE, POTASSIUM CHLORIDE, CALCIUM CHLORIDE 600; 310; 30; 20 MG/100ML; MG/100ML; MG/100ML; MG/100ML
INJECTION, SOLUTION INTRAVENOUS CONTINUOUS
Status: DISCONTINUED | OUTPATIENT
Start: 2019-03-05 | End: 2019-03-05 | Stop reason: HOSPADM

## 2019-03-05 RX ORDER — FENTANYL CITRATE 50 UG/ML
50 INJECTION, SOLUTION INTRAMUSCULAR; INTRAVENOUS EVERY 5 MIN PRN
Status: DISCONTINUED | OUTPATIENT
Start: 2019-03-05 | End: 2019-03-05 | Stop reason: HOSPADM

## 2019-03-05 RX ORDER — FENTANYL CITRATE 50 UG/ML
INJECTION, SOLUTION INTRAMUSCULAR; INTRAVENOUS PRN
Status: DISCONTINUED | OUTPATIENT
Start: 2019-03-05 | End: 2019-03-05 | Stop reason: SDUPTHER

## 2019-03-05 RX ORDER — FENTANYL CITRATE 50 UG/ML
25 INJECTION, SOLUTION INTRAMUSCULAR; INTRAVENOUS EVERY 5 MIN PRN
Status: DISCONTINUED | OUTPATIENT
Start: 2019-03-05 | End: 2019-03-05 | Stop reason: HOSPADM

## 2019-03-05 RX ORDER — ONDANSETRON 2 MG/ML
INJECTION INTRAMUSCULAR; INTRAVENOUS PRN
Status: DISCONTINUED | OUTPATIENT
Start: 2019-03-05 | End: 2019-03-05 | Stop reason: SDUPTHER

## 2019-03-05 RX ORDER — LIDOCAINE HYDROCHLORIDE 20 MG/ML
INJECTION, SOLUTION INFILTRATION; PERINEURAL PRN
Status: DISCONTINUED | OUTPATIENT
Start: 2019-03-05 | End: 2019-03-05 | Stop reason: SDUPTHER

## 2019-03-05 RX ORDER — DEXAMETHASONE SODIUM PHOSPHATE 4 MG/ML
INJECTION, SOLUTION INTRA-ARTICULAR; INTRALESIONAL; INTRAMUSCULAR; INTRAVENOUS; SOFT TISSUE PRN
Status: DISCONTINUED | OUTPATIENT
Start: 2019-03-05 | End: 2019-03-05 | Stop reason: SDUPTHER

## 2019-03-05 RX ORDER — CIPROFLOXACIN 2 MG/ML
400 INJECTION, SOLUTION INTRAVENOUS
Status: COMPLETED | OUTPATIENT
Start: 2019-03-05 | End: 2019-03-05

## 2019-03-05 RX ORDER — SODIUM CHLORIDE 0.9 % (FLUSH) 0.9 %
10 SYRINGE (ML) INJECTION EVERY 12 HOURS SCHEDULED
Status: DISCONTINUED | OUTPATIENT
Start: 2019-03-05 | End: 2019-03-05 | Stop reason: HOSPADM

## 2019-03-05 RX ORDER — SODIUM CHLORIDE 0.9 % (FLUSH) 0.9 %
10 SYRINGE (ML) INJECTION PRN
Status: DISCONTINUED | OUTPATIENT
Start: 2019-03-05 | End: 2019-03-05 | Stop reason: HOSPADM

## 2019-03-05 RX ORDER — HYDROCODONE BITARTRATE AND ACETAMINOPHEN 5; 325 MG/1; MG/1
1 TABLET ORAL PRN
Status: DISCONTINUED | OUTPATIENT
Start: 2019-03-05 | End: 2019-03-05 | Stop reason: HOSPADM

## 2019-03-05 RX ORDER — PROPOFOL 10 MG/ML
INJECTION, EMULSION INTRAVENOUS PRN
Status: DISCONTINUED | OUTPATIENT
Start: 2019-03-05 | End: 2019-03-05 | Stop reason: SDUPTHER

## 2019-03-05 RX ORDER — SUCCINYLCHOLINE CHLORIDE 20 MG/ML
INJECTION INTRAMUSCULAR; INTRAVENOUS PRN
Status: DISCONTINUED | OUTPATIENT
Start: 2019-03-05 | End: 2019-03-05 | Stop reason: SDUPTHER

## 2019-03-05 RX ADMIN — DEXAMETHASONE SODIUM PHOSPHATE 4 MG: 4 INJECTION, SOLUTION INTRAMUSCULAR; INTRAVENOUS at 13:45

## 2019-03-05 RX ADMIN — ACETAMINOPHEN 650 MG: 325 TABLET, FILM COATED ORAL at 12:47

## 2019-03-05 RX ADMIN — CIPROFLOXACIN 400 MG: 2 INJECTION, SOLUTION INTRAVENOUS at 13:35

## 2019-03-05 RX ADMIN — SUCCINYLCHOLINE CHLORIDE 100 MG: 20 INJECTION, SOLUTION INTRAMUSCULAR; INTRAVENOUS at 13:55

## 2019-03-05 RX ADMIN — FENTANYL CITRATE 50 MCG: 50 INJECTION INTRAMUSCULAR; INTRAVENOUS at 13:39

## 2019-03-05 RX ADMIN — KETOROLAC TROMETHAMINE 30 MG: 30 INJECTION, SOLUTION INTRAMUSCULAR; INTRAVENOUS at 14:15

## 2019-03-05 RX ADMIN — DIMENHYDRINATE 50 MG: 50 TABLET ORAL at 12:47

## 2019-03-05 RX ADMIN — PROPOFOL 200 MG: 10 INJECTION, EMULSION INTRAVENOUS at 13:55

## 2019-03-05 RX ADMIN — SODIUM CHLORIDE, POTASSIUM CHLORIDE, SODIUM LACTATE AND CALCIUM CHLORIDE: 600; 310; 30; 20 INJECTION, SOLUTION INTRAVENOUS at 12:59

## 2019-03-05 RX ADMIN — ONDANSETRON 4 MG: 2 INJECTION INTRAMUSCULAR; INTRAVENOUS at 13:39

## 2019-03-05 RX ADMIN — FENTANYL CITRATE 50 MCG: 50 INJECTION INTRAMUSCULAR; INTRAVENOUS at 13:58

## 2019-03-05 RX ADMIN — PROPOFOL 200 MG: 10 INJECTION, EMULSION INTRAVENOUS at 13:43

## 2019-03-05 RX ADMIN — LIDOCAINE HYDROCHLORIDE 100 MG: 20 INJECTION, SOLUTION INFILTRATION; PERINEURAL at 13:43

## 2019-03-05 ASSESSMENT — PAIN SCALES - GENERAL
PAINLEVEL_OUTOF10: 2
PAINLEVEL_OUTOF10: 0

## 2019-03-05 ASSESSMENT — PULMONARY FUNCTION TESTS
PIF_VALUE: 37
PIF_VALUE: 16
PIF_VALUE: 1
PIF_VALUE: 18
PIF_VALUE: 30
PIF_VALUE: 13
PIF_VALUE: 26
PIF_VALUE: 7
PIF_VALUE: 30
PIF_VALUE: 0
PIF_VALUE: 16
PIF_VALUE: 2
PIF_VALUE: 18
PIF_VALUE: 21
PIF_VALUE: 34
PIF_VALUE: 33
PIF_VALUE: 4
PIF_VALUE: 28
PIF_VALUE: 1
PIF_VALUE: 26
PIF_VALUE: 28
PIF_VALUE: 5
PIF_VALUE: 23
PIF_VALUE: 0
PIF_VALUE: 1
PIF_VALUE: 4
PIF_VALUE: 15
PIF_VALUE: 2
PIF_VALUE: 22
PIF_VALUE: 2
PIF_VALUE: 13
PIF_VALUE: 33
PIF_VALUE: 21
PIF_VALUE: 14
PIF_VALUE: 23
PIF_VALUE: 15
PIF_VALUE: 0
PIF_VALUE: 22
PIF_VALUE: 29
PIF_VALUE: 24
PIF_VALUE: 5
PIF_VALUE: 42
PIF_VALUE: 22
PIF_VALUE: 31
PIF_VALUE: 15
PIF_VALUE: 19

## 2019-03-05 ASSESSMENT — PAIN DESCRIPTION - DESCRIPTORS: DESCRIPTORS: STABBING

## 2019-03-05 ASSESSMENT — ENCOUNTER SYMPTOMS: DYSPNEA ACTIVITY LEVEL: AFTER AMBULATING 1 FLIGHT OF STAIRS

## 2019-03-05 ASSESSMENT — PAIN - FUNCTIONAL ASSESSMENT: PAIN_FUNCTIONAL_ASSESSMENT: 0-10

## 2019-03-05 ASSESSMENT — LIFESTYLE VARIABLES: SMOKING_STATUS: 0

## 2019-03-07 ENCOUNTER — OFFICE VISIT (OUTPATIENT)
Dept: UROLOGY | Age: 54
End: 2019-03-07
Payer: COMMERCIAL

## 2019-03-07 VITALS
TEMPERATURE: 98.4 F | SYSTOLIC BLOOD PRESSURE: 130 MMHG | DIASTOLIC BLOOD PRESSURE: 88 MMHG | WEIGHT: 281 LBS | BODY MASS INDEX: 42.59 KG/M2 | HEIGHT: 68 IN

## 2019-03-07 DIAGNOSIS — N20.1 URETERAL CALCULUS: Primary | ICD-10-CM

## 2019-03-07 PROCEDURE — 99213 OFFICE O/P EST LOW 20 MIN: CPT | Performed by: NURSE PRACTITIONER

## 2019-03-07 ASSESSMENT — ENCOUNTER SYMPTOMS
EYE REDNESS: 0
BACK PAIN: 0
ABDOMINAL PAIN: 0
COLOR CHANGE: 0
CONSTIPATION: 0
VOMITING: 0
NAUSEA: 0
SHORTNESS OF BREATH: 0
EYE PAIN: 0
COUGH: 0
WHEEZING: 0

## 2019-03-11 ENCOUNTER — TELEPHONE (OUTPATIENT)
Dept: UROLOGY | Age: 54
End: 2019-03-11

## 2019-03-11 ENCOUNTER — HOSPITAL ENCOUNTER (OUTPATIENT)
Age: 54
Discharge: HOME | End: 2019-03-11
Payer: COMMERCIAL

## 2019-03-11 DIAGNOSIS — R35.0 FREQUENCY OF URINATION: ICD-10-CM

## 2019-03-11 DIAGNOSIS — R35.0 FREQUENCY OF URINATION: Primary | ICD-10-CM

## 2019-03-11 LAB
-: ABNORMAL
AMORPHOUS: ABNORMAL
BACTERIA: ABNORMAL
BILIRUBIN URINE: NEGATIVE
CASTS UA: ABNORMAL /LPF
COLOR: YELLOW
COMMENT UA: ABNORMAL
CRYSTALS, UA: ABNORMAL /HPF
EPITHELIAL CELLS UA: ABNORMAL /HPF (ref 0–25)
GLUCOSE URINE: ABNORMAL
KETONES, URINE: NEGATIVE
LEUKOCYTE ESTERASE, URINE: NEGATIVE
MUCUS: ABNORMAL
NITRITE, URINE: NEGATIVE
OTHER OBSERVATIONS UA: ABNORMAL
PH UA: 6 (ref 5–9)
PROTEIN UA: NEGATIVE
RBC UA: ABNORMAL /HPF (ref 0–2)
RENAL EPITHELIAL, UA: ABNORMAL /HPF
SPECIFIC GRAVITY UA: 1.01 (ref 1.01–1.02)
TRICHOMONAS: ABNORMAL
TURBIDITY: CLEAR
URINE HGB: ABNORMAL
UROBILINOGEN, URINE: NORMAL
WBC UA: ABNORMAL /HPF (ref 0–5)
YEAST: ABNORMAL

## 2019-03-11 PROCEDURE — 81001 URINALYSIS AUTO W/SCOPE: CPT

## 2019-03-11 PROCEDURE — 87086 URINE CULTURE/COLONY COUNT: CPT

## 2019-03-11 RX ORDER — CEPHALEXIN 500 MG/1
500 CAPSULE ORAL 2 TIMES DAILY
Qty: 14 CAPSULE | Refills: 0 | Status: SHIPPED | OUTPATIENT
Start: 2019-03-11 | End: 2019-08-14 | Stop reason: ALTCHOICE

## 2019-03-12 LAB
CULTURE: NORMAL
Lab: NORMAL
SPECIMEN DESCRIPTION: NORMAL

## 2019-03-13 ENCOUNTER — TELEPHONE (OUTPATIENT)
Dept: UROLOGY | Age: 54
End: 2019-03-13

## 2019-03-29 RX ORDER — TAMSULOSIN HYDROCHLORIDE 0.4 MG/1
CAPSULE ORAL
Qty: 30 CAPSULE | Refills: 0 | Status: SHIPPED | OUTPATIENT
Start: 2019-03-29 | End: 2020-07-27 | Stop reason: ALTCHOICE

## 2019-04-10 ENCOUNTER — HOSPITAL ENCOUNTER (OUTPATIENT)
Age: 54
Discharge: HOME | End: 2019-04-10
Payer: COMMERCIAL

## 2019-04-10 DIAGNOSIS — N20.1 URETERAL CALCULUS: ICD-10-CM

## 2019-04-10 DIAGNOSIS — N23 RENAL COLIC ON LEFT SIDE: ICD-10-CM

## 2019-04-10 LAB
ANION GAP SERPL CALCULATED.3IONS-SCNC: 10 MMOL/L (ref 9–17)
BUN BLDV-MCNC: 19 MG/DL (ref 6–20)
BUN/CREAT BLD: 20 (ref 9–20)
CALCIUM SERPL-MCNC: 9.4 MG/DL (ref 8.6–10.4)
CHLORIDE BLD-SCNC: 101 MMOL/L (ref 98–107)
CO2: 28 MMOL/L (ref 20–31)
CREAT SERPL-MCNC: 0.97 MG/DL (ref 0.5–0.9)
GFR AFRICAN AMERICAN: >60 ML/MIN
GFR NON-AFRICAN AMERICAN: 60 ML/MIN
GFR SERPL CREATININE-BSD FRML MDRD: ABNORMAL ML/MIN/{1.73_M2}
GFR SERPL CREATININE-BSD FRML MDRD: ABNORMAL ML/MIN/{1.73_M2}
GLUCOSE BLD-MCNC: 170 MG/DL (ref 70–99)
POTASSIUM SERPL-SCNC: 4.8 MMOL/L (ref 3.7–5.3)
SODIUM BLD-SCNC: 139 MMOL/L (ref 135–144)

## 2019-04-10 PROCEDURE — 80048 BASIC METABOLIC PNL TOTAL CA: CPT

## 2019-04-10 PROCEDURE — 36415 COLL VENOUS BLD VENIPUNCTURE: CPT

## 2019-04-12 ENCOUNTER — HOSPITAL ENCOUNTER (OUTPATIENT)
Age: 54
Discharge: HOME | End: 2019-04-14
Payer: COMMERCIAL

## 2019-04-12 ENCOUNTER — HOSPITAL ENCOUNTER (OUTPATIENT)
Dept: GENERAL RADIOLOGY | Age: 54
Discharge: HOME | End: 2019-04-14
Payer: COMMERCIAL

## 2019-04-12 DIAGNOSIS — N20.1 URETERAL CALCULUS: ICD-10-CM

## 2019-04-12 PROCEDURE — 74018 RADEX ABDOMEN 1 VIEW: CPT

## 2019-04-15 ENCOUNTER — OFFICE VISIT (OUTPATIENT)
Dept: UROLOGY | Age: 54
End: 2019-04-15
Payer: COMMERCIAL

## 2019-04-15 VITALS
DIASTOLIC BLOOD PRESSURE: 98 MMHG | TEMPERATURE: 97.5 F | SYSTOLIC BLOOD PRESSURE: 166 MMHG | WEIGHT: 280 LBS | HEIGHT: 68 IN | BODY MASS INDEX: 42.44 KG/M2

## 2019-04-15 DIAGNOSIS — N20.0 RENAL STONE: Primary | ICD-10-CM

## 2019-04-15 PROCEDURE — 99213 OFFICE O/P EST LOW 20 MIN: CPT | Performed by: NURSE PRACTITIONER

## 2019-04-15 ASSESSMENT — ENCOUNTER SYMPTOMS
CONSTIPATION: 0
SHORTNESS OF BREATH: 0
COLOR CHANGE: 0
VOMITING: 0
NAUSEA: 0
BACK PAIN: 0
EYE PAIN: 0
COUGH: 0
EYE REDNESS: 0
WHEEZING: 0
ABDOMINAL PAIN: 0

## 2019-04-15 NOTE — PROGRESS NOTES
HPI:    Patient is a 46 y.o. female in no acute distress. She is alert and oriented to person, place, and time. History  3/5/2019 Left HLL with basket extraction    Today  Here today for a 6 week follow-up s/p left HLL on 3/5/2019. She denies any episodes of gross hematuria, flank or abdominal pain. We did check a KUB prior to today's visit. This film was independently reviewed and shows no evidence of  calcifications. I also did repeat her renal function due to acute renal failure associated with ureteral obstruction. Renal function is stable: BUN 19, creatinine 0.97, GFR 60. Past Medical History:   Diagnosis Date    Endometriosis     Headache(784.0)     Hyperlipidemia     Hypertension     MRSA (methicillin resistant staph aureus) culture positive 01/31/2018    Obesity     Restless legs syndrome     Type 2 diabetes mellitus [facility])      Past Surgical History:   Procedure Laterality Date    APPENDECTOMY  1989    BUNIONECTOMY Left 08/26/2016   [address]; [address]    COLONOSCOPY  11/13/2017    Dr. Ezio Valdivia prep-repeat 3-5 years with 2 day prep)    CYSTOSCOPY Left 3/5/2019    CYSTOSCOPY URETEROSCOPY Diania Haw performed by Mike Montes MD at [address] [place] [address] / [facility] [employer] / STONE Left 3/5/2019    CYSTOSCOPY STENT INSERTION WITH BASKET EXTRACTION OF STONE TO BLADDER performed by Mike Montes MD at [address] [facility] Left 10/11/2016    Resection infected non-union Lt.  Great toe; insertion antibx bone cement plug    FOOT SURGERY Left 12/21/2016    total arthroplasty with implant big toe    HYSTERECTOMY, TOTAL ABDOMINAL  1991    KNEE ARTHROSCOPY Right 2017    [place] COLON CA SCRN NOT HI RSK IND N/A 11/13/2017    COLONOSCOPY performed by Michael Hawley MD at [address] OFFICE/OUTPT [address] [facility] Left 4/11/2018    FOOT BONE EXCISION-3RD TOE performed by Keny Poole DPM at [address]  [place] REMOVAL OF SINGLE TOE,EACH Right 1/31/2018    TOE AMPUTATION performed by Roger Mccollum DPM at [facility] [address] EXTEN/COMPLIC Right 5/1/4908    FOOT FLAP CLOSURE-DELAYED PRIMARY    AND DEBRIDEMENT OF THIRD TOE LEFT FOOT performed by Roger Mccollum DPM at [address] [facility] Right 01/31/2018    Dr Inessa Grimes- Right big toe   [address]  2011   Mendel Alamin Left      Outpatient Encounter Medications as of 4/15/2019   Medication Sig Dispense Refill    ondansetron (ZOFRAN) 4 MG tablet Take 1 tablet by mouth every 6 hours as needed for Nausea or Vomiting 10 tablet 0    DULoxetine (CYMBALTA) 60 MG extended release capsule Take 60 mg by mouth daily      ibuprofen (ADVIL;MOTRIN) 600 MG tablet Take 1 tablet by mouth 4 times daily as needed for Pain 30 tablet 0    losartan (COZAAR) 100 MG tablet Take 1 tablet by mouth daily 30 tablet 8    Handicap Placard MISC by Does not apply route Duration; 4 years 1 each 0    allopurinol (ZYLOPRIM) 100 MG tablet Take 1 tablet by mouth daily 90 tablet 3    aspirin 81 MG chewable tablet Take 81 mg by mouth daily      L-Methylfolate-Algae-B12-B6 (METANX PO) Take by mouth daily      MELATONIN PO Take 10 mg by mouth nightly       Insulin Glargine (TOUJEO SOLOSTAR) 300 UNIT/ML SOPN Inject 80 Units into the skin nightly  3 Pen     imipramine (TOFRANIL) 50 MG tablet Take 75 mg by mouth nightly       atorvastatin (LIPITOR) 80 MG tablet TAKE ONE TABLET BY MOUTH EVERY DAY (Patient taking differently: 40 mg TAKE ONE TABLET BY MOUTH EVERY DAY) 30 tablet 11    ONE TOUCH ULTRA TEST strip 1 each daily       LYRICA 300 MG capsule Take 300 mg by mouth nightly       metoprolol (TOPROL-XL) 50 MG XL tablet Take 50 mg by mouth 2 times daily       metFORMIN ER (GLUCOPHAGE-XR) 500 MG XR tablet Take 1,000 mg by mouth 2 times daily       CLICKFINE PEN NEEDLES 31G X 8 MM MISC 1 each daily       exenatide (BYETTA 10 MCG PEN) 10 MCG/0.04ML injection Inject 10 mcg into the skin 2 times daily (with meals)       glimepiride (AMARYL) 4 MG tablet Take 8 mg by mouth every morning (before breakfast)       tamsulosin (FLOMAX) 0.4 MG capsule TAKE ONE CAPSULE BY MOUTH ONCE NIGHTLY 30 capsule 0     No facility-administered encounter medications on file as of 4/15/2019.        Current Outpatient Medications on File Prior to Visit   Medication Sig Dispense Refill    ondansetron (ZOFRAN) 4 MG tablet Take 1 tablet by mouth every 6 hours as needed for Nausea or Vomiting 10 tablet 0    DULoxetine (CYMBALTA) 60 MG extended release capsule Take 60 mg by mouth daily      ibuprofen (ADVIL;MOTRIN) 600 MG tablet Take 1 tablet by mouth 4 times daily as needed for Pain 30 tablet 0    losartan (COZAAR) 100 MG tablet Take 1 tablet by mouth daily 30 tablet 8    Handicap Placard MISC by Does not apply route Duration; 4 years 1 each 0    allopurinol (ZYLOPRIM) 100 MG tablet Take 1 tablet by mouth daily 90 tablet 3    aspirin 81 MG chewable tablet Take 81 mg by mouth daily      L-Methylfolate-Algae-B12-B6 (METANX PO) Take by mouth daily      MELATONIN PO Take 10 mg by mouth nightly       Insulin Glargine (TOUJEO SOLOSTAR) 300 UNIT/ML SOPN Inject 80 Units into the skin nightly  3 Pen     imipramine (TOFRANIL) 50 MG tablet Take 75 mg by mouth nightly       atorvastatin (LIPITOR) 80 MG tablet TAKE ONE TABLET BY MOUTH EVERY DAY (Patient taking differently: 40 mg TAKE ONE TABLET BY MOUTH EVERY DAY) 30 tablet 11    ONE TOUCH ULTRA TEST strip 1 each daily       LYRICA 300 MG capsule Take 300 mg by mouth nightly       metoprolol (TOPROL-XL) 50 MG XL tablet Take 50 mg by mouth 2 times daily       metFORMIN ER (GLUCOPHAGE-XR) 500 MG XR tablet Take 1,000 mg by mouth 2 times daily       CLICKFINE PEN NEEDLES 31G X 8 MM MISC 1 each daily       exenatide (BYETTA 10 MCG PEN) 10 MCG/0.04ML injection Inject 10 mcg into the skin 2 times daily (with meals)       glimepiride (AMARYL) 4 MG tablet Take 8 mg by mouth every morning (before breakfast)       tamsulosin (FLOMAX) 0.4 MG capsule TAKE ONE CAPSULE BY MOUTH ONCE NIGHTLY 30 capsule 0     No current facility-administered medications on file prior to visit. Patient has no known allergies. Family History   Problem Relation Age of Onset    Allergies Mother     High Blood Pressure Mother     Heart Attack Mother     Arthritis Mother     Allergies Father     Heart Disease Father     Diabetes Father      Social History     Tobacco Use   Smoking Status Never Smoker   Smokeless Tobacco Never Used       Social History     Substance and Sexual Activity   Alcohol Use No    Alcohol/week: 0.0 oz       Review of Systems   Constitutional: Negative for appetite change, chills and fever. Eyes: Negative for pain, redness and visual disturbance. Respiratory: Negative for cough, shortness of breath and wheezing. Cardiovascular: Negative for chest pain and leg swelling. Gastrointestinal: Negative for abdominal pain, constipation, nausea and vomiting. Genitourinary: Negative for difficulty urinating, dysuria, flank pain, frequency, hematuria, pelvic pain, urgency, vaginal bleeding and vaginal discharge. Musculoskeletal: Negative for back pain, joint swelling and myalgias. Skin: Negative for color change, rash and wound. Neurological: Negative for dizziness, tremors and numbness. Hematological: Negative for adenopathy. Does not bruise/bleed easily. BP (!) 166/98 (Site: Right Upper Arm, Position: Sitting, Cuff Size: Large Adult)   Temp 97.5 °F (36.4 °C) (Oral)   Ht 5' 8\" (1.727 m)   Wt 280 lb (127 kg)   BMI 42.57 kg/m²       PHYSICAL EXAM:  Constitutional: Patient resting comfortably, in no acute distress. Neuro: Alert and oriented to person place and time. Cranial nerves grossly intact.     Psych: Mood and affect normal.  Skin: Warm, dry  HEENT: normocephalic, atraumatic  Lymphatics: No palpable lymphadenopathy  Lungs: Respiratory effort normal, unlabored  Cardiovascular:  Normal peripheral pulses  Abdomen: Soft, non-tender, non-distended with no organomegaly or palpable masses. : No CVA tenderness. Bladder non-tender and not distended. Pelvic: Deferred    Lab Results   Component Value Date    BUN 19 04/10/2019     Lab Results   Component Value Date    CREATININE 0.97 (H) 04/10/2019       ASSESSMENT:   Diagnosis Orders   1. Renal stone  XR ABDOMEN (KUB) (SINGLE AP VIEW)           PLAN:  To prevent future stone formation:  1) drink around 80 ounces of water per day  2) Avoid/minimize intake of \"bad fluids\" (soda pop, coffee, tea, alcohol, energy drinks)  3) reduce consumption of sodium/salt  4) reduce consumption of fatty animal protein (full-fat cheese/milk/dairy, red meats, pork). Limit protein intake to low-fat/lean options (low-fat dairy, fish, chicken, turkey)    We will see her on an annual basis with a KUB prior or sooner if needed for new or worsening symptoms.

## 2019-07-10 ENCOUNTER — HOSPITAL ENCOUNTER (OUTPATIENT)
Dept: GENERAL RADIOLOGY | Age: 54
Discharge: HOME | End: 2019-07-12
Payer: COMMERCIAL

## 2019-07-10 ENCOUNTER — HOSPITAL ENCOUNTER (OUTPATIENT)
Age: 54
Discharge: HOME | End: 2019-07-12
Payer: COMMERCIAL

## 2019-07-10 ENCOUNTER — HOSPITAL ENCOUNTER (OUTPATIENT)
Age: 54
Discharge: HOME | End: 2019-07-10
Payer: COMMERCIAL

## 2019-07-10 DIAGNOSIS — D47.2 MONOCLONAL GAMMOPATHY: ICD-10-CM

## 2019-07-10 PROCEDURE — 77075 RADEX OSSEOUS SURVEY COMPL: CPT

## 2019-07-12 ENCOUNTER — HOSPITAL ENCOUNTER (OUTPATIENT)
Age: 54
Discharge: HOME | End: 2019-07-12
Payer: COMMERCIAL

## 2019-07-12 LAB
ABSOLUTE EOS #: 0.21 K/UL (ref 0–0.44)
ABSOLUTE IMMATURE GRANULOCYTE: <0.03 K/UL (ref 0–0.3)
ABSOLUTE LYMPH #: 1.58 K/UL (ref 1.1–3.7)
ABSOLUTE MONO #: 0.52 K/UL (ref 0.1–1.2)
ALBUMIN SERPL-MCNC: 4 G/DL (ref 3.5–5.2)
ALBUMIN/GLOBULIN RATIO: 1.5 (ref 1–2.5)
ALP BLD-CCNC: 137 U/L (ref 35–104)
ALT SERPL-CCNC: 32 U/L (ref 5–33)
ANION GAP SERPL CALCULATED.3IONS-SCNC: 11 MMOL/L (ref 9–17)
AST SERPL-CCNC: 21 U/L
BASOPHILS # BLD: 0 % (ref 0–2)
BASOPHILS ABSOLUTE: <0.03 K/UL (ref 0–0.2)
BILIRUB SERPL-MCNC: 0.5 MG/DL (ref 0.3–1.2)
BUN BLDV-MCNC: 26 MG/DL (ref 6–20)
BUN/CREAT BLD: 23 (ref 9–20)
CALCIUM SERPL-MCNC: 9.4 MG/DL (ref 8.6–10.4)
CHLORIDE BLD-SCNC: 100 MMOL/L (ref 98–107)
CO2: 24 MMOL/L (ref 20–31)
CREAT SERPL-MCNC: 1.13 MG/DL (ref 0.5–0.9)
DIFFERENTIAL TYPE: NORMAL
EOSINOPHILS RELATIVE PERCENT: 4 % (ref 1–4)
FREE KAPPA/LAMBDA RATIO: 1.02 (ref 0.26–1.65)
GFR AFRICAN AMERICAN: >60 ML/MIN
GFR NON-AFRICAN AMERICAN: 50 ML/MIN
GFR SERPL CREATININE-BSD FRML MDRD: ABNORMAL ML/MIN/{1.73_M2}
GFR SERPL CREATININE-BSD FRML MDRD: ABNORMAL ML/MIN/{1.73_M2}
GLUCOSE BLD-MCNC: 160 MG/DL (ref 70–99)
HCT VFR BLD CALC: 38 % (ref 36.3–47.1)
HEMOGLOBIN: 12.1 G/DL (ref 11.9–15.1)
IGA: 144 MG/DL (ref 70–400)
IGG: 722 MG/DL (ref 700–1600)
IGM: <25 MG/DL (ref 40–230)
IMMATURE GRANULOCYTES: 0 %
KAPPA FREE LIGHT CHAINS QNT: 1.8 MG/DL (ref 0.37–1.94)
LAMBDA FREE LIGHT CHAINS QNT: 1.77 MG/DL (ref 0.57–2.63)
LYMPHOCYTES # BLD: 33 % (ref 24–43)
MCH RBC QN AUTO: 30.3 PG (ref 25.2–33.5)
MCHC RBC AUTO-ENTMCNC: 31.8 G/DL (ref 28.4–34.8)
MCV RBC AUTO: 95.2 FL (ref 82.6–102.9)
MONOCYTES # BLD: 11 % (ref 3–12)
NRBC AUTOMATED: 0 PER 100 WBC
PDW BLD-RTO: 13.8 % (ref 11.8–14.4)
PLATELET # BLD: 216 K/UL (ref 138–453)
PLATELET ESTIMATE: NORMAL
PMV BLD AUTO: 10.7 FL (ref 8.1–13.5)
POTASSIUM SERPL-SCNC: 4.9 MMOL/L (ref 3.7–5.3)
RBC # BLD: 3.99 M/UL (ref 3.95–5.11)
RBC # BLD: NORMAL 10*6/UL
SEG NEUTROPHILS: 52 % (ref 36–65)
SEGMENTED NEUTROPHILS ABSOLUTE COUNT: 2.52 K/UL (ref 1.5–8.1)
SODIUM BLD-SCNC: 135 MMOL/L (ref 135–144)
TOTAL PROTEIN: 6.6 G/DL (ref 6.4–8.3)
WBC # BLD: 4.8 K/UL (ref 3.5–11.3)
WBC # BLD: NORMAL 10*3/UL

## 2019-07-12 PROCEDURE — 86335 IMMUNFIX E-PHORSIS/URINE/CSF: CPT

## 2019-07-12 PROCEDURE — 84166 PROTEIN E-PHORESIS/URINE/CSF: CPT

## 2019-07-12 PROCEDURE — 84165 PROTEIN E-PHORESIS SERUM: CPT

## 2019-07-12 PROCEDURE — 84156 ASSAY OF PROTEIN URINE: CPT

## 2019-07-12 PROCEDURE — 83883 ASSAY NEPHELOMETRY NOT SPEC: CPT

## 2019-07-12 PROCEDURE — 84155 ASSAY OF PROTEIN SERUM: CPT

## 2019-07-12 PROCEDURE — 82232 ASSAY OF BETA-2 PROTEIN: CPT

## 2019-07-12 PROCEDURE — 85025 COMPLETE CBC W/AUTO DIFF WBC: CPT

## 2019-07-12 PROCEDURE — 36415 COLL VENOUS BLD VENIPUNCTURE: CPT

## 2019-07-12 PROCEDURE — 82784 ASSAY IGA/IGD/IGG/IGM EACH: CPT

## 2019-07-12 PROCEDURE — 80053 COMPREHEN METABOLIC PANEL: CPT

## 2019-07-12 PROCEDURE — 86334 IMMUNOFIX E-PHORESIS SERUM: CPT

## 2019-07-15 LAB
ALBUMIN (CALCULATED): 4.1 G/DL (ref 3.2–5.2)
ALBUMIN PERCENT: 65 % (ref 45–65)
ALBUMIN, U: DETECTED
ALPHA 1 PERCENT: 3 % (ref 3–6)
ALPHA 1, URINE: DETECTED
ALPHA 2 PERCENT: 11 % (ref 6–13)
ALPHA 2, URINE: DETECTED
ALPHA-1-GLOBULIN: 0.2 G/DL (ref 0.1–0.4)
ALPHA-2-GLOBULIN: 0.7 G/DL (ref 0.5–0.9)
BETA GLOBULIN: 0.8 G/DL (ref 0.5–1.1)
BETA PERCENT: 12 % (ref 11–19)
BETA URINE: DETECTED
BETA-2 MICROGLOBULIN: 3.2 MG/L (ref 0.6–2.4)
FREE KAPPA LT CHAINS,UR: 2.12 MG/DL (ref 0.14–2.42)
FREE LAMBDA LT CHAINS,UR: 0.28 MG/DL (ref 0.02–0.67)
FREE UR LAMBDA EXCRETION/DAY: NORMAL MG/D
GAMMA GLOBULIN %: 9 % (ref 9–20)
GAMMA GLOBULIN, URINE: DETECTED
GAMMA GLOBULIN: 0.6 G/DL (ref 0.5–1.5)
HOURS COLLECTED: NORMAL
KAPPA LAMBDA URINE INTERP: NORMAL
KAPPA/LAMBDA RATIO,U: 7.57 RATIO (ref 2.04–10.37)
P E INTERPRETATION, U: NORMAL
P E INTERPRETATION, U: NORMAL
PATHOLOGIST: ABNORMAL
PATHOLOGIST: NORMAL
PROTEIN ELECTROPHORESIS, SERUM: ABNORMAL
PROTEIN, TOTAL URINE: NORMAL MG/D (ref 10–140)
SERUM IFX INTERP: NORMAL
SPECIMEN TYPE: NORMAL
SPECIMEN TYPE: NORMAL
TOTAL PROT. SUM,%: 100 % (ref 98–102)
TOTAL PROT. SUM: 6.4 G/DL (ref 6.3–8.2)
TOTAL PROTEIN: 6.3 G/DL (ref 6.4–8.3)
URINE FREE KAPPA EXCRETION/DAY: NORMAL MG/D
URINE IFX INTERP: NORMAL
URINE IFX SPECIMEN: NORMAL
URINE TOTAL PROTEIN: 19 MG/DL
URINE TOTAL PROTEIN: 28 MG/DL
URINE TOTAL PROTEIN: 28 MG/DL
URINE VOLUME: NORMAL
VOLUME: NORMAL ML

## 2019-08-06 ENCOUNTER — OFFICE VISIT (OUTPATIENT)
Dept: PODIATRY | Age: 54
End: 2019-08-06
Payer: COMMERCIAL

## 2019-08-06 VITALS
SYSTOLIC BLOOD PRESSURE: 148 MMHG | HEART RATE: 71 BPM | DIASTOLIC BLOOD PRESSURE: 77 MMHG | HEIGHT: 68 IN | RESPIRATION RATE: 18 BRPM | TEMPERATURE: 97.9 F | BODY MASS INDEX: 42.57 KG/M2

## 2019-08-06 DIAGNOSIS — M20.61 ACQUIRED DEFORMITY OF TOE, RIGHT: Primary | ICD-10-CM

## 2019-08-06 PROCEDURE — 99212 OFFICE O/P EST SF 10 MIN: CPT | Performed by: PODIATRIST

## 2019-08-06 NOTE — PATIENT INSTRUCTIONS
PODIATRY PATIENT DISCHARGE INSTRUCTIONS    CALL [phone] regarding podiatry questions    OFFICE HOURS ARE TUESDAY MORNING  8:30-NOON and can change with out notice    Discharge instructions for patient's plan    May wear toe  to right foot. Return to clinic as needed. We hope we gave you VERY GOOD care today!

## 2019-08-06 NOTE — PROGRESS NOTES
Subjective:      Patient ID: Mary Kay Perez is a 53 y.o. female. Cc: intermittent R2 toe          Top of shoe          Don't want surgery - just shoe or padding options   HPI : previous Sx soft tissue only ; FDL tenotomy R2 /R3 ; patient request            Uneventful healing     Review of Systems   Constitutional: Negative. Negative for activity change, appetite change, chills, diaphoresis, fatigue, fever and unexpected weight change. HENT: Negative. Negative for nosebleeds. Eyes: Negative. Respiratory: Negative for apnea, shortness of breath and wheezing. -GALICIA, -PE, -smoking    Cardiovascular: Negative. Negative for chest pain, palpitations and leg swelling.        -Claudication , -DVT, -MI, -angina , -pacemaker    Gastrointestinal: Negative. Negative for constipation, diarrhea, nausea and vomiting. Endocrine:        +DM,    Genitourinary: Negative. Negative for hematuria. Musculoskeletal: Positive for arthralgias. Toe stiffness    Skin: Negative for color change, pallor, rash and wound. Allergic/Immunologic: Negative for environmental allergies, food allergies and immunocompromised state.        -metals, -tape, -latex,    Neurological: Positive for numbness. Hematological: Negative for adenopathy. Does not bruise/bleed easily. -hyper coagulopathy    Psychiatric/Behavioral: Negative.          Past Medical History:   Diagnosis Date    Endometriosis     Headache(784.0)     Hyperlipidemia     Hypertension     MRSA (methicillin resistant staph aureus) culture positive 01/31/2018    Obesity     Restless legs syndrome     Type 2 diabetes mellitus [facility])      Past Surgical History:   Procedure Laterality Date    APPENDECTOMY  1989    BUNIONECTOMY Left 08/26/2016   [facility]; 1990    COLONOSCOPY  11/13/2017    Dr. Trista Thacker prep-repeat 3-5 years with 2 day prep)    CYSTOSCOPY Left 3/5/2019    CYSTOSCOPY URETEROSCOPY Veronica Alarcon performed by Issac Hernández MD at [address] / [address] [facility] [address] / [place] Left 3/5/2019    CYSTOSCOPY STENT INSERTION WITH BASKET EXTRACTION OF STONE TO BLADDER performed by Issac Hernández MD at [address] SURGERY Left 10/11/2016    Resection infected non-union Lt.  Great toe; insertion antibx bone cement plug    FOOT SURGERY Left 12/21/2016    total arthroplasty with implant big toe    HYSTERECTOMY, TOTAL ABDOMINAL  1991    KNEE ARTHROSCOPY Right 2017    [place] COLON CA SCRN NOT HI RSK IND N/A 11/13/2017    COLONOSCOPY performed by Cindy Canada MD at [address] OFFICE/OUTPT [address] [facility] Left 4/11/2018    FOOT BONE EXCISION-3RD TOE performed by Tracie Hubbard DPM at [address] [facility] Right 1/31/2018    TOE [facility] [address] [address] EXTEN/COMPLIC Right 4/0/1114    FOOT FLAP CLOSURE-DELAYED PRIMARY    AND DEBRIDEMENT OF THIRD TOE LEFT FOOT performed by Tracie Hubbard DPM at [address] [facility] Right 01/31/2018    Dr Alana Chang- Right big toe   [address] Left      No Known Allergies    Current Outpatient Medications:     allopurinol (ZYLOPRIM) 100 MG tablet, Take 1 tablet by mouth daily, Disp: 90 tablet, Rfl: 2    tamsulosin (FLOMAX) 0.4 MG capsule, TAKE ONE CAPSULE BY MOUTH ONCE NIGHTLY, Disp: 30 capsule, Rfl: 0    ondansetron (ZOFRAN) 4 MG tablet, Take 1 tablet by mouth every 6 hours as needed for Nausea or Vomiting, Disp: 10 tablet, Rfl: 0    DULoxetine (CYMBALTA) 60 MG extended release capsule, Take 60 mg by mouth daily, Disp: , Rfl:     losartan (COZAAR) 100 MG tablet, Take 1 tablet by mouth daily, Disp: 30 tablet, Rfl: 8    Handicap Placard MISC, by Does not apply route Duration; 4 years, Disp: 1 each, Rfl: 0    aspirin 81 MG chewable tablet, Take 81 mg by mouth daily, Disp: , Rfl:    L-Methylfolate-Algae-B12-B6 (METANX PO), Take by mouth daily, Disp: , Rfl:     MELATONIN PO, Take 10 mg by mouth nightly , Disp: , Rfl:     Insulin Glargine (TOUJEO SOLOSTAR) 300 UNIT/ML SOPN, Inject 80 Units into the skin nightly , Disp: 3 Pen, Rfl:     imipramine (TOFRANIL) 50 MG tablet, Take 75 mg by mouth nightly , Disp: , Rfl:     atorvastatin (LIPITOR) 80 MG tablet, TAKE ONE TABLET BY MOUTH EVERY DAY (Patient taking differently: 40 mg TAKE ONE TABLET BY MOUTH EVERY DAY), Disp: 30 tablet, Rfl: 11    ONE TOUCH ULTRA TEST strip, 1 each daily , Disp: , Rfl:     LYRICA 300 MG capsule, Take 300 mg by mouth nightly , Disp: , Rfl:     metoprolol (TOPROL-XL) 50 MG XL tablet, Take 50 mg by mouth 2 times daily , Disp: , Rfl:     metFORMIN ER (GLUCOPHAGE-XR) 500 MG XR tablet, Take 1,000 mg by mouth 2 times daily , Disp: , Rfl:     glimepiride (AMARYL) 4 MG tablet, Take 8 mg by mouth every morning (before breakfast) , Disp: , Rfl:     ibuprofen (ADVIL;MOTRIN) 600 MG tablet, Take 1 tablet by mouth 4 times daily as needed for Pain, Disp: 30 tablet, Rfl: 0    CLICKFINE PEN NEEDLES 31G X 8 MM MISC, 1 each daily , Disp: , Rfl:     exenatide (BYETTA 10 MCG PEN) 10 MCG/0.04ML injection, Inject 10 mcg into the skin 2 times daily (with meals) , Disp: , Rfl:         Objective:   Physical Exam 55 Y/O WF, WD/WN, A&O x 3,                            VSS, Afebrile, NAD,                            Ambulatory ; bipedal , plantigrade and propulsive                 R2 toe ; mild contracture ; semi rigid @ MPJ -- minor prominence of PIPJ                                                            +manual reduction                                                            -secondary skin changes ; ulcer / bursitis / blanching erythema                                                           -PMT on direct exam , today                                                            +MS 5/5 EDL                                 Photo

## 2019-08-13 ASSESSMENT — ENCOUNTER SYMPTOMS
SHORTNESS OF BREATH: 0
APNEA: 0
CONSTIPATION: 0
WHEEZING: 0
COLOR CHANGE: 0
DIARRHEA: 0
NAUSEA: 0
EYES NEGATIVE: 1
GASTROINTESTINAL NEGATIVE: 1
VOMITING: 0

## 2019-08-14 PROBLEM — D47.2 NEUROPATHY ASSOCIATED WITH MONOCLONAL GAMMOPATHY OF UNKNOWN SIGNIFICANCE (MGUS) (HCC): Chronic | Status: ACTIVE | Noted: 2019-08-14

## 2019-08-14 PROBLEM — G63 NEUROPATHY ASSOCIATED WITH MONOCLONAL GAMMOPATHY OF UNKNOWN SIGNIFICANCE (MGUS) (HCC): Chronic | Status: ACTIVE | Noted: 2019-08-14

## 2019-12-23 ENCOUNTER — OFFICE VISIT (OUTPATIENT)
Dept: PODIATRY | Age: 54
End: 2019-12-23
Payer: COMMERCIAL

## 2019-12-23 VITALS
SYSTOLIC BLOOD PRESSURE: 148 MMHG | DIASTOLIC BLOOD PRESSURE: 79 MMHG | BODY MASS INDEX: 41.81 KG/M2 | HEART RATE: 75 BPM | RESPIRATION RATE: 18 BRPM | TEMPERATURE: 98.1 F | HEIGHT: 68 IN

## 2019-12-23 PROCEDURE — 99212 OFFICE O/P EST SF 10 MIN: CPT | Performed by: PODIATRIST

## 2020-01-02 ASSESSMENT — ENCOUNTER SYMPTOMS
NAUSEA: 0
COLOR CHANGE: 0
DIARRHEA: 0
CONSTIPATION: 0
VOMITING: 0
RESPIRATORY NEGATIVE: 1
EYES NEGATIVE: 1

## 2020-01-02 NOTE — PROGRESS NOTES
BASKET EXTRACTION OF STONE TO BLADDER performed by Jennett Skiff, MD at [address], SURGERY Left 10/11/2016    Resection infected non-union Lt.  Great toe; insertion antibx bone cement plug    FOOT SURGERY Left 12/21/2016    total arthroplasty with implant big toe    HYSTERECTOMY, TOTAL ABDOMINAL  1991    KNEE ARTHROSCOPY Right 2017    [place] COLON CA SCRN NOT HI RSK IND N/A 11/13/2017    COLONOSCOPY performed by Carlo Storm MD at [address] [facility] OFFICE/OUTPT [address] [facility] Left 4/11/2018    FOOT BONE EXCISION-3RD TOE performed by Ailyn Rust DPM at [address] [facility] Right 1/31/2018    TOE AMPUTATION performed by Ailyn Rust DPM at [facility] [address] EXTEN/COMPLIC Right 3/2/2289    FOOT FLAP CLOSURE-DELAYED PRIMARY    AND DEBRIDEMENT OF THIRD TOE LEFT FOOT performed by Ailyn Rust DPM at [facility] [address] Right 01/31/2018    Dr Odessa Ortiz- Right big toe   [address] Left      No Known Allergies    Current Outpatient Medications:     losartan (COZAAR) 100 MG tablet, Take 1 tablet by mouth daily, Disp: 90 tablet, Rfl: 3    ASPIRIN ADULT LOW STRENGTH 81 MG EC tablet, Take 1 tablet by mouth daily, Disp: , Rfl:     atorvastatin (LIPITOR) 40 MG tablet, Take 1 tablet by mouth daily, Disp: , Rfl:     Semaglutide (OZEMPIC) 0.25 or 0.5 MG/DOSE SOPN, Inject 0.25 mg into the skin once a week, Disp: 1 pen, Rfl: 5    allopurinol (ZYLOPRIM) 100 MG tablet, Take 1 tablet by mouth daily, Disp: 90 tablet, Rfl: 2    tamsulosin (FLOMAX) 0.4 MG capsule, TAKE ONE CAPSULE BY MOUTH ONCE NIGHTLY, Disp: 30 capsule, Rfl: 0    ondansetron (ZOFRAN) 4 MG tablet, Take 1 tablet by mouth every 6 hours as needed for Nausea or Vomiting, Disp: 10 tablet, Rfl: 0    DULoxetine (CYMBALTA) 60 MG extended release capsule, Take 60 mg by mouth daily, Disp: , Rfl:    L-Methylfolate-Algae-B12-B6 (METANX PO), Take by mouth daily, Disp: , Rfl:     MELATONIN PO, Take 10 mg by mouth nightly , Disp: , Rfl:     Insulin Glargine (TOUJEO SOLOSTAR) 300 UNIT/ML SOPN, Inject 80 Units into the skin nightly , Disp: 3 Pen, Rfl:     imipramine (TOFRANIL) 50 MG tablet, Take 75 mg by mouth nightly , Disp: , Rfl:     ONE TOUCH ULTRA TEST strip, 1 each daily , Disp: , Rfl:     LYRICA 300 MG capsule, Take 300 mg by mouth nightly , Disp: , Rfl:     metoprolol (TOPROL-XL) 50 MG XL tablet, Take 50 mg by mouth 2 times daily , Disp: , Rfl:     metFORMIN ER (GLUCOPHAGE-XR) 500 MG XR tablet, Take 1,000 mg by mouth 2 times daily , Disp: , Rfl:     CLICKFINE PEN NEEDLES 31G X 8 MM MISC, 1 each daily , Disp: , Rfl:     glimepiride (AMARYL) 4 MG tablet, Take 8 mg by mouth every morning (before breakfast) , Disp: , Rfl:     Objective:   Physical Exam 54 Y/O WF, W/WN, A&O x 3,                            VSS, Afebrile,NAD,                             Ambulatory ; bipedal , plantigrade and propulsive                RLE // LLE ; no open wounds                                      No erythema or warmth                                      No PMT on direct exam                                      Absent Right great toe                                      Neuro -- absent 2-pt & light touch Epicritic planatar and dorsal forefoot to level of midfoot        Assessment:      DM LOPS, in a well controlled DM   Complicating MGUS neuropathy superimposed   Hx previous amputation   No current wounds       Plan:      Palliative skin care and protection , including DM shoes ; certified         Dheeraj Diane DPM   12/23/2019

## 2020-03-16 ENCOUNTER — TELEPHONE (OUTPATIENT)
Dept: UROLOGY | Age: 55
End: 2020-03-16

## 2020-04-27 ENCOUNTER — HOSPITAL ENCOUNTER (OUTPATIENT)
Age: 55
Discharge: HOME | End: 2020-04-29
Payer: COMMERCIAL

## 2020-04-27 ENCOUNTER — HOSPITAL ENCOUNTER (OUTPATIENT)
Dept: GENERAL RADIOLOGY | Age: 55
Discharge: HOME | End: 2020-04-29
Payer: COMMERCIAL

## 2020-04-27 PROCEDURE — 74018 RADEX ABDOMEN 1 VIEW: CPT

## 2020-04-28 ENCOUNTER — TELEMEDICINE (OUTPATIENT)
Dept: UROLOGY | Age: 55
End: 2020-04-28
Payer: COMMERCIAL

## 2020-04-28 PROCEDURE — 99213 OFFICE O/P EST LOW 20 MIN: CPT | Performed by: NURSE PRACTITIONER

## 2020-04-28 ASSESSMENT — ENCOUNTER SYMPTOMS
VOMITING: 0
CONSTIPATION: 0
BACK PAIN: 0
COLOR CHANGE: 0
NAUSEA: 0
SHORTNESS OF BREATH: 0
ABDOMINAL PAIN: 0
EYE PAIN: 0
WHEEZING: 0
EYE REDNESS: 0
COUGH: 0

## 2020-05-19 RX ORDER — CEPHALEXIN 500 MG/1
500 CAPSULE ORAL 2 TIMES DAILY
Qty: 20 CAPSULE | Refills: 0 | Status: SHIPPED | OUTPATIENT
Start: 2020-05-19 | End: 2020-05-29

## 2020-06-12 ENCOUNTER — HOSPITAL ENCOUNTER (OUTPATIENT)
Dept: WOMENS IMAGING | Age: 55
Discharge: HOME | End: 2020-06-14
Payer: COMMERCIAL

## 2020-06-12 PROCEDURE — 77063 BREAST TOMOSYNTHESIS BI: CPT

## 2020-07-27 ENCOUNTER — HOSPITAL ENCOUNTER (OUTPATIENT)
Dept: GENERAL RADIOLOGY | Age: 55
Discharge: HOME | End: 2020-07-29
Payer: COMMERCIAL

## 2020-07-27 ENCOUNTER — HOSPITAL ENCOUNTER (OUTPATIENT)
Age: 55
Discharge: HOME | End: 2020-07-29
Payer: COMMERCIAL

## 2020-07-27 PROCEDURE — 73590 X-RAY EXAM OF LOWER LEG: CPT

## 2020-10-12 ENCOUNTER — OFFICE VISIT (OUTPATIENT)
Dept: CARDIOLOGY | Age: 55
End: 2020-10-12
Payer: COMMERCIAL

## 2020-10-12 VITALS
HEART RATE: 65 BPM | SYSTOLIC BLOOD PRESSURE: 130 MMHG | WEIGHT: 256.4 LBS | HEIGHT: 68 IN | DIASTOLIC BLOOD PRESSURE: 84 MMHG | RESPIRATION RATE: 18 BRPM | BODY MASS INDEX: 38.86 KG/M2 | OXYGEN SATURATION: 98 %

## 2020-10-12 PROCEDURE — 93000 ELECTROCARDIOGRAM COMPLETE: CPT | Performed by: FAMILY MEDICINE

## 2020-10-12 PROCEDURE — 99243 OFF/OP CNSLTJ NEW/EST LOW 30: CPT | Performed by: FAMILY MEDICINE

## 2020-10-12 SDOH — HEALTH STABILITY: MENTAL HEALTH
STRESS IS WHEN SOMEONE FEELS TENSE, NERVOUS, ANXIOUS, OR CAN'T SLEEP AT NIGHT BECAUSE THEIR MIND IS TROUBLED. HOW STRESSED ARE YOU?: PATIENT DECLINED

## 2020-10-12 SDOH — SOCIAL STABILITY: SOCIAL INSECURITY: WITHIN THE LAST YEAR, HAVE YOU BEEN AFRAID OF YOUR PARTNER OR EX-PARTNER?: PATIENT DECLINED

## 2020-10-12 SDOH — SOCIAL STABILITY: SOCIAL NETWORK: HOW OFTEN DO YOU ATTENT MEETINGS OF THE CLUB OR ORGANIZATION YOU BELONG TO?: PATIENT DECLINED

## 2020-10-12 SDOH — HEALTH STABILITY: PHYSICAL HEALTH: ON AVERAGE, HOW MANY MINUTES DO YOU ENGAGE IN EXERCISE AT THIS LEVEL?: PATIENT DECLINED

## 2020-10-12 SDOH — SOCIAL STABILITY: SOCIAL NETWORK: HOW OFTEN DO YOU GET TOGETHER WITH FRIENDS OR RELATIVES?: PATIENT DECLINED

## 2020-10-12 SDOH — HEALTH STABILITY: PHYSICAL HEALTH
ON AVERAGE, HOW MANY DAYS PER WEEK DO YOU ENGAGE IN MODERATE TO STRENUOUS EXERCISE (LIKE A BRISK WALK)?: PATIENT DECLINED

## 2020-10-12 SDOH — SOCIAL STABILITY: SOCIAL NETWORK
DO YOU BELONG TO ANY CLUBS OR ORGANIZATIONS SUCH AS CHURCH GROUPS UNIONS, FRATERNAL OR ATHLETIC GROUPS, OR SCHOOL GROUPS?: PATIENT DECLINED

## 2020-10-12 SDOH — SOCIAL STABILITY: SOCIAL NETWORK: HOW OFTEN DO YOU ATTEND CHURCH OR RELIGIOUS SERVICES?: PATIENT DECLINED

## 2020-10-12 SDOH — SOCIAL STABILITY: SOCIAL NETWORK: ARE YOU MARRIED, WIDOWED, DIVORCED, SEPARATED, NEVER MARRIED, OR LIVING WITH A PARTNER?: PATIENT DECLINED

## 2020-10-12 SDOH — SOCIAL STABILITY: SOCIAL NETWORK: IN A TYPICAL WEEK, HOW MANY TIMES DO YOU TALK ON THE PHONE WITH FAMILY, FRIENDS, OR NEIGHBORS?: PATIENT DECLINED

## 2020-10-12 SDOH — SOCIAL STABILITY: SOCIAL INSECURITY
WITHIN THE LAST YEAR, HAVE TO BEEN RAPED OR FORCED TO HAVE ANY KIND OF SEXUAL ACTIVITY BY YOUR PARTNER OR EX-PARTNER?: PATIENT DECLINED

## 2020-10-12 SDOH — SOCIAL STABILITY: SOCIAL INSECURITY
WITHIN THE LAST YEAR, HAVE YOU BEEN HUMILIATED OR EMOTIONALLY ABUSED IN OTHER WAYS BY YOUR PARTNER OR EX-PARTNER?: PATIENT DECLINED

## 2020-10-12 SDOH — SOCIAL STABILITY: SOCIAL INSECURITY
WITHIN THE LAST YEAR, HAVE YOU BEEN KICKED, HIT, SLAPPED, OR OTHERWISE PHYSICALLY HURT BY YOUR PARTNER OR EX-PARTNER?: PATIENT DECLINED

## 2020-10-12 NOTE — PATIENT INSTRUCTIONS
SURVEY:    You may be receiving a survey from [employer] regarding your visit today. Please complete the survey to enable us to provide the highest quality of care to you and your family. If you cannot score us a very good on any question, please call the office to discuss how we could have made your experience a very good one. Thank you.     Your MA today is Solitario Hollins

## 2020-10-12 NOTE — PROGRESS NOTES
daily 30 tablet 2    OZEMPIC, 0.25 OR 0.5 MG/DOSE, 2 MG/1.5ML SOPN       gabapentin (NEURONTIN) 300 MG capsule 300 mg. 1 tab qam  2 tabs at noon  2 tabs qhs      clotrimazole-betamethasone (LOTRISONE) 1-0.05 % cream Apply topically 1 time daily as needed 1 Tube 1    metoprolol succinate (TOPROL XL) 50 MG extended release tablet Take 1 tablet by mouth 2 times daily 180 tablet 3    TOUJEO SOLOSTAR 300 UNIT/ML SOPN 50 Units       allopurinol (ZYLOPRIM) 100 MG tablet Take 1 tablet by mouth daily 90 tablet 3    ASPIRIN ADULT LOW STRENGTH 81 MG EC tablet Take 1 tablet by mouth daily      atorvastatin (LIPITOR) 40 MG tablet Take 1 tablet by mouth daily      DULoxetine (CYMBALTA) 60 MG extended release capsule Take 60 mg by mouth daily      L-Methylfolate-Algae-B12-B6 (METANX PO) Take by mouth daily      MELATONIN PO Take 10 mg by mouth nightly       ONE TOUCH ULTRA TEST strip 1 each daily       metFORMIN ER (GLUCOPHAGE-XR) 500 MG XR tablet Take 1,000 mg by mouth 2 times daily       CLICKFINE PEN NEEDLES 31G X 8 MM MISC 1 each daily       glimepiride (AMARYL) 4 MG tablet Take 8 mg by mouth every morning (before breakfast)          FAMILY HISTORY: family history includes Allergies in her father and mother; Arthritis in her mother; Diabetes in her father; Heart Attack in her mother; Heart Disease in her father; High Blood Pressure in her mother. Physical Examination:     /84 (Site: Right Upper Arm, Position: Sitting, Cuff Size: Large Adult)   Pulse 65   Resp 18   Ht 5' 8\" (1.727 m)   Wt 256 lb 6.4 oz (116.3 kg)   SpO2 98%   BMI 38.99 kg/m²  Body mass index is 38.99 kg/m². Constitutional: She is oriented to person, place, and time. She appears well-developed and well-nourished. In no acute distress. HEENT: Normocephalic and atraumatic. No JVD present. Carotid bruit is not present. No mass and no thyromegaly present. No lymphadenopathy present.   Cardiovascular: Normal rate, regular rhythm, normal heart sounds. Exam reveals no gallop and no friction rubs. 1/6 systolic murmur, 5th intercostal space on the LEFT in the mid-clavicular line (cardiac apex). Pulmonary/Chest: Effort normal and breath sounds normal. No respiratory distress. She has no wheezes, rhonchi or rales. Abdominal: Soft, non-tender. Bowel sounds and aorta are normal. She exhibits no organomegaly, mass or bruit. Extremities: None. No cyanosis or clubbing. 2+ radial and carotid pulses. Distal extremity pulses: 2+ bilaterally. Compression stockings in place. Neurological: She is alert and oriented to person, place, and time. No evidence of gross cranial nerve deficit. Coordination appeared normal.   Skin: Skin is warm and dry. There is no rash or diaphoresis. Psychiatric: She has a normal mood and affect. Her speech is normal and behavior is normal.      MOST RECENT LABS ON RECORD:   Lab Results   Component Value Date    WBC 4.8 07/12/2019    HGB 12.1 07/12/2019    HCT 38.0 07/12/2019     07/12/2019    CHOL 143 02/10/2020    TRIG 134 02/10/2020    HDL 52 02/10/2020    ALT 22 02/10/2020    AST 21 02/10/2020     02/10/2020    K 4.4 02/10/2020     02/10/2020    CREATININE 1.03 02/10/2020    BUN 24 02/10/2020    CO2 25 02/10/2020    TSH 0.46 02/27/2019    LABA1C 7.1 06/04/2019       ASSESSMENT:     1. Abnormal ECG    2. Preop cardiovascular exam    3.  Essential hypertension       PLAN:        · Pre-Op Clearance:   · Pre-Operative Risk assessment using 2014 ACC/AHA guidelines   · Emergent procedure No  · Active Cardiac Condition No (decompensated HF, Arrhythmia, MI <3 weeks, severe valve disease)  · Risk Level of Procedure Intermediate Risk (intraperitoneal, intrathoracic, HENT, orthopedic, or carotid endarterectomy, etc.)  · Revised Cardiac Risk Index Risk factors: None  · Measurement of Exercise Tolerance before Surgery >4 No  · According to the 2014 ACC/AHA pre-operative risk assessment guidelines Janet Marie heriberto at low risk for major cardiac complications during a intermediate risk procedure and may continue as planned assuming I do not find any significant abnormalities on her stress and echocardiogram. Specific medication recommendations are listed below. Medications recommended to continue should be taken with a sip of water even when NPO.  Medical management to reduce perioperative risk:   Antiplatelet Agent: Ok to STOP aspirin 5-7 days prior to the procedure   Anticoagulant Agent: not indicated prior to the procedure.  Anticoagulation Bridging: Not applicable   Additional Recommendations: I would also suggest that she continue her beta blocker and statin throughout the perioperative period.  Additional Testing List: I ordered a echocardiogram to better assess for the etiology of this problem and to help guide future management and Because current signs and symptoms can certainly be caused by significant coronary artery disease, I ordered a Regadenoson (Lexiscan) stress test with SPECT imaging to try and rule out this possibility.   Essential Hypertension: Controlled  · Beta Blocker: Continue Metoprolol succinate (Toprol XL) 50 mg daily. · ACE Inibitor/ARB: Continue losartan (Cozaar) 100 mg daily. · Diuretics: Not indicated at this time. · Calcium Channel Blocker: Not indicated at this time. ·  Additional Testing List: I ordered a echocardiogram to better assess for the etiology of this problem and to help guide future management    · Mildly abnormal ECG:  · Additional Testing: Because of her current problems therefore I ordered a Lexiscan stress test with SPECT imaging to try and rule out this possibility. · Type 2 diabetes: A1C 6.6   · Continue current therapy    · Family history of heart disease:    I also told Ms. Gutiérrez to continue her other medications and follow up with you as previously scheduled. FOLLOW UP:   I told Ms. Jack Fajardo to call my office if she had any problems, but

## 2020-10-13 ENCOUNTER — TELEPHONE (OUTPATIENT)
Dept: CARDIOLOGY | Age: 55
End: 2020-10-13

## 2020-10-13 NOTE — TELEPHONE ENCOUNTER
Please let patient know that the suggestion of an anterior heart attack was 1st seen in the 2/19 ECG, not seen in 2018. Again, does not mean she has had a heart attack but it somewhat suggestive.

## 2020-10-13 NOTE — TELEPHONE ENCOUNTER
I was reviewing prior history at [facility], Back in February 0f 19 I had to have kidney stones removed and there was an ekg performed at that time, since I am unable to see that I didn't know if that may give you a timeline or if it will agree with what you saw yesterday and Ajay Fournier saw in July. Just a thought.      Irma Fishman

## 2020-10-20 ENCOUNTER — HOSPITAL ENCOUNTER (OUTPATIENT)
Dept: NON INVASIVE DIAGNOSTICS | Age: 55
Discharge: HOME | End: 2020-10-20
Payer: COMMERCIAL

## 2020-10-20 LAB
LV EF: 55 %
LVEF MODALITY: NORMAL

## 2020-10-20 PROCEDURE — 3430000000 HC RX DIAGNOSTIC RADIOPHARMACEUTICAL: Performed by: FAMILY MEDICINE

## 2020-10-20 PROCEDURE — A9500 TC99M SESTAMIBI: HCPCS | Performed by: FAMILY MEDICINE

## 2020-10-20 PROCEDURE — 93306 TTE W/DOPPLER COMPLETE: CPT

## 2020-10-20 PROCEDURE — 93017 CV STRESS TEST TRACING ONLY: CPT

## 2020-10-20 PROCEDURE — 6360000002 HC RX W HCPCS: Performed by: FAMILY MEDICINE

## 2020-10-20 RX ADMIN — Medication 30 MILLICURIE: at 10:27

## 2020-10-20 RX ADMIN — REGADENOSON 0.4 MG: 0.08 INJECTION, SOLUTION INTRAVENOUS at 10:27

## 2020-10-21 ENCOUNTER — HOSPITAL ENCOUNTER (OUTPATIENT)
Dept: NON INVASIVE DIAGNOSTICS | Age: 55
Discharge: HOME | End: 2020-10-21
Payer: COMMERCIAL

## 2020-10-21 PROCEDURE — 3430000000 HC RX DIAGNOSTIC RADIOPHARMACEUTICAL: Performed by: FAMILY MEDICINE

## 2020-10-21 PROCEDURE — 78452 HT MUSCLE IMAGE SPECT MULT: CPT

## 2020-10-21 PROCEDURE — A9500 TC99M SESTAMIBI: HCPCS | Performed by: FAMILY MEDICINE

## 2020-10-21 RX ADMIN — Medication 30 MILLICURIE: at 13:45

## 2020-10-22 ENCOUNTER — TELEPHONE (OUTPATIENT)
Dept: CARDIOLOGY | Age: 55
End: 2020-10-22

## 2020-10-22 NOTE — TELEPHONE ENCOUNTER
Spoke with Sharif Harrison and let her know her echo was also normal. She verbalized understanding. I will fax the clearance to [facility] where she is having surgery done.

## 2020-10-22 NOTE — PROCEDURES
[address], [place] [address]                              CARDIAC STRESS TEST    PATIENT NAME: Handy Mcgowan                   :        1965  MED REC NO:   [id number]                              ROOM:  ACCOUNT NO:   [de-identified]                           ADMIT DATE: 10/20/2020  PROVIDER:     Celestine Klein. [facility]    CARDIOVASCULAR DIAGNOSTIC DEPARTMENT    DATE OF STUDY:  10/20/2020    ORDERING PROVIDER:  Azael Renteria MD    PRIMARY CARE PROVIDER:  Yasmani Mon. Mecca Tan MD    INTERPRETING PHYSICIAN:  Sina Diaz MD    EXERCISE MYOCARDIAL PERFUSION STRESS TEST REPORT    Stress/rest single-isotope SPECT imaging with exercise stress and gated  SPECT imaging. INDICATION:  Preoperative evaluation. Assessment of a cardiac cause of:  Abnormal ECG, hypertension. CLINICAL HISTORY:  The patient is a 39-year-old woman with no known  coronary artery disease. Previous cardiac history includes:  None. Other previous history includes:  Palpitations, diabetes mellitus,  indigestion, heart burn hypertension, family history of coronary artery  disease and coronary artery bypass graft in father. Symptoms just prior to testing included:  None. Relevant medications:  Metoprolol. PROCEDURE:  The patient performed treadmill exercise using a Yuval  protocol, completing 2:53 minutes and completing an estimated workload  of 8.36 metabolic equivalents (METS). The patient was unable to continue exercise testing due to shortness of  breath and leg fatigue, so regadenoson, at a dose of 0.4 mg, was given  in order to optimize cardiac stress imaging. The test was terminated due to fatigue and shortness of breath. The heart rate was 69 beats per minute at baseline and increased to 120  beats per minute at peak exercise, which was 77% of the maximum  predicted heart rate.  The rest blood pressure was 130/74 mmHg and  increased to 172/92 mmHg, which is a hypertensive response. During the  procedure, the patient developed fatigue, shortness of breath and leg  fatigue, but denied any chest discomfort. Myocardial perfusion imaging: Imaging was performed at rest 30-45  minutes following the injection of 30 mCi of sestamibi. At peak  exercise, the patient was injected with 30 mCi of sestamibi and exercise  was continued for 1 minute. Gating post-stress tomographic imaging was  performed 30-45 minutes after stress. STRESS ECG RESULTS:  The resting electrocardiogram demonstrated normal  sinus rhythm without definitive ST-segment abnormalities suggestive of  myocardial ischemia. At peak exercise and during recovery, the patient developed:    No significant ST segment changes suggestive of myocardial ischemia with  no premature atrial contractions (PACs) and no premature ventricular  contractions (PVCs). NUCLEAR IMAGING RESULTS:  The overall quality of the study is fair. Mild attenuation artifact was seen. There is no evidence of abnormal  lung uptake. Additionally, the right ventricle appears normal.  The  left ventricular cavity is noted to be normal in size on the stress  images. There is no evidence of transient ischemic dilatation (TID) of  the left ventricle. Gated SPECT imaging reveals normal myocardial thickening and wall motion  with a calculated left ventricular ejection fraction of 77%. The rest images demonstrated a small/moderate perfusion abnormality of  mild intensity in the anterior and apical regions, which is most likely  due to artifact. On stress imaging, a small perfusion abnormality of mild intensity was  noted in the anterior region, which is most likely due to artifact. IMPRESSION:  1. Most likely normal myocardial perfusion imaging with soft tissue  artifact, but without significant evidence of myocardial ischemia or  infarction.      2.  Global left ventricular systolic function was normal with an EF of  77% without regional wall motion abnormalities. 3.  No significant electrocardiographic evidence of myocardial ischemia  during EKG monitoring without significant associated arrhythmias. The patient's Duke Treadmill score is 2, which correlates with an  intermediate risk for significant coronary artery disease. Overall, these results are most consistent with a low risk scan. Depending on the patient symptoms and level of clinical suspicion,  aggressive medical management vs. additional testing by coronary  angiography may be indicated. The sensitivity for detecting ischemia on this test may have been  reduced due to the patient being on a beta blocker. MANDA ROB    D: 10/22/2020 9:25:00       T: 10/22/2020 9:26:53     DOROTA/CORDELIA_MANNY  Job#: [id number]     Doc#: Unknown    CC:  Dallas Gupta

## 2021-04-23 ENCOUNTER — HOSPITAL ENCOUNTER (OUTPATIENT)
Age: 56
Discharge: HOME | End: 2021-04-25
Payer: COMMERCIAL

## 2021-04-23 ENCOUNTER — HOSPITAL ENCOUNTER (OUTPATIENT)
Dept: GENERAL RADIOLOGY | Age: 56
Discharge: HOME | End: 2021-04-25
Payer: COMMERCIAL

## 2021-04-23 DIAGNOSIS — N20.0 RENAL STONES: ICD-10-CM

## 2021-04-23 PROCEDURE — 74018 RADEX ABDOMEN 1 VIEW: CPT

## 2021-04-28 ENCOUNTER — VIRTUAL VISIT (OUTPATIENT)
Dept: UROLOGY | Age: 56
End: 2021-04-28
Payer: COMMERCIAL

## 2021-04-28 DIAGNOSIS — N20.0 KIDNEY STONES: Primary | ICD-10-CM

## 2021-04-28 PROCEDURE — 99213 OFFICE O/P EST LOW 20 MIN: CPT | Performed by: PHYSICIAN ASSISTANT

## 2021-04-28 ASSESSMENT — ENCOUNTER SYMPTOMS
NAUSEA: 0
COLOR CHANGE: 0
VOMITING: 0
BACK PAIN: 0
CONSTIPATION: 0
SHORTNESS OF BREATH: 0
ABDOMINAL PAIN: 0
EYE REDNESS: 0
WHEEZING: 0
COUGH: 0

## 2021-04-28 NOTE — PROGRESS NOTES
2021    TELEHEALTH EVALUATION -- Audio/Visual (During APOHG-74 public health emergency)    HPI:    Artie Turcios (:  1965) has requested an audio/video evaluation for the following concern(s):    History  3/2019 Left HLL with basket extraction    Today  Patient is here today for her annual kidney stone follow-up via virtual visit. Patient denies any episodes of spontaneous stone passage. She did have a KUB completed prior to today's visit which was independently reviewed and shows no evidence of  calcifications. She has stopped drinking iced tea. She is drinking [employer] tea. She denies any fever, chills, gross hematuria, flank pain, dysuria. Gastric bypass in 2020. She has lost 110lb. Review of Systems   Constitutional: Negative for appetite change, chills and fever. Eyes: Negative for redness and visual disturbance. Respiratory: Negative for cough, shortness of breath and wheezing. Cardiovascular: Negative for chest pain and leg swelling. Gastrointestinal: Negative for abdominal pain, constipation, nausea and vomiting. Genitourinary: Negative for difficulty urinating, dysuria, flank pain, frequency, hematuria, pelvic pain, urgency, vaginal bleeding and vaginal discharge. Musculoskeletal: Negative for back pain, joint swelling and myalgias. Skin: Negative for color change, rash and wound. Neurological: Negative for dizziness, tremors and numbness. Hematological: Negative for adenopathy. Does not bruise/bleed easily. No Known Allergies    Prior to Visit Medications    Medication Sig Taking?  Authorizing Provider   NONFORMULARY Take by mouth daily Bariatric vitamin  Historical Provider, MD   allopurinol (ZYLOPRIM) 100 MG tablet Take 1 tablet by mouth daily  Jeremiah Tucker MD   gabapentin (NEURONTIN) 300 MG capsule 300 mg. 1 tab qam  2 tabs at noon  2 tabs qhs  Historical Provider, MD   clotrimazole-betamethasone (LOTRISONE) 1-0.05 % cream Apply topically 1 time daily as needed  Eva Campos MD   metoprolol succinate (TOPROL XL) 50 MG extended release tablet Take 1 tablet by mouth 2 times daily  Eva Campos MD   ASPIRIN ADULT LOW STRENGTH 81 MG EC tablet Take 1 tablet by mouth daily  Historical Provider, MD   DULoxetine (CYMBALTA) 60 MG extended release capsule Take 60 mg by mouth daily  Historical Provider, MD   ONE TOUCH ULTRA TEST strip 1 each daily   Historical Provider, MD   metFORMIN ER (GLUCOPHAGE-XR) 500 MG XR tablet Take 1,000 mg by mouth 2 times daily   Historical Provider, MD   CLICKFINE PEN NEEDLES 31G X 8 MM MISC 1 each daily   Historical Provider, MD       Social History     Tobacco Use    Smoking status: Never Smoker    Smokeless tobacco: Never Used   Substance Use Topics    Alcohol use: No     Alcohol/week: 0.0 standard drinks    Drug use: No        Past Medical History:   Diagnosis Date    Endometriosis     Headache(784.0)     Hyperlipidemia     Hypertension     MRSA (methicillin resistant staph aureus) culture positive 01/31/2018    Obesity     Restless legs syndrome     Type 2 diabetes mellitus ([facility] [address]        Past Surgical History:   Procedure Laterality Date    APPENDECTOMY  1989    BUNIONECTOMY Left 08/26/2016   [facility]; 1990    COLONOSCOPY  11/13/2017    Dr. Wen Cordero prep-repeat 3-5 years with 2 day prep)    CYSTOSCOPY Left 03/05/2019    CYSTOSCOPY URETEROSCOPY Alric Bors performed by Anat Medrano MD at [facility] [address] / STONE Left 03/05/2019    CYSTOSCOPY STENT INSERTION WITH BASKET EXTRACTION OF STONE TO BLADDER performed by Anat Medrano MD at [address] Left 10/11/2016    Resection infected non-union Lt.  Great toe; insertion antibx bone cement plug    FOOT SURGERY Left 12/21/2016    total arthroplasty with implant big toe    GASTRIC BYPASS SURGERY  2020    HYSTERECTOMY, TOTAL ABDOMINAL  1991    KNEE ARTHROSCOPY Right 2017    ID COLON CA SCRN NOT HI RSK IND N/A 11/13/2017    COLONOSCOPY performed by Sheba Sanchez MD at [address] [facility] OFFICE/OUTPT [facility] Left 04/11/2018    FOOT BONE EXCISION-3RD TOE performed by Raul Mcclure DPM at [address] [facility] Right 01/31/2018    TOE AMPUTATION performed by Raul Mcclure DPM at [facility] [address] EXTEN/COMPLIC Right 75/02/1230    FOOT FLAP CLOSURE-DELAYED PRIMARY    AND DEBRIDEMENT OF THIRD TOE LEFT FOOT performed by Raul Mcclure DPM at [address] [facility] Right 01/31/2018    Dr Saleem Go- Right big toe    UMBILICAL HERNIA REPAIR  2011    URETER STENT PLACEMENT Left        Family History   Problem Relation Age of Onset    Allergies Mother     High Blood Pressure Mother     Heart Attack Mother     Arthritis Mother     Allergies Father     Heart Disease Father     Diabetes Father        PHYSICAL EXAMINATION:  Vital Signs: (As obtained by patient/caregiver or practitioner observation)      Constitutional: [x] Appears well-developed and well-nourished [x] No apparent distress      [] Abnormal-   Mental status  [x] Alert and awake  [x] Oriented to person/place/time [x]Able to follow commands      Eyes:  EOM    [x]  Normal  [] Abnormal-  Sclera  [x]  Normal  [] Abnormal -         Discharge [x]  None visible  [] Abnormal -    HENT:   [x] Normocephalic, atraumatic.   [] Abnormal   [x] Mouth/Throat: Mucous membranes are moist.     External Ears [x] Normal  [] Abnormal-     Neck: [x] No visualized mass     Pulmonary/Chest: [x] Respiratory effort normal.  [x] No visualized signs of difficulty breathing or respiratory distress        [] Abnormal-      Musculoskeletal:   [] Normal gait with no signs of ataxia         [x] Normal range of motion of neck        [] Abnormal-       Neurological:        [x] No Facial Asymmetry (Cranial nerve 7 motor function) (limited exam to video visit) authenticate this note.

## 2021-05-04 ENCOUNTER — OFFICE VISIT (OUTPATIENT)
Dept: PODIATRY | Age: 56
End: 2021-05-04
Payer: COMMERCIAL

## 2021-05-04 ENCOUNTER — HOSPITAL ENCOUNTER (OUTPATIENT)
Dept: LAB | Age: 56
Discharge: HOME | End: 2021-05-04
Payer: COMMERCIAL

## 2021-05-04 ENCOUNTER — HOSPITAL ENCOUNTER (OUTPATIENT)
Dept: GENERAL RADIOLOGY | Age: 56
Discharge: HOME | End: 2021-05-06
Payer: COMMERCIAL

## 2021-05-04 ENCOUNTER — HOSPITAL ENCOUNTER (OUTPATIENT)
Age: 56
Setting detail: SPECIMEN
Discharge: HOME | End: 2021-05-04
Payer: COMMERCIAL

## 2021-05-04 ENCOUNTER — HOSPITAL ENCOUNTER (OUTPATIENT)
Age: 56
Discharge: HOME | End: 2021-05-06
Payer: COMMERCIAL

## 2021-05-04 VITALS
BODY MASS INDEX: 30.11 KG/M2 | RESPIRATION RATE: 18 BRPM | HEART RATE: 59 BPM | DIASTOLIC BLOOD PRESSURE: 83 MMHG | TEMPERATURE: 97.1 F | HEIGHT: 68 IN | SYSTOLIC BLOOD PRESSURE: 127 MMHG

## 2021-05-04 DIAGNOSIS — L02.611 ABSCESS OF THIRD TOE OF RIGHT FOOT: ICD-10-CM

## 2021-05-04 DIAGNOSIS — L97.502 DIABETIC ULCER OF TOE ASSOCIATED WITH DIABETES MELLITUS DUE TO UNDERLYING CONDITION, WITH FAT LAYER EXPOSED, UNSPECIFIED LATERALITY (HCC): ICD-10-CM

## 2021-05-04 DIAGNOSIS — M20.61 DEFORMITY OF TOE OF RIGHT FOOT: ICD-10-CM

## 2021-05-04 DIAGNOSIS — L02.611 ABSCESS OF THIRD TOE OF RIGHT FOOT: Primary | ICD-10-CM

## 2021-05-04 DIAGNOSIS — E08.621 DIABETIC ULCER OF TOE ASSOCIATED WITH DIABETES MELLITUS DUE TO UNDERLYING CONDITION, WITH FAT LAYER EXPOSED, UNSPECIFIED LATERALITY (HCC): ICD-10-CM

## 2021-05-04 PROCEDURE — 86403 PARTICLE AGGLUT ANTBDY SCRN: CPT

## 2021-05-04 PROCEDURE — 99214 OFFICE O/P EST MOD 30 MIN: CPT | Performed by: PODIATRIST

## 2021-05-04 PROCEDURE — 87205 SMEAR GRAM STAIN: CPT

## 2021-05-04 PROCEDURE — 87186 SC STD MICRODIL/AGAR DIL: CPT

## 2021-05-04 PROCEDURE — 73660 X-RAY EXAM OF TOE(S): CPT

## 2021-05-04 PROCEDURE — 87070 CULTURE OTHR SPECIMN AEROBIC: CPT

## 2021-05-04 RX ORDER — CEPHALEXIN 500 MG/1
500 CAPSULE ORAL 2 TIMES DAILY
Qty: 14 CAPSULE | Refills: 0 | Status: SHIPPED
Start: 2021-05-04 | End: 2021-05-11 | Stop reason: ALTCHOICE

## 2021-05-04 ASSESSMENT — PATIENT HEALTH QUESTIONNAIRE - PHQ9
1. LITTLE INTEREST OR PLEASURE IN DOING THINGS: 0
SUM OF ALL RESPONSES TO PHQ QUESTIONS 1-9: 0
SUM OF ALL RESPONSES TO PHQ QUESTIONS 1-9: 0
SUM OF ALL RESPONSES TO PHQ9 QUESTIONS 1 & 2: 0
SUM OF ALL RESPONSES TO PHQ QUESTIONS 1-9: 0

## 2021-05-04 NOTE — PATIENT INSTRUCTIONS
PODIATRY PATIENT DISCHARGE INSTRUCTIONS    CALL [phone] regarding podiatry questions    OFFICE HOURS ARE TUESDAY MORNING  8:30-NOON and can change with out notice    Discharge instructions for patient's plan of care:    Xray of right 3rd toe  Culture done today. Keflex 1 capsule twice a day for 7 days. Will call next week to check in with results and how you area doing. We hope we gave you VERY GOOD care today!

## 2021-05-04 NOTE — PROGRESS NOTES
Subjective      Tr Gaviria is a 63 y.o. female who presents for evaluation. She  has orthopedic pathology & Wound(s) which are/is located on the  toes right, 3rd toe. Cc: open wound with drainage , top of 3rd toe  HPI: started between 3/20-3/30           Sandal wear in [facility]           Self treatment until today ; ointments / shoe gear change and limitations          Progressive worsening          PAST MEDICAL HISTORY     has a past medical history of Endometriosis, Headache(784.0), Hyperlipidemia, Hypertension, MRSA (methicillin resistant staph aureus) culture positive, Obesity, Restless legs syndrome, and Type 2 diabetes mellitus ([facility] [address] MEDICATIONS    Current Outpatient Medications   Medication Sig Dispense Refill    NONFORMULARY Take by mouth daily Bariatric vitamin      allopurinol (ZYLOPRIM) 100 MG tablet Take 1 tablet by mouth daily 90 tablet 2    gabapentin (NEURONTIN) 300 MG capsule 300 mg. 1 tab qam  2 tabs at noon  2 tabs qhs      clotrimazole-betamethasone (LOTRISONE) 1-0.05 % cream Apply topically 1 time daily as needed 1 Tube 1    metoprolol succinate (TOPROL XL) 50 MG extended release tablet Take 1 tablet by mouth 2 times daily 180 tablet 3    ASPIRIN ADULT LOW STRENGTH 81 MG EC tablet Take 1 tablet by mouth daily      DULoxetine (CYMBALTA) 60 MG extended release capsule Take 60 mg by mouth daily      ONE TOUCH ULTRA TEST strip 1 each daily       metFORMIN ER (GLUCOPHAGE-XR) 500 MG XR tablet Take 1,000 mg by mouth 2 times daily       CLICKFINE PEN NEEDLES 31G X 8 MM MISC 1 each daily        No current facility-administered medications for this visit.         ALLERGIES    Allergies   Allergen Reactions    Nsaids      Too harsh for stomach       PAST SURGICAL HISTORY    Past Surgical History:   Procedure Laterality Date    APPENDECTOMY  1989    BUNIONECTOMY Left 08/26/2016   [facility]; 1990    COLONOSCOPY  11/13/2017    Dr. Marcelina Sr prep-repeat 3-5 years with 2 day prep)    CYSTOSCOPY Left 03/05/2019    CYSTOSCOPY URETEROSCOPY LASER-WITH HLL performed by Jessica Cobb MD at [facility] / Leanna Beard / Gray Vic Left 03/05/2019    CYSTOSCOPY STENT INSERTION WITH BASKET EXTRACTION OF STONE TO BLADDER performed by Jessica Cobb MD at [address] SURGERY Left 10/11/2016    Resection infected non-union Lt.  Great toe; insertion antibx bone cement plug    FOOT SURGERY Left 12/21/2016    total arthroplasty with implant big toe    GASTRIC BYPASS SURGERY  2020    HYSTERECTOMY, TOTAL ABDOMINAL  1991    KNEE ARTHROSCOPY Right 2017    MS COLON CA SCRN NOT HI RSK IND N/A 11/13/2017    COLONOSCOPY performed by Satnam Galarza MD at [address] [facility] OFFICE/OUTPT [facility] Left 04/11/2018    FOOT BONE EXCISION-3RD TOE performed by Randy Sykes DPM at [address] Right 01/31/2018    TOE AMPUTATION performed by Randy Sykes DPM at [facility] [address] EXTEN/COMPLIC Right 78/52/8108    FOOT FLAP CLOSURE-DELAYED PRIMARY    AND DEBRIDEMENT OF THIRD TOE LEFT FOOT performed by Randy Sykes DPM at [address] [facility] Right 01/31/2018    Dr Zoie Mcduffie- Right big toe    UMBILICAL HERNIA REPAIR  2011    URETER STENT PLACEMENT Left        FAMILY HISTORY    Family History   Problem Relation Age of Onset    Allergies Mother     High Blood Pressure Mother     Heart Attack Mother     Arthritis Mother     Allergies Father     Heart Disease Father     Diabetes Father        SOCIAL HISTORY    Social History     Socioeconomic History    Marital status:      Spouse name: Not on file    Number of children: Not on file    Years of education: Not on file    Highest education level: Not on file   Occupational History    Not on file   Social Needs    Financial resource strain: Not hard at all   LxDATA-Radames insecurity     Worry: Never true Inability: Never true    Transportation needs     Medical: No     Non-medical: No   Tobacco Use    Smoking status: Never Smoker    Smokeless tobacco: Never Used   Substance and Sexual Activity    Alcohol use: No     Alcohol/week: 0.0 standard drinks    Drug use: No    Sexual activity: Yes     Partners: Male   Lifestyle    Physical activity     Days per week: Patient refused     Minutes per session: Patient refused    Stress: Patient refused   Relationships    Social connections     Talks on phone: Patient refused     Gets together: Patient refused     Attends [employer] service: Patient refused     Active member of club or organization: Patient refused     Attends meetings of clubs or organizations: Patient refused     Relationship status: Patient refused    Intimate partner violence     Fear of current or ex partner: Patient refused     Emotionally abused: Patient refused     Physically abused: Patient refused     Forced sexual activity: Patient refused   Other Topics Concern    Not on file   Social History Narrative    Not on file         REVIEW OF SYSTEMS    Review of Systems:       Negative for: fever/chills, headache, visual changes, chest pain, shortness of breath, nausea/vomiting/diarrhea, bruising and weakness,claudication , DVT, PE, Smoking                                           Positive for surgical weight loss  ; > 100lbs                                                              MGUS                                                              Previous DM                                                              DM LOPS                                                              CKD                                                             RLS                                                              Joint replacement  ; foot                                                              Partial digital amputations , foot     Multidisciplinary assessment by members of the wound care team is carried out, including vitals and check of review of systems and accepted. PE: VSS, Afebrile, NAD A&O x 4         Ambulatory  ; bipedal , plantigrade, propulsive         RLE / LLE ; +2/4 pedal pulses / +CFT , < 3 sec                    RLE ; neuro -- decreased Epicritic 2-pt & light touch dorsal and plantar toes and plantar forefoot                             Skin -- FT ulcerative skin deficit PIPJ 3rd toe  , right                                          Biofilm / slough wound bed                                          Low grade edema and erythema , no warmth                              MS ; R3 toe -- contracture HT , adductovarus rotation                                       Size and photo documentation   Physical Exam   Musculoskeletal:        Feet:          Debridement yes  IMP: DM ulceration of toe , FT , Black's 2 - small area of abscess            complicating orthopedic deformity / LOPS           Initiating unacceptable shoe gear            Vascular perfusion acceptable           Social determinants to Health : no              Decision making  / risk complexity ; Moderate           Goal : toe salvage   REC: acute treatment of infection             Long term plan for neuropathic orthopedic deformity   Tx: debridement / culture         Rx X-ray         Rx empiric ATB cycle          Box shoe gear only          Diligent local wound care          Time element ; 35 minutes + addendum of triage exam       Plan:     Treatment Note please see attached Discharge Instructions    Written patient dismissal instructions given to patient and signed by patient or POA.              Electronically signed by Radu Reyes DPM on 5/20/2021 at 8:43 AM    P; see orders / AVS / Rx        RTC 2 weeks       Today's dressing: alginate  Today's orders: see AVS      Radu Reyse  5/4/2021      Addendum : X-ray ; no apparent osseous abnormality     Radu Reyes  5/4/2021  11:59 AM      Addendum: culture ; MRSA-CA                       P; D/c Keflex                            Rx Doxycycline 100 mg BID x 15 days     [facility]  5/11/2021  8:52 AM

## 2021-05-06 LAB
CULTURE: ABNORMAL
DIRECT EXAM: ABNORMAL
DIRECT EXAM: ABNORMAL
Lab: ABNORMAL
SPECIMEN DESCRIPTION: ABNORMAL

## 2021-05-11 DIAGNOSIS — A49.02 INFECTION WITH METHICILLIN-RESISTANT STAPHYLOCOCCUS AUREUS (MRSA): Primary | ICD-10-CM

## 2021-05-11 RX ORDER — DOXYCYCLINE HYCLATE 100 MG
100 TABLET ORAL 2 TIMES DAILY
Qty: 28 TABLET | Refills: 0 | Status: SHIPPED | OUTPATIENT
Start: 2021-05-11 | End: 2021-05-25

## 2021-05-25 ENCOUNTER — OFFICE VISIT (OUTPATIENT)
Dept: PODIATRY | Age: 56
End: 2021-05-25
Payer: COMMERCIAL

## 2021-05-25 VITALS
DIASTOLIC BLOOD PRESSURE: 81 MMHG | BODY MASS INDEX: 30.11 KG/M2 | TEMPERATURE: 97.1 F | HEART RATE: 60 BPM | RESPIRATION RATE: 16 BRPM | SYSTOLIC BLOOD PRESSURE: 135 MMHG | HEIGHT: 68 IN

## 2021-05-25 DIAGNOSIS — M20.5X1 CLAWTOE, ACQUIRED, RIGHT: ICD-10-CM

## 2021-05-25 DIAGNOSIS — A49.02 INFECTION WITH METHICILLIN-RESISTANT STAPHYLOCOCCUS AUREUS (MRSA): ICD-10-CM

## 2021-05-25 DIAGNOSIS — E08.621 DIABETIC ULCER OF TOE ASSOCIATED WITH DIABETES MELLITUS DUE TO UNDERLYING CONDITION, WITH FAT LAYER EXPOSED, UNSPECIFIED LATERALITY (HCC): Primary | ICD-10-CM

## 2021-05-25 DIAGNOSIS — L97.502 DIABETIC ULCER OF TOE ASSOCIATED WITH DIABETES MELLITUS DUE TO UNDERLYING CONDITION, WITH FAT LAYER EXPOSED, UNSPECIFIED LATERALITY (HCC): Primary | ICD-10-CM

## 2021-05-25 DIAGNOSIS — E08.42 DIABETIC POLYNEUROPATHY ASSOCIATED WITH DIABETES MELLITUS DUE TO UNDERLYING CONDITION (HCC): ICD-10-CM

## 2021-05-25 PROCEDURE — 11042 DBRDMT SUBQ TIS 1ST 20SQCM/<: CPT | Performed by: PODIATRIST

## 2021-05-25 RX ORDER — PANTOPRAZOLE SODIUM 40 MG/1
TABLET, DELAYED RELEASE ORAL
COMMUNITY
Start: 2021-05-12

## 2021-05-25 RX ORDER — DOXYCYCLINE HYCLATE 100 MG
100 TABLET ORAL 2 TIMES DAILY
Qty: 20 TABLET | Refills: 0 | Status: SHIPPED | OUTPATIENT
Start: 2021-05-25 | End: 2021-06-04

## 2021-05-25 ASSESSMENT — PATIENT HEALTH QUESTIONNAIRE - PHQ9
SUM OF ALL RESPONSES TO PHQ QUESTIONS 1-9: 0
SUM OF ALL RESPONSES TO PHQ9 QUESTIONS 1 & 2: 0
1. LITTLE INTEREST OR PLEASURE IN DOING THINGS: 0
SUM OF ALL RESPONSES TO PHQ QUESTIONS 1-9: 0
SUM OF ALL RESPONSES TO PHQ QUESTIONS 1-9: 0
2. FEELING DOWN, DEPRESSED OR HOPELESS: 0

## 2021-05-25 NOTE — PROGRESS NOTES
Jackeline [address]   Progress Note and Procedure Note      [facility] RECORD NUMBER:  [id number]  AGE: 63 y.o.    GENDER: female  : 1965  EPISODE DATE:  2021    Subjective:     Chief Complaint   Patient presents with    Follow-up     right 3rd toe/left 2nd toe         HISTORY of PRESENT ILLNESS HPI     Yulissa Henao is a 63 y.o. female who presents today for wound/ulcer evaluation and treatment  History of Wound Context: 2nd visit in treatment cycle                                              Hx triage work up ; culture / Giorgio Aloe / Rx local wound care                                               Shoe gear change   Wound/Ulcer Pain Timing/Severity: none  Quality of pain: N/A  Severity:  0 / 10   Modifying Factors: None  Associated Signs/Symptoms: drainage, numbness and tingling    Ulcer Identification:  Ulcer Type: diabetic and neuropathic  Contributing Factors: diabetes, chronic pressure, shear force and orthopedic deformity     Wound: Blister        PAST MEDICAL HISTORY        Diagnosis Date    Endometriosis     Headache(784.0)     Hyperlipidemia     Hypertension     MRSA (methicillin resistant staph aureus) culture positive 2018    Obesity     Restless legs syndrome     Type 2 diabetes mellitus ([facility] [address]        PAST SURGICAL HISTORY    Past Surgical History:   Procedure Laterality Date    APPENDECTOMY      BUNIONECTOMY Left 2016   [facility];     COLONOSCOPY  2017    Dr. Radha Dorman prep-repeat 3-5 years with 2 day prep)    CYSTOSCOPY Left 2019    CYSTOSCOPY URETEROSCOPY Alannah Comanche performed by Sheryl Jiménez MD at [facility] / Farhat Raza / STONE Left 2019    CYSTOSCOPY STENT INSERTION WITH BASKET EXTRACTION OF STONE TO BLADDER performed by Sheryl Jiménez MD at [facility] Left 10/11/2016    Resection infected non-union Lt.  Great toe; insertion antibx bone cement plug    FOOT SURGERY Left 12/21/2016    total arthroplasty with implant big toe    GASTRIC BYPASS SURGERY  2020    HYSTERECTOMY, TOTAL ABDOMINAL  1991    KNEE ARTHROSCOPY Right 2017    OK COLON CA SCRN NOT HI RSK IND N/A 11/13/2017    COLONOSCOPY performed by Samson Guerrier MD at [facility] OFF[facility]OUTPT [facility] Left 04/11/2018    FOOT BONE EXCISION-3RD TOE performed by Erlin Cornell DPM at [facility] Right 01/31/2018    TOE AMPUTATION performed by Erlin Cornell DPM at [facility] [address] EXTEN/COMPLIC Right 56/35/3060    FOOT FLAP CLOSURE-DELAYED PRIMARY    AND DEBRIDEMENT OF THIRD TOE LEFT FOOT performed by Erlin Cornell DPM at [address] [place] [address] Right 01/31/2018    Dr Estelle Loera- Right big toe    UMBILICAL HERNIA REPAIR  2011    URETER STENT PLACEMENT Left        FAMILY HISTORY    Family History   Problem Relation Age of Onset    Allergies Mother     High Blood Pressure Mother     Heart Attack Mother     Arthritis Mother     Allergies Father     Heart Disease Father     Diabetes Father        SOCIAL HISTORY    Social History     Tobacco Use    Smoking status: Never Smoker    Smokeless tobacco: Never Used   Vaping Use    Vaping Use: Never used   Substance Use Topics    Alcohol use: No     Alcohol/week: 0.0 standard drinks    Drug use: No       ALLERGIES    Allergies   Allergen Reactions    Nsaids      Too harsh for stomach       MEDICATIONS    Current Outpatient Medications on File Prior to Visit   Medication Sig Dispense Refill    pantoprazole (PROTONIX) 40 MG tablet       metoprolol succinate (TOPROL XL) 50 MG extended release tablet Take 1 tablet by mouth 2 times daily 180 tablet 2    doxycycline hyclate (VIBRA-TABS) 100 MG tablet Take 1 tablet by mouth 2 times daily for 14 days 28 tablet 0    NONFORMULARY Take by mouth daily Bariatric vitamin      allopurinol (ZYLOPRIM) 100 MG tablet Take 1 tablet by mouth daily 90 tablet 2    gabapentin (NEURONTIN) 300 MG capsule 300 mg. 1 tab qam  2 tabs at noon  2 tabs qhs      clotrimazole-betamethasone (LOTRISONE) 1-0.05 % cream Apply topically 1 time daily as needed 1 Tube 1    ASPIRIN ADULT LOW STRENGTH 81 MG EC tablet Take 1 tablet by mouth daily      DULoxetine (CYMBALTA) 60 MG extended release capsule Take 60 mg by mouth daily      ONE TOUCH ULTRA TEST strip 1 each daily       metFORMIN ER (GLUCOPHAGE-XR) 500 MG XR tablet Take 1,000 mg by mouth 2 times daily       CLICKFINE PEN NEEDLES 31G X 8 MM MISC 1 each daily        No current facility-administered medications on file prior to visit.        REVIEW OF SYSTEMS    Gastrointestinal: negative, +gastric bypass   Genitourinary:positive for CKD  Musculoskeletal:positive for digital sx and amputations  Neurological: positive for paresthesia  Endocrine: positive for diabetic symptoms including foot ulcerations and neuropathy    Objective:      /81   Pulse 60   Temp 97.1 °F (36.2 °C) (Tympanic)   Resp 16   Ht 5' 8\" (1.727 m)   BMI 30.11 kg/m²     Wt Readings from Last 3 Encounters:   02/22/21 198 lb (89.8 kg)   10/12/20 256 lb 6.4 oz (116.3 kg)   08/21/20 269 lb (122 kg)       PHYSICAL EXAM    General Appearance: alert and oriented to person, place and time, well-developed and well-nourished, in no acute distress  VSS, Afebrile  Cardiovascular: intact distal pulses and +CFT, < 3 sec R3 toe   Musculoskeletal: partial hallux amputation , R1   Neurologic: vibratory sensation normal, Babinski sign negative and sensory deficit present- diminished Epicritic @-pt & light touch of toes : dorsal and plantar   Skin : R3 toe  -- residual FT ulceration @ PIPJ -- with biofilm                                                                                  TWV decreased 20-25%                             Adductovarus rotated contracture  Labs: Labs: culture ; MRSA ; hx Doxycycline Debrided: 100%    Total Surface Area Debrided:  1.25 sq cm       Diabetic/Pressure/Non Pressure Ulcers only:  Ulcer: Diabetic ulcer, fat layer exposed      Estimated Blood Loss:  Minimal    Hemostasis Achieved:  by pressure    Procedural Pain:  0  / 10     Post Procedural Pain:  0 / 10     Response to treatment:  Well tolerated by patient. Plan:   Therapy plan update   Rx Additional Doxycycline  Rx Bactroban ointment   Treatment Note please see attached Discharge Instructions    Written patient dismissal instructions given to patient and signed by patient or POA.              Electronically signed by Barbara Mchugh DPM on 5/25/2021 at 10:15 AM

## 2021-05-25 NOTE — PATIENT INSTRUCTIONS
PODIATRY PATIENT DISCHARGE INSTRUCTIONS    CALL [phone] regarding podiatry questions    OFFICE HOURS ARE TUESDAY MORNING  8:30-NOON and can change with out notice    Discharge instructions for patient's plan of care:    Right 3rd toe/left 2nd toe  Apply Bactroban ointment to areas twice a day and cover with sock or bandaide. Also apply bactroban ointment to nares twice a day. Doxycycline 100 mg 1 tablet twice a day till finished. Return to clinic Tuesday 6/8/21 at 8:30AM          We hope we gave you VERY GOOD care today!

## 2021-06-08 ENCOUNTER — OFFICE VISIT (OUTPATIENT)
Dept: PODIATRY | Age: 56
End: 2021-06-08
Payer: COMMERCIAL

## 2021-06-08 VITALS
HEART RATE: 59 BPM | TEMPERATURE: 97.4 F | RESPIRATION RATE: 16 BRPM | HEIGHT: 68 IN | SYSTOLIC BLOOD PRESSURE: 134 MMHG | DIASTOLIC BLOOD PRESSURE: 86 MMHG | BODY MASS INDEX: 30.11 KG/M2

## 2021-06-08 DIAGNOSIS — E08.621 DIABETIC ULCER OF TOE ASSOCIATED WITH DIABETES MELLITUS DUE TO UNDERLYING CONDITION, WITH FAT LAYER EXPOSED, UNSPECIFIED LATERALITY (HCC): Primary | ICD-10-CM

## 2021-06-08 DIAGNOSIS — M20.61 DEFORMITY OF TOE OF RIGHT FOOT: ICD-10-CM

## 2021-06-08 DIAGNOSIS — L97.502 DIABETIC ULCER OF TOE ASSOCIATED WITH DIABETES MELLITUS DUE TO UNDERLYING CONDITION, WITH FAT LAYER EXPOSED, UNSPECIFIED LATERALITY (HCC): Primary | ICD-10-CM

## 2021-06-08 PROCEDURE — 99213 OFFICE O/P EST LOW 20 MIN: CPT | Performed by: PODIATRIST

## 2021-06-08 RX ORDER — ERGOCALCIFEROL 1.25 MG/1
CAPSULE ORAL
COMMUNITY
Start: 2021-05-25

## 2021-06-08 NOTE — PATIENT INSTRUCTIONS
PODIATRY PATIENT DISCHARGE INSTRUCTIONS    CALL [phone] regarding podiatry questions    OFFICE HOURS ARE TUESDAY MORNING  8:30-NOON and can change with out notice    Discharge instructions for patient's plan of care:  Left 2nd toe/right 3rd toe  Continue with bactroban ointment to areas twice a day, and also apply to nares. Xray today of right foot  Next Appointment: Tuesday 7/13/21 at 8:30AM      We hope we gave you VERY GOOD care today!

## 2021-06-08 NOTE — PROGRESS NOTES
Hollie Dennis (:  1965) is a 63 y.o. female,Established patient, here for evaluation of the following chief complaint(s):  Follow-up (left 2nd toe/right 3rd toe)  Cc: 3rd toe right   HPI: 2nd visit in this treatment cycle          Pressure ulcer management ; local care / off laoding           MRR: culture                     X-ray            DDx: HT / Luciano Sleight , 3rd toe right                     Complicating LOPS       ASSESSMENT/PLAN:     Initial wound care  F/U surgical plan ; when healed  Complicating ortho deformity  / LOPS   Social Determinants to health  ; NO  Decision making / risk complexity : moderate     Subjective   SUBJECTIVE/OBJECTIVE:  HPI : see above     Review of Systems     DM LOPS  Hx amputation   Charcot PVP    Objective   Physical Exam     VSS, Afebrile, NAD, A&Ox 3  Weight loss , > 100 lbs , 1 year  Ambulatory ; bipedal , plantigrade and propulsive in \"mule\"  R3 toe ; minimal residual open skin wound deficit ; PT @ PIPJ                 Semi rigid HT / adductovarus ; appex PIPJ                 Mild edema / no erythema / no warmth                Photo documentation  P: repeat X-ray                          On this date 2021 I have spent 25 minutes reviewing previous notes, test results and face to face with the patient discussing the diagnosis and importance of compliance with the treatment plan as well as documenting on the day of the visit. An electronic signature was used to authenticate this note.     --Naty Glover DPM

## 2021-07-09 ENCOUNTER — HOSPITAL ENCOUNTER (OUTPATIENT)
Dept: GENERAL RADIOLOGY | Age: 56
Discharge: HOME | End: 2021-07-11
Payer: COMMERCIAL

## 2021-07-09 ENCOUNTER — HOSPITAL ENCOUNTER (OUTPATIENT)
Age: 56
Discharge: HOME | End: 2021-07-11
Payer: COMMERCIAL

## 2021-07-09 DIAGNOSIS — L97.502 DIABETIC ULCER OF TOE ASSOCIATED WITH DIABETES MELLITUS DUE TO UNDERLYING CONDITION, WITH FAT LAYER EXPOSED, UNSPECIFIED LATERALITY (HCC): ICD-10-CM

## 2021-07-09 DIAGNOSIS — E08.621 DIABETIC ULCER OF TOE ASSOCIATED WITH DIABETES MELLITUS DUE TO UNDERLYING CONDITION, WITH FAT LAYER EXPOSED, UNSPECIFIED LATERALITY (HCC): ICD-10-CM

## 2021-07-09 DIAGNOSIS — M20.61 DEFORMITY OF TOE OF RIGHT FOOT: ICD-10-CM

## 2021-07-09 PROCEDURE — 73660 X-RAY EXAM OF TOE(S): CPT

## 2021-07-13 ENCOUNTER — OFFICE VISIT (OUTPATIENT)
Dept: PODIATRY | Age: 56
End: 2021-07-13
Payer: COMMERCIAL

## 2021-07-13 VITALS
HEIGHT: 68 IN | HEART RATE: 60 BPM | SYSTOLIC BLOOD PRESSURE: 130 MMHG | BODY MASS INDEX: 30.11 KG/M2 | TEMPERATURE: 96.2 F | DIASTOLIC BLOOD PRESSURE: 77 MMHG | RESPIRATION RATE: 16 BRPM

## 2021-07-13 DIAGNOSIS — M20.61 DEFORMITY OF TOE OF RIGHT FOOT: ICD-10-CM

## 2021-07-13 DIAGNOSIS — E08.621 DIABETIC ULCER OF TOE ASSOCIATED WITH DIABETES MELLITUS DUE TO UNDERLYING CONDITION, WITH FAT LAYER EXPOSED, UNSPECIFIED LATERALITY (HCC): Primary | ICD-10-CM

## 2021-07-13 DIAGNOSIS — L97.502 DIABETIC ULCER OF TOE ASSOCIATED WITH DIABETES MELLITUS DUE TO UNDERLYING CONDITION, WITH FAT LAYER EXPOSED, UNSPECIFIED LATERALITY (HCC): Primary | ICD-10-CM

## 2021-07-13 PROCEDURE — 99214 OFFICE O/P EST MOD 30 MIN: CPT | Performed by: PODIATRIST

## 2021-07-13 ASSESSMENT — PATIENT HEALTH QUESTIONNAIRE - PHQ9
SUM OF ALL RESPONSES TO PHQ QUESTIONS 1-9: 0
SUM OF ALL RESPONSES TO PHQ9 QUESTIONS 1 & 2: 0
SUM OF ALL RESPONSES TO PHQ QUESTIONS 1-9: 0
1. LITTLE INTEREST OR PLEASURE IN DOING THINGS: 0
2. FEELING DOWN, DEPRESSED OR HOPELESS: 0
SUM OF ALL RESPONSES TO PHQ QUESTIONS 1-9: 0

## 2021-07-13 NOTE — PATIENT INSTRUCTIONS
PODIATRY PATIENT DISCHARGE INSTRUCTIONS    CALL [phone] regarding podiatry questions    OFFICE HOURS ARE TUESDAY MORNING  8:30-NOON and can change with out notice    Discharge instructions for patient's plan of care:  Left 2nd toe/right 3rd toe    Dr. Yasmani Tripathi to call with date and time of surgery. Continue to apply bactroban ointment and bandaid to toes      We hope we gave you VERY GOOD care today!

## 2021-07-13 NOTE — PROGRESS NOTES
Subjective      Roxana Valentin is a 61 y.o. female who presents for E, M, & Tx ; possible pre surgical.  She  has  Wound(s) which are/is located on the  toes right, 3rd toe. Cc: R3 toe  HPI: previous acute - aggressive treatment of abscess          X-ray evaluation          DDx : clawtoe R3           MRR: complicating LOPS          PAST MEDICAL HISTORY     has a past medical history of Endometriosis, Headache(784.0), Hyperlipidemia, Hypertension, MRSA (methicillin resistant staph aureus) culture positive, Obesity, Restless legs syndrome, and Type 2 diabetes mellitus ([facility] [address] MEDICATIONS    Current Outpatient Medications   Medication Sig Dispense Refill    vitamin D (ERGOCALCIFEROL) 1.25 MG (77465 UT) CAPS capsule       pantoprazole (PROTONIX) 40 MG tablet       metoprolol succinate (TOPROL XL) 50 MG extended release tablet Take 1 tablet by mouth 2 times daily 180 tablet 2    NONFORMULARY Take by mouth daily Bariatric vitamin      allopurinol (ZYLOPRIM) 100 MG tablet Take 1 tablet by mouth daily 90 tablet 2    gabapentin (NEURONTIN) 300 MG capsule 300 mg. 1 tab qam  2 tabs at noon  2 tabs qhs      clotrimazole-betamethasone (LOTRISONE) 1-0.05 % cream Apply topically 1 time daily as needed 1 Tube 1    ASPIRIN ADULT LOW STRENGTH 81 MG EC tablet Take 1 tablet by mouth daily      DULoxetine (CYMBALTA) 60 MG extended release capsule Take 60 mg by mouth daily      ONE TOUCH ULTRA TEST strip 1 each daily       metFORMIN ER (GLUCOPHAGE-XR) 500 MG XR tablet Take 1,000 mg by mouth 2 times daily       CLICKFINE PEN NEEDLES 31G X 8 MM MISC 1 each daily        No current facility-administered medications for this visit.        ALLERGIES    Allergies   Allergen Reactions    Nsaids      Too harsh for stomach       PAST SURGICAL HISTORY    Past Surgical History:   Procedure Laterality Date    APPENDECTOMY  1989    BUNIONECTOMY Left 08/26/2016   [facility]; 1990    COLONOSCOPY  11/13/2017     [place]-hemorroids,(poor prep-repeat 3-5 years with 2 day prep)    CYSTOSCOPY Left 03/05/2019    CYSTOSCOPY URETEROSCOPY LASER-WITH HLL performed by Rosalio Naranjo MD at [address] / [address] / Katina Albarran Left 03/05/2019    CYSTOSCOPY STENT INSERTION WITH BASKET EXTRACTION OF STONE TO BLADDER performed by Rosalio Naranjo MD at [address] SURGERY Left 10/11/2016    Resection infected non-union Lt.  Great toe; insertion antibx bone cement plug    FOOT SURGERY Left 12/21/2016    total arthroplasty with implant big toe    GASTRIC BYPASS SURGERY  2020    HYSTERECTOMY, TOTAL ABDOMINAL  1991    KNEE ARTHROSCOPY Right 2017    ND COLON CA SCRN NOT HI RSK IND N/A 11/13/2017    COLONOSCOPY performed by Bonnie Brewster MD at [address] [facility] [address] OFFICE/OUTPT [facility] Left 04/11/2018    FOOT BONE EXCISION-3RD TOE performed by Claudine Cordon DPM at [address] [facility] Right 01/31/2018    TOE AMPUTATION performed by Claudine Cordon DPM at [facility] [address] EXTEN/COMPLIC Right 83/22/5546    FOOT FLAP CLOSURE-DELAYED PRIMARY    AND DEBRIDEMENT OF THIRD TOE LEFT FOOT performed by Claudine Cordon DPM at [address] [facility] [place], operated by [facility] Right 01/31/2018    Dr Valerie Sanchez- Right big toe    UMBILICAL HERNIA REPAIR  2011    URETER STENT PLACEMENT Left        FAMILY HISTORY    Family History   Problem Relation Age of Onset    Allergies Mother     High Blood Pressure Mother     Heart Attack Mother     Arthritis Mother     Allergies Father     Heart Disease Father     Diabetes Father        SOCIAL HISTORY    Social History     Socioeconomic History    Marital status:      Spouse name: Not on file    Number of children: Not on file    Years of education: Not on file    Highest education level: Not on file   Occupational History    Not on file   Tobacco Use    Smoking status: Never Smoker    Smokeless tobacco: Never Used   Vaping Use    Vaping Use: Never used   Substance and Sexual Activity    Alcohol use: No     Alcohol/week: 0.0 standard drinks    Drug use: No    Sexual activity: Yes     Partners: Male   Other Topics Concern    Not on file   Social History Narrative    Not on file     Social Determinants of Health     Financial Resource Strain: Low Risk     Difficulty of Paying Living Expenses: Not hard at all   Food Insecurity: No Food Insecurity    Worried About Running Out of Food in the Last Year: Never true    Lisa of Food in the Last Year: Never true   Transportation Needs: No Transportation Needs    Lack of Transportation (Medical): No    Lack of Transportation (Non-Medical):  No   Physical Activity: Unknown    Days of Exercise per Week: Patient refused    Minutes of Exercise per Session: Patient refused   Stress: Unknown    Feeling of Stress : Patient refused   Social Connections: Unknown    Frequency of Communication with Friends and Family: Patient refused    Frequency of Social Gatherings with Friends and Family: Patient refused    Attends Zoroastrian Services: Patient refused    Active Member of Clubs or Organizations: Patient refused    Attends Club or Organization Meetings: Patient refused    Marital Status: Patient refused   Intimate Partner Violence: Unknown    Fear of Current or Ex-Partner: Patient refused    Emotionally Abused: Patient refused    Physically Abused: Patient refused    Sexually Abused: Patient refused         REVIEW OF SYSTEMS    Review of Systems:       Negative for: fever/chills, headache, visual changes, chest pain, shortness of breath, nausea/vomiting/diarrhea, bruising, rash and weakness                                          Positive for clawtoe deformity of toe ; R3 ; ulcerative wound dorsal                                                             DM LOPS hs                                                             Recent weight loss ; surgical                                                              Recent hernia Sx     Multidisciplinary assessment by members of the wound care team is carried out, including vitals and check of review of systems and accepted. PE: VSS, Afebrile, NAD, A&O x 3,          Ambulatory ;  Bipedal , plantigrade and propulsive          R3 toe ; minimal skin PIPJ skin deficit                        Semi rigid HT / clawtoe                        Low grade edema                        No PMT on direct exam     Physical Exam   Musculoskeletal:        Feet:               Debridement no  IMP: DM LOPS HT-Clawtoe ulceration ; healed -- R3            Orthopedic deformity in LOPS foot           +vascular perfusion           Hx previous toe amputation(s)   REC: surgical HT correction R3   Tx: pre surgical work up / checklist         Scheduling         Counseling and coordination of surgical algorithm ; informed consent discussion         Time element ' 35 minutes  P: toe surgery ; MTH 3rd Tuesday August               Today's dressing: foam  Today's orders: see ANASTASIA Tavarez DPM  7/13/2021  10:01 AM

## 2021-08-10 ENCOUNTER — HOSPITAL ENCOUNTER (OUTPATIENT)
Age: 56
Discharge: HOME | End: 2021-08-10
Payer: COMMERCIAL

## 2021-08-10 LAB
ABSOLUTE EOS #: 0.42 K/UL (ref 0–0.44)
ABSOLUTE IMMATURE GRANULOCYTE: <0.03 K/UL (ref 0–0.3)
ABSOLUTE LYMPH #: 1.92 K/UL (ref 1.1–3.7)
ABSOLUTE MONO #: 0.54 K/UL (ref 0.1–1.2)
ALBUMIN SERPL-MCNC: 4.4 G/DL (ref 3.5–5.2)
ALBUMIN/GLOBULIN RATIO: 2.1 (ref 1–2.5)
ALP BLD-CCNC: 89 U/L (ref 35–104)
ALT SERPL-CCNC: 11 U/L (ref 5–33)
ANION GAP SERPL CALCULATED.3IONS-SCNC: 12 MMOL/L (ref 9–17)
AST SERPL-CCNC: 17 U/L
BASOPHILS # BLD: 1 % (ref 0–2)
BASOPHILS ABSOLUTE: 0.06 K/UL (ref 0–0.2)
BILIRUB SERPL-MCNC: 0.65 MG/DL (ref 0.3–1.2)
BUN BLDV-MCNC: 33 MG/DL (ref 6–20)
BUN/CREAT BLD: 35 (ref 9–20)
CALCIUM SERPL-MCNC: 9.6 MG/DL (ref 8.6–10.4)
CHLORIDE BLD-SCNC: 100 MMOL/L (ref 98–107)
CO2: 28 MMOL/L (ref 20–31)
CREAT SERPL-MCNC: 0.94 MG/DL (ref 0.5–0.9)
DIFFERENTIAL TYPE: ABNORMAL
EOSINOPHILS RELATIVE PERCENT: 7 % (ref 1–4)
FERRITIN: 159 UG/L (ref 13–150)
FREE KAPPA/LAMBDA RATIO: 1.01 (ref 0.26–1.65)
GFR AFRICAN AMERICAN: >60 ML/MIN
GFR NON-AFRICAN AMERICAN: >60 ML/MIN
GFR SERPL CREATININE-BSD FRML MDRD: ABNORMAL ML/MIN/{1.73_M2}
GFR SERPL CREATININE-BSD FRML MDRD: ABNORMAL ML/MIN/{1.73_M2}
GLUCOSE BLD-MCNC: 119 MG/DL (ref 70–99)
HCT VFR BLD CALC: 40.3 % (ref 36.3–47.1)
HEMOGLOBIN: 12.6 G/DL (ref 11.9–15.1)
IGA: 118 MG/DL (ref 70–400)
IGG: 655 MG/DL (ref 700–1600)
IGM: <25 MG/DL (ref 40–230)
IMMATURE GRANULOCYTES: 0 %
IRON SATURATION: 35 % (ref 20–55)
IRON: 86 UG/DL (ref 37–145)
KAPPA FREE LIGHT CHAINS QNT: 1.8 MG/DL (ref 0.37–1.94)
LAMBDA FREE LIGHT CHAINS QNT: 1.79 MG/DL (ref 0.57–2.63)
LYMPHOCYTES # BLD: 31 % (ref 24–43)
MCH RBC QN AUTO: 29.6 PG (ref 25.2–33.5)
MCHC RBC AUTO-ENTMCNC: 31.3 G/DL (ref 28.4–34.8)
MCV RBC AUTO: 94.8 FL (ref 82.6–102.9)
MONOCYTES # BLD: 9 % (ref 3–12)
NRBC AUTOMATED: 0 PER 100 WBC
PDW BLD-RTO: 13.1 % (ref 11.8–14.4)
PLATELET # BLD: 274 K/UL (ref 138–453)
PLATELET ESTIMATE: ABNORMAL
PMV BLD AUTO: 10.8 FL (ref 8.1–13.5)
POTASSIUM SERPL-SCNC: 4.5 MMOL/L (ref 3.7–5.3)
RBC # BLD: 4.25 M/UL (ref 3.95–5.11)
RBC # BLD: ABNORMAL 10*6/UL
SEG NEUTROPHILS: 52 % (ref 36–65)
SEGMENTED NEUTROPHILS ABSOLUTE COUNT: 3.23 K/UL (ref 1.5–8.1)
SODIUM BLD-SCNC: 140 MMOL/L (ref 135–144)
TOTAL IRON BINDING CAPACITY: 246 UG/DL (ref 250–450)
TOTAL PROTEIN: 6.5 G/DL (ref 6.4–8.3)
UNSATURATED IRON BINDING CAPACITY: 160 UG/DL (ref 112–347)
VITAMIN D 25-HYDROXY: 71.4 NG/ML (ref 30–100)
WBC # BLD: 6.2 K/UL (ref 3.5–11.3)
WBC # BLD: ABNORMAL 10*3/UL

## 2021-08-10 PROCEDURE — 86334 IMMUNOFIX E-PHORESIS SERUM: CPT

## 2021-08-10 PROCEDURE — 80053 COMPREHEN METABOLIC PANEL: CPT

## 2021-08-10 PROCEDURE — 83550 IRON BINDING TEST: CPT

## 2021-08-10 PROCEDURE — 82728 ASSAY OF FERRITIN: CPT

## 2021-08-10 PROCEDURE — 83883 ASSAY NEPHELOMETRY NOT SPEC: CPT

## 2021-08-10 PROCEDURE — 84165 PROTEIN E-PHORESIS SERUM: CPT

## 2021-08-10 PROCEDURE — 83540 ASSAY OF IRON: CPT

## 2021-08-10 PROCEDURE — 84155 ASSAY OF PROTEIN SERUM: CPT

## 2021-08-10 PROCEDURE — 82784 ASSAY IGA/IGD/IGG/IGM EACH: CPT

## 2021-08-10 PROCEDURE — 36415 COLL VENOUS BLD VENIPUNCTURE: CPT

## 2021-08-10 PROCEDURE — 82232 ASSAY OF BETA-2 PROTEIN: CPT

## 2021-08-10 PROCEDURE — 82306 VITAMIN D 25 HYDROXY: CPT

## 2021-08-10 PROCEDURE — 85025 COMPLETE CBC W/AUTO DIFF WBC: CPT

## 2021-08-11 LAB
ALBUMIN (CALCULATED): 4.1 G/DL (ref 3.2–5.2)
ALBUMIN PERCENT: 68 % (ref 45–65)
ALPHA 1 PERCENT: 3 % (ref 3–6)
ALPHA 2 PERCENT: 11 % (ref 6–13)
ALPHA-1-GLOBULIN: 0.2 G/DL (ref 0.1–0.4)
ALPHA-2-GLOBULIN: 0.7 G/DL (ref 0.5–0.9)
BETA GLOBULIN: 0.6 G/DL (ref 0.5–1.1)
BETA PERCENT: 10 % (ref 11–19)
BETA-2 MICROGLOBULIN: 3.1 MG/L (ref 0.6–2.4)
GAMMA GLOBULIN %: 9 % (ref 9–20)
GAMMA GLOBULIN: 0.5 G/DL (ref 0.5–1.5)
PATHOLOGIST: ABNORMAL
PATHOLOGIST: NORMAL
PROTEIN ELECTROPHORESIS, SERUM: ABNORMAL
SERUM IFX INTERP: NORMAL
TOTAL PROT. SUM,%: 101 % (ref 98–102)
TOTAL PROT. SUM: 6.1 G/DL (ref 6.3–8.2)
TOTAL PROTEIN: 6 G/DL (ref 6.4–8.3)

## 2021-08-18 NOTE — TELEPHONE ENCOUNTER
Spoke with Celso Ko and let her know her stress test was okay. She verbalized understanding. She was wondering if you had the result from the echocardiogram that she also had done yet? Thank you! oriented to person, place and time , normal sensation , short and long term memory intact

## 2021-08-27 PROBLEM — M86.171 OSTEOMYELITIS OF ANKLE OR FOOT, ACUTE, RIGHT (HCC): Status: RESOLVED | Noted: 2018-01-30 | Resolved: 2021-08-27

## 2021-08-27 PROBLEM — Z47.89 ORTHOPEDIC AFTERCARE: Status: RESOLVED | Noted: 2018-08-23 | Resolved: 2021-08-27

## 2021-08-27 PROBLEM — R09.02 HYPOXIA: Status: RESOLVED | Noted: 2018-02-01 | Resolved: 2021-08-27

## 2021-08-27 PROBLEM — N18.9 CKD (CHRONIC KIDNEY DISEASE): Chronic | Status: RESOLVED | Noted: 2018-02-01 | Resolved: 2021-08-27

## 2021-11-15 ENCOUNTER — HOSPITAL ENCOUNTER (OUTPATIENT)
Age: 56
Discharge: HOME | End: 2021-11-15
Payer: COMMERCIAL

## 2021-11-15 LAB
ABSOLUTE EOS #: 0.62 K/UL (ref 0–0.44)
ABSOLUTE IMMATURE GRANULOCYTE: <0.03 K/UL (ref 0–0.3)
ABSOLUTE LYMPH #: 2.18 K/UL (ref 1.1–3.7)
ABSOLUTE MONO #: 0.75 K/UL (ref 0.1–1.2)
ALBUMIN SERPL-MCNC: 4.2 G/DL (ref 3.5–5.2)
ALBUMIN/GLOBULIN RATIO: 1.8 (ref 1–2.5)
ALP BLD-CCNC: 101 U/L (ref 35–104)
ALT SERPL-CCNC: 17 U/L (ref 5–33)
ANION GAP SERPL CALCULATED.3IONS-SCNC: 9 MMOL/L (ref 9–17)
AST SERPL-CCNC: 17 U/L
BASOPHILS # BLD: 1 % (ref 0–2)
BASOPHILS ABSOLUTE: 0.07 K/UL (ref 0–0.2)
BILIRUB SERPL-MCNC: 0.53 MG/DL (ref 0.3–1.2)
BUN BLDV-MCNC: 24 MG/DL (ref 6–20)
BUN/CREAT BLD: 29 (ref 9–20)
CALCIUM SERPL-MCNC: 9.6 MG/DL (ref 8.6–10.4)
CHLORIDE BLD-SCNC: 105 MMOL/L (ref 98–107)
CHOLESTEROL/HDL RATIO: 2.7
CHOLESTEROL: 168 MG/DL
CO2: 29 MMOL/L (ref 20–31)
CREAT SERPL-MCNC: 0.84 MG/DL (ref 0.5–0.9)
CREATININE URINE: 179.2 MG/DL (ref 28–217)
DIFFERENTIAL TYPE: ABNORMAL
EOSINOPHILS RELATIVE PERCENT: 7 % (ref 1–4)
ESTIMATED AVERAGE GLUCOSE: 137 MG/DL
FERRITIN: 146 UG/L (ref 13–150)
FOLATE: >20 NG/ML
GFR AFRICAN AMERICAN: >60 ML/MIN
GFR NON-AFRICAN AMERICAN: >60 ML/MIN
GFR SERPL CREATININE-BSD FRML MDRD: ABNORMAL ML/MIN/{1.73_M2}
GFR SERPL CREATININE-BSD FRML MDRD: ABNORMAL ML/MIN/{1.73_M2}
GLUCOSE BLD-MCNC: 146 MG/DL (ref 70–99)
HBA1C MFR BLD: 6.4 % (ref 4–6)
HCT VFR BLD CALC: 41.3 % (ref 36.3–47.1)
HDLC SERPL-MCNC: 63 MG/DL
HEMOGLOBIN: 13.2 G/DL (ref 11.9–15.1)
IMMATURE GRANULOCYTES: 0 %
IRON SATURATION: 30 % (ref 20–55)
IRON: 78 UG/DL (ref 37–145)
LDL CHOLESTEROL: 78 MG/DL (ref 0–130)
LYMPHOCYTES # BLD: 26 % (ref 24–43)
MAGNESIUM: 1.5 MG/DL (ref 1.6–2.6)
MCH RBC QN AUTO: 30.1 PG (ref 25.2–33.5)
MCHC RBC AUTO-ENTMCNC: 32 G/DL (ref 28.4–34.8)
MCV RBC AUTO: 94.3 FL (ref 82.6–102.9)
MICROALBUMIN/CREAT 24H UR: 286 MG/L
MICROALBUMIN/CREAT UR-RTO: 160 MCG/MG CREAT
MONOCYTES # BLD: 9 % (ref 3–12)
NRBC AUTOMATED: 0 PER 100 WBC
PDW BLD-RTO: 11.8 % (ref 11.8–14.4)
PHOSPHORUS: 4.1 MG/DL (ref 2.6–4.5)
PLATELET # BLD: 286 K/UL (ref 138–453)
PLATELET ESTIMATE: ABNORMAL
PMV BLD AUTO: 10.9 FL (ref 8.1–13.5)
POTASSIUM SERPL-SCNC: 4.4 MMOL/L (ref 3.7–5.3)
RBC # BLD: 4.38 M/UL (ref 3.95–5.11)
RBC # BLD: ABNORMAL 10*6/UL
SEG NEUTROPHILS: 57 % (ref 36–65)
SEGMENTED NEUTROPHILS ABSOLUTE COUNT: 4.7 K/UL (ref 1.5–8.1)
SODIUM BLD-SCNC: 143 MMOL/L (ref 135–144)
TOTAL IRON BINDING CAPACITY: 257 UG/DL (ref 250–450)
TOTAL PROTEIN: 6.5 G/DL (ref 6.4–8.3)
TRIGL SERPL-MCNC: 133 MG/DL
TSH SERPL DL<=0.05 MIU/L-ACNC: 0.65 MIU/L (ref 0.3–5)
UNSATURATED IRON BINDING CAPACITY: 179 UG/DL (ref 112–347)
VITAMIN B-12: >2000 PG/ML (ref 232–1245)
VITAMIN D 25-HYDROXY: 56.8 NG/ML (ref 30–100)
VLDLC SERPL CALC-MCNC: NORMAL MG/DL (ref 1–30)
WBC # BLD: 8.3 K/UL (ref 3.5–11.3)
WBC # BLD: ABNORMAL 10*3/UL

## 2021-11-15 PROCEDURE — 82607 VITAMIN B-12: CPT

## 2021-11-15 PROCEDURE — 84100 ASSAY OF PHOSPHORUS: CPT

## 2021-11-15 PROCEDURE — 85025 COMPLETE CBC W/AUTO DIFF WBC: CPT

## 2021-11-15 PROCEDURE — 83550 IRON BINDING TEST: CPT

## 2021-11-15 PROCEDURE — 82746 ASSAY OF FOLIC ACID SERUM: CPT

## 2021-11-15 PROCEDURE — 80061 LIPID PANEL: CPT

## 2021-11-15 PROCEDURE — 82570 ASSAY OF URINE CREATININE: CPT

## 2021-11-15 PROCEDURE — 82306 VITAMIN D 25 HYDROXY: CPT

## 2021-11-15 PROCEDURE — 80053 COMPREHEN METABOLIC PANEL: CPT

## 2021-11-15 PROCEDURE — 84425 ASSAY OF VITAMIN B-1: CPT

## 2021-11-15 PROCEDURE — 83735 ASSAY OF MAGNESIUM: CPT

## 2021-11-15 PROCEDURE — 82043 UR ALBUMIN QUANTITATIVE: CPT

## 2021-11-15 PROCEDURE — 84443 ASSAY THYROID STIM HORMONE: CPT

## 2021-11-15 PROCEDURE — 83540 ASSAY OF IRON: CPT

## 2021-11-15 PROCEDURE — 36415 COLL VENOUS BLD VENIPUNCTURE: CPT

## 2021-11-15 PROCEDURE — 82728 ASSAY OF FERRITIN: CPT

## 2021-11-15 PROCEDURE — 83036 HEMOGLOBIN GLYCOSYLATED A1C: CPT

## 2021-11-18 LAB — VITAMIN B1 WHOLE BLOOD: 243 NMOL/L (ref 70–180)

## 2021-12-17 ENCOUNTER — APPOINTMENT (OUTPATIENT)
Dept: GENERAL RADIOLOGY | Age: 56
End: 2021-12-17
Payer: COMMERCIAL

## 2021-12-17 ENCOUNTER — HOSPITAL ENCOUNTER (EMERGENCY)
Age: 56
Discharge: HOME | End: 2021-12-17
Attending: EMERGENCY MEDICINE
Payer: COMMERCIAL

## 2021-12-17 VITALS
WEIGHT: 149 LBS | OXYGEN SATURATION: 100 % | BODY MASS INDEX: 22.66 KG/M2 | TEMPERATURE: 97.5 F | DIASTOLIC BLOOD PRESSURE: 84 MMHG | RESPIRATION RATE: 16 BRPM | HEART RATE: 86 BPM | SYSTOLIC BLOOD PRESSURE: 181 MMHG

## 2021-12-17 DIAGNOSIS — L03.116 CELLULITIS OF LEFT FOOT: Primary | ICD-10-CM

## 2021-12-17 LAB
ABSOLUTE EOS #: 0.27 K/UL (ref 0–0.44)
ABSOLUTE IMMATURE GRANULOCYTE: <0.03 K/UL (ref 0–0.3)
ABSOLUTE LYMPH #: 2.12 K/UL (ref 1.1–3.7)
ABSOLUTE MONO #: 0.84 K/UL (ref 0.1–1.2)
ALBUMIN SERPL-MCNC: 4 G/DL (ref 3.5–5.2)
ALBUMIN/GLOBULIN RATIO: 1.5 (ref 1–2.5)
ALP BLD-CCNC: 101 U/L (ref 35–104)
ALT SERPL-CCNC: 17 U/L (ref 5–33)
ANION GAP SERPL CALCULATED.3IONS-SCNC: 11 MMOL/L (ref 9–17)
AST SERPL-CCNC: 23 U/L
BASOPHILS # BLD: 1 % (ref 0–2)
BASOPHILS ABSOLUTE: 0.05 K/UL (ref 0–0.2)
BILIRUB SERPL-MCNC: 0.52 MG/DL (ref 0.3–1.2)
BUN BLDV-MCNC: 27 MG/DL (ref 6–20)
BUN/CREAT BLD: 34 (ref 9–20)
CALCIUM SERPL-MCNC: 9.6 MG/DL (ref 8.6–10.4)
CHLORIDE BLD-SCNC: 97 MMOL/L (ref 98–107)
CO2: 28 MMOL/L (ref 20–31)
CREAT SERPL-MCNC: 0.79 MG/DL (ref 0.5–0.9)
DIFFERENTIAL TYPE: ABNORMAL
EOSINOPHILS RELATIVE PERCENT: 4 % (ref 1–4)
GFR AFRICAN AMERICAN: >60 ML/MIN
GFR NON-AFRICAN AMERICAN: >60 ML/MIN
GFR SERPL CREATININE-BSD FRML MDRD: ABNORMAL ML/MIN/{1.73_M2}
GFR SERPL CREATININE-BSD FRML MDRD: ABNORMAL ML/MIN/{1.73_M2}
GLUCOSE BLD-MCNC: 145 MG/DL (ref 70–99)
HCT VFR BLD CALC: 34.7 % (ref 36.3–47.1)
HEMOGLOBIN: 11.4 G/DL (ref 11.9–15.1)
IMMATURE GRANULOCYTES: 0 %
LACTIC ACID: 0.8 MMOL/L (ref 0.5–2.2)
LYMPHOCYTES # BLD: 28 % (ref 24–43)
MCH RBC QN AUTO: 30.4 PG (ref 25.2–33.5)
MCHC RBC AUTO-ENTMCNC: 32.9 G/DL (ref 28.4–34.8)
MCV RBC AUTO: 92.5 FL (ref 82.6–102.9)
MONOCYTES # BLD: 11 % (ref 3–12)
NRBC AUTOMATED: 0 PER 100 WBC
PDW BLD-RTO: 12.2 % (ref 11.8–14.4)
PLATELET # BLD: 233 K/UL (ref 138–453)
PLATELET ESTIMATE: ABNORMAL
PMV BLD AUTO: 11.2 FL (ref 8.1–13.5)
POTASSIUM SERPL-SCNC: 4.2 MMOL/L (ref 3.7–5.3)
RBC # BLD: 3.75 M/UL (ref 3.95–5.11)
RBC # BLD: ABNORMAL 10*6/UL
SEDIMENTATION RATE, ERYTHROCYTE: 17 MM (ref 0–20)
SEG NEUTROPHILS: 56 % (ref 36–65)
SEGMENTED NEUTROPHILS ABSOLUTE COUNT: 4.43 K/UL (ref 1.5–8.1)
SODIUM BLD-SCNC: 136 MMOL/L (ref 135–144)
TOTAL PROTEIN: 6.6 G/DL (ref 6.4–8.3)
WBC # BLD: 7.7 K/UL (ref 3.5–11.3)
WBC # BLD: ABNORMAL 10*3/UL

## 2021-12-17 PROCEDURE — 73630 X-RAY EXAM OF FOOT: CPT

## 2021-12-17 PROCEDURE — 99283 EMERGENCY DEPT VISIT LOW MDM: CPT

## 2021-12-17 PROCEDURE — 80053 COMPREHEN METABOLIC PANEL: CPT

## 2021-12-17 PROCEDURE — 85025 COMPLETE CBC W/AUTO DIFF WBC: CPT

## 2021-12-17 PROCEDURE — 96367 TX/PROPH/DG ADDL SEQ IV INF: CPT

## 2021-12-17 PROCEDURE — 85652 RBC SED RATE AUTOMATED: CPT

## 2021-12-17 PROCEDURE — 2580000003 HC RX 258: Performed by: EMERGENCY MEDICINE

## 2021-12-17 PROCEDURE — 83605 ASSAY OF LACTIC ACID: CPT

## 2021-12-17 PROCEDURE — 96365 THER/PROPH/DIAG IV INF INIT: CPT

## 2021-12-17 PROCEDURE — 87040 BLOOD CULTURE FOR BACTERIA: CPT

## 2021-12-17 PROCEDURE — 96366 THER/PROPH/DIAG IV INF ADDON: CPT

## 2021-12-17 PROCEDURE — 36415 COLL VENOUS BLD VENIPUNCTURE: CPT

## 2021-12-17 PROCEDURE — 6360000002 HC RX W HCPCS: Performed by: EMERGENCY MEDICINE

## 2021-12-17 RX ORDER — 0.9 % SODIUM CHLORIDE 0.9 %
1000 INTRAVENOUS SOLUTION INTRAVENOUS ONCE
Status: COMPLETED | OUTPATIENT
Start: 2021-12-17 | End: 2021-12-17

## 2021-12-17 RX ORDER — DOXYCYCLINE HYCLATE 100 MG
100 TABLET ORAL 2 TIMES DAILY
Qty: 20 TABLET | Refills: 0 | Status: SHIPPED | OUTPATIENT
Start: 2021-12-17 | End: 2021-12-27

## 2021-12-17 RX ADMIN — SODIUM CHLORIDE 1000 ML: 9 INJECTION, SOLUTION INTRAVENOUS at 03:03

## 2021-12-17 RX ADMIN — PIPERACILLIN AND TAZOBACTAM 3375 MG: 3; .375 INJECTION, POWDER, FOR SOLUTION INTRAVENOUS at 04:25

## 2021-12-17 RX ADMIN — VANCOMYCIN HYDROCHLORIDE 1000 MG: 1 INJECTION, POWDER, LYOPHILIZED, FOR SOLUTION INTRAVENOUS at 03:08

## 2021-12-17 ASSESSMENT — ENCOUNTER SYMPTOMS
COLOR CHANGE: 1
VOMITING: 0
BACK PAIN: 0
RHINORRHEA: 0
SHORTNESS OF BREATH: 0
DIARRHEA: 0
COUGH: 0
EYE REDNESS: 0

## 2021-12-17 NOTE — ED PROVIDER NOTES
[facility]  EMERGENCY DEPARTMENT ENCOUNTER      Pt Name: Robin Rodriguez  MRN: [id number]  [place] 1965  Date of evaluation: 12/17/2021  Provider: Ravin Adams, [address] [place] [address]       Chief Complaint   Patient presents with    Foot Pain     Pt is diabetic, has an artificial big toe joint. Pt now has wound on foot and redness, swelling to foot, and moving up leg.  Foot Swelling    Wound Infection         HISTORY OF PRESENT ILLNESS   (Location/Symptom, Timing/Onset, Context/Setting, Quality, Duration, Modifying Factors, Severity)  Note limiting factors. Robin Rodriguez is a 61 y.o. female who presents to the emergency department Rebecca Ott is a 74-year-old female with history of diabetes who presents with an infection of her left foot that she first noticed this evening. Patient states she has an artificial joint in her left foot and always has a wound proximal to her left first toe. This evening she noticed that wound looked worse and she had surrounding redness and swelling that is now working up her leg. She denies any fevers, cough, vomiting, chills or diarrhea. Review of systems is otherwise negative. HPI    Nursing Notes were reviewed. REVIEW OF SYSTEMS    (2-9 systems for level 4, 10 or more for level 5)     Review of Systems   Constitutional: Negative for chills and fever. HENT: Negative for congestion and rhinorrhea. Eyes: Negative for redness and visual disturbance. Respiratory: Negative for cough and shortness of breath. Cardiovascular: Positive for leg swelling. Negative for chest pain. Gastrointestinal: Negative for diarrhea and vomiting. Genitourinary: Negative for dysuria and hematuria. Musculoskeletal: Negative for back pain and neck pain. Skin: Positive for color change and wound. Neurological: Negative for weakness and headaches. Psychiatric/Behavioral: Negative for agitation and confusion.        Except as noted above the remainder of the review of systems was reviewed and negative. PAST MEDICAL HISTORY     Past Medical History:   Diagnosis Date    Endometriosis     Headache(784.0)     Hyperlipidemia     Hypertension     MRSA (methicillin resistant staph aureus) culture positive 01/31/2018    Obesity     Restless legs syndrome     Type 2 diabetes mellitus [facility])          SURGICAL HISTORY       Past Surgical History:   Procedure Laterality Date    APPENDECTOMY  1989    BUNIONECTOMY Left 08/26/2016   [address]; 1990    COLONOSCOPY  11/13/2017    Dr. Bhavana Stock prep-repeat 3-5 years with 2 day prep)    CYSTOSCOPY Left 03/05/2019    CYSTOSCOPY URETEROSCOPY LASER-WITH HLL performed by Urbano Hollingsworth MD at [address] [facility] [address] / [address] [facility] [address] [facility] [address] / STONE Left 03/05/2019    CYSTOSCOPY STENT INSERTION WITH BASKET EXTRACTION OF STONE TO BLADDER performed by Urbano Hollingsworth MD at [address] [facility] Left 10/11/2016    Resection infected non-union Lt.  Great toe; insertion antibx bone cement plug    FOOT SURGERY Left 12/21/2016    total arthroplasty with implant big toe    GASTRIC BYPASS SURGERY  2020    HYSTERECTOMY, TOTAL ABDOMINAL  1991    KNEE ARTHROSCOPY Right 2017    SD COLON CA SCRN NOT HI RSK IND N/A 11/13/2017    COLONOSCOPY performed by Vidya Santana MD at [facility] OFFICE/OUTPT [facility] Left 04/11/2018    FOOT BONE EXCISION-3RD TOE performed by Maribel Alexander DPM at [address] [facility] Right 01/31/2018    TOE AMPUTATION performed by Maribel Alexander DPM at [facility] [address] EXTEN/COMPLIC Right 12/06/5171    FOOT FLAP CLOSURE-DELAYED PRIMARY    AND DEBRIDEMENT OF THIRD TOE LEFT FOOT performed by Maribel Alexander DPM at [facility] Right 01/31/2018    Dr Nayla Odonnell- Right big toe   Marlenyjolly Oneal Previous Medications    ALLOPURINOL (ZYLOPRIM) 100 MG TABLET    Take 1 tablet by mouth daily    ASPIRIN ADULT LOW STRENGTH 81 MG EC TABLET    Take 1 tablet by mouth daily    CLICKFINE PEN NEEDLES 31G X 8 MM MISC    1 each daily     CLOTRIMAZOLE-BETAMETHASONE (LOTRISONE) 1-0.05 % CREAM    Apply topically 1 time daily as needed    DULOXETINE (CYMBALTA) 60 MG EXTENDED RELEASE CAPSULE    Take 60 mg by mouth daily    FERROUS SULFATE (IRON SUPPLEMENT PO)    Take 65 mg by mouth    GABAPENTIN (NEURONTIN) 300 MG CAPSULE    Take 300 mg by mouth nightly.      METFORMIN ER (GLUCOPHAGE-XR) 500 MG XR TABLET    Take 1,000 mg by mouth 2 times daily     METOPROLOL SUCCINATE (TOPROL XL) 50 MG EXTENDED RELEASE TABLET    Take 1 tablet by mouth 2 times daily    NONFORMULARY    Take by mouth daily Bariatric vitamin    NONFORMULARY    Alpha liopic acid    ONE TOUCH ULTRA TEST STRIP    1 each daily     PANTOPRAZOLE (PROTONIX) 40 MG TABLET        VITAMIN D (ERGOCALCIFEROL) 1.25 MG (03223 UT) CAPS CAPSULE           ALLERGIES     Celebrex [celecoxib], Lisinopril, and Nsaids    FAMILY HISTORY       Family History   Problem Relation Age of Onset    Allergies Mother     High Blood Pressure Mother     Heart Attack Mother     Arthritis Mother     Allergies Father     Heart Disease Father     Diabetes Father           SOCIAL HISTORY       Social History     Socioeconomic History    Marital status:      Spouse name: Not on file    Number of children: Not on file    Years of education: Not on file    Highest education level: Not on file   Occupational History    Not on file   Tobacco Use    Smoking status: Never Smoker    Smokeless tobacco: Never Used   Vaping Use    Vaping Use: Never used   Substance and Sexual Activity    Alcohol use: No     Alcohol/week: 0.0 standard drinks    Drug use: No    Sexual activity: Yes     Partners: Male   Other Topics Concern    Not on file   Social History Narrative    Not on file Social Determinants of Health     Financial Resource Strain: Low Risk     Difficulty of Paying Living Expenses: Not hard at all   Food Insecurity: No Food Insecurity    Worried About Running Out of Food in the Last Year: Never true    Lisa of Food in the Last Year: Never true   Transportation Needs: No Transportation Needs    Lack of Transportation (Medical): No    Lack of Transportation (Non-Medical): No   Physical Activity:     Days of Exercise per Week: Not on file    Minutes of Exercise per Session: Not on file   Stress:     Feeling of Stress : Not on file   Social Connections:     Frequency of Communication with Friends and Family: Not on file    Frequency of Social Gatherings with Friends and Family: Not on file    Attends Jew Services: Not on file    Active Member of [address] [place] [address] [employer] or Organizations: Not on file    Attends Club or Organization Meetings: Not on file    Marital Status: Not on file   Intimate Partner Violence:     Fear of Current or Ex-Partner: Not on file    Emotionally Abused: Not on file    Physically Abused: Not on file    Sexually Abused: Not on file   Housing Stability:     Unable to Pay for Housing in the Last Year: Not on file    Number of [place] in the Last Year: Not on file    Unstable Housing in the Last Year: Not on file       SCREENINGS        Loc Coma Scale  Eye Opening: Spontaneous  Best Verbal Response: Oriented  Best Motor Response: Obeys commands  Loc Coma Scale Score: 15               PHYSICAL EXAM    (up to 7 for level 4, 8 or more for level 5)     ED Triage Vitals [12/17/21 0123]   BP Temp Temp Source Pulse Resp SpO2 Height Weight   (!) 181/84 97.5 °F (36.4 °C) Tympanic 63 16 100 % -- 149 lb (67.6 kg)       Physical Exam  Vitals and nursing note reviewed. Constitutional:       General: She is not in acute distress. Appearance: She is obese. She is not toxic-appearing.       Comments: Patient is sitting in bed comfortably upon exam. She answers questions appropriately in full sentences. Eyes:      Extraocular Movements: Extraocular movements intact. Pupils: Pupils are equal, round, and reactive to light. Cardiovascular:      Rate and Rhythm: Normal rate and regular rhythm. Pulmonary:      Effort: Pulmonary effort is normal.      Breath sounds: Normal breath sounds. Abdominal:      General: Bowel sounds are normal.      Palpations: Abdomen is soft. Musculoskeletal:         General: Normal range of motion. Cervical back: Normal range of motion and neck supple. Comments: Edema of the left foot and ankle when compared to the right. There is a nonhealing wound just proximal to the left first toe with surrounding erythema and heat that extends into the anterior ankle. The erythema is not circumferential. Left foot is neurovascularly intact. Skin:     General: Skin is warm and dry. Capillary Refill: Capillary refill takes less than 2 seconds. Neurological:      General: No focal deficit present. Mental Status: She is alert. Psychiatric:         Mood and Affect: Mood normal.         DIAGNOSTIC RESULTS     EKG: All EKG's are interpreted by the Emergency Department Physician who either signs or Co-signs this chart in the absence of a cardiologist.        RADIOLOGY:   Non-plain film images such as CT, Ultrasound and MRI are read by the radiologist. Plain radiographic images are visualized and preliminarily interpreted by the emergency physician with the below findings:        Interpretation per the Radiologist below, if available at the time of this note:    XR FOOT LEFT (MIN 3 VIEWS)   Preliminary Result   Postsurgical changes are noted the 1st MTP joint and 3rd digit. There is   slightly increased periprosthetic lucency surrounding the proximal component   of the 1st metatarsal arthroplasty measuring up to 2 mm. Loosening or   infection cannot be excluded.       No other acute osseous abnormality or obvious erosive changes to suggest   acute osteomyelitis. Mild soft tissue swelling with soft tissue defect along the plantar aspect   which may correlate to an ulceration or wound. ED BEDSIDE ULTRASOUND:   Performed by ED Physician - none    LABS:  Labs Reviewed   CBC WITH AUTO DIFFERENTIAL - Abnormal; Notable for the following components:       Result Value    RBC 3.75 (*)     Hemoglobin 11.4 (*)     Hematocrit 34.7 (*)     All other components within normal limits   COMPREHENSIVE METABOLIC PANEL W/ REFLEX TO MG FOR LOW K - Abnormal; Notable for the following components:    Glucose 145 (*)     BUN 27 (*)     Bun/Cre Ratio 34 (*)     Chloride 97 (*)     All other components within normal limits   CULTURE, BLOOD 1   CULTURE, BLOOD 2   LACTIC ACID   SEDIMENTATION RATE       All other labs were within normal range or not returned as of this dictation.     EMERGENCY DEPARTMENT COURSE and DIFFERENTIAL DIAGNOSIS/MDM:   Vitals:    Vitals:    12/17/21 0123 12/17/21 0127   BP: (!) 181/84 (!) 181/84   Pulse: 63 86   Resp: 16 16   Temp: 97.5 °F (36.4 °C) 97.5 °F (36.4 °C)   TempSrc: Tympanic Tympanic   SpO2: 100% 100%   Weight: 149 lb (67.6 kg) 149 lb (67.6 kg)           MDM  Number of Diagnoses or Management Options  Cellulitis of left foot: new and requires workup     Amount and/or Complexity of Data Reviewed  Clinical lab tests: reviewed and ordered  Tests in the radiology section of CPT®: reviewed and ordered  Tests in the medicine section of CPT®: ordered and reviewed  Obtain history from someone other than the patient: yes  Review and summarize past medical records: yes  Independent visualization of images, tracings, or specimens: yes    Risk of Complications, Morbidity, and/or Mortality  Presenting problems: low  Diagnostic procedures: low  Management options: low    Critical Care  Total time providing critical care: < 30 minutes    Patient Progress  Patient progress: improved        REASSESSMENT     ED Course as of 12/17/21 0457   Fri Dec 17, 2021   3734 Xenia Julio is a 41-year-old female with history of diabetes who presents with an infection of her left foot that she first noticed this evening. Patient states she has an artificial joint in her left foot and always has a wound proximal to her left first toe. This evening she noticed that wound looked worse and she had surrounding redness and swelling that is now working up her leg. She denies any fevers, cough, vomiting, chills or diarrhea. Review of systems is otherwise negative. Upon arrival the patient is hypertensive. Physical exam does show edema erythema and heat of the left foot when compared to the right. Appropriate labs and imaging are ordered to assess for possible underlying etiology including but not limited to osteomyelitis and cellulitis. Fluids as well as a dose of vancomycin and Zosyn are ordered. Patient is counseled we will hopefully get her home on p.o. antibiotics. [AB]   9948 Foot xray shows: Postsurgical changes are noted the 1st MTP joint and 3rd digit. There is  slightly increased periprosthetic lucency surrounding the proximal component  of the 1st metatarsal arthroplasty measuring up to 2 mm. Loosening or  infection cannot be excluded.     No other acute osseous abnormality or obvious erosive changes to suggest  acute osteomyelitis.     Mild soft tissue swelling with soft tissue defect along the plantar aspect  which may correlate to an ulceration or wound. [AB]   0315 Lactate is negative. [AB]   0988 Second antibiotic is running in. [AB]   0451 At reevaluation the patient is laying in bed comfortably. She is updated on the work-up results and plan to discharge with an oral antibiotic and follow-up. A line is drawn to sumeet the patient's current areas of erythema. Patient is agreeable to plan and all questions were answered.   Strict return precautions are given. [AB]      ED Course User Index  [AB] Gloria Meneses DO         CRITICAL CARE TIME   Total Critical Care time was 0 minutes, excluding separately reportable procedures. There was a high probability of clinically significant/life threatening deterioration in the patient's condition which required my urgent intervention. CONSULTS:  PHARMACY TO DOSE VANCOMYCIN    PROCEDURES:  Unless otherwise noted below, none     Procedures        FINAL IMPRESSION      1. Cellulitis of left foot          DISPOSITION/PLAN   DISPOSITION Decision To Discharge 12/17/2021 04:39:38 AM      PATIENT REFERRED TO:  Kristofer Ch MD  [place] [address], [address]  [phone]    Schedule an appointment as soon as possible for a visit         DISCHARGE MEDICATIONS:  New Prescriptions    DOXYCYCLINE HYCLATE (VIBRA-TABS) 100 MG TABLET    Take 1 tablet by mouth 2 times daily for 10 days     Controlled Substances Monitoring:     No flowsheet data found.     (Please note that portions of this note were completed with a voice recognition program.  Efforts were made to edit the dictations but occasionally words are mis-transcribed.)    David Peterson DO (electronically signed)  Attending Emergency Physician            David Peterson DO  12/17/21 4368

## 2021-12-17 NOTE — PROGRESS NOTES
Pharmacy Note  Vancomycin Consult    Peace Negron is a 61 y.o. female started on Vancomycin for skin/soft tissue; consult received from Dr. Deyanne Phoenix to manage therapy. Also receiving the following antibiotics: zosyn. 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 Estimated Creatinine Clearance: 79 mL/min (based on SCr of 0.79 mg/dL). Goal AUC:  400-600  Goal Trough Level: 10-20 mcg/mL    Assessment/Plan:  Will initiate Vancomycin with a one time loading dose of 1000 mg x1, followed by 750 mg IV every 12 hours. Timing of trough level will be determined based on culture results, renal function, and clinical response. Thank you for the consult. Will continue to follow.

## 2021-12-22 LAB
CULTURE: NORMAL
CULTURE: NORMAL
Lab: NORMAL
Lab: NORMAL
SPECIMEN DESCRIPTION: NORMAL
SPECIMEN DESCRIPTION: NORMAL

## 2022-02-10 ENCOUNTER — HOSPITAL ENCOUNTER (OUTPATIENT)
Age: 57
Setting detail: SPECIMEN
Discharge: HOME | End: 2022-02-10
Payer: COMMERCIAL

## 2022-02-10 ENCOUNTER — HOSPITAL ENCOUNTER (OUTPATIENT)
Age: 57
Discharge: HOME | End: 2022-02-12
Payer: COMMERCIAL

## 2022-02-10 ENCOUNTER — HOSPITAL ENCOUNTER (OUTPATIENT)
Dept: GENERAL RADIOLOGY | Age: 57
Discharge: HOME | End: 2022-02-12
Payer: COMMERCIAL

## 2022-02-10 ENCOUNTER — HOSPITAL ENCOUNTER (OUTPATIENT)
Dept: WOUND CARE | Age: 57
Discharge: HOME | End: 2022-02-10
Payer: COMMERCIAL

## 2022-02-10 ENCOUNTER — OFFICE VISIT (OUTPATIENT)
Dept: PODIATRY | Age: 57
End: 2022-02-10
Payer: COMMERCIAL

## 2022-02-10 VITALS
HEART RATE: 77 BPM | HEIGHT: 68 IN | TEMPERATURE: 97.5 F | RESPIRATION RATE: 18 BRPM | DIASTOLIC BLOOD PRESSURE: 72 MMHG | SYSTOLIC BLOOD PRESSURE: 148 MMHG | BODY MASS INDEX: 22.66 KG/M2

## 2022-02-10 DIAGNOSIS — E11.621 DIABETIC ULCER OF TOE OF LEFT FOOT ASSOCIATED WITH TYPE 2 DIABETES MELLITUS, WITH MUSCLE INVOLVEMENT WITHOUT EVIDENCE OF NECROSIS (HCC): ICD-10-CM

## 2022-02-10 DIAGNOSIS — L97.525 DIABETIC ULCER OF TOE OF LEFT FOOT ASSOCIATED WITH TYPE 2 DIABETES MELLITUS, WITH MUSCLE INVOLVEMENT WITHOUT EVIDENCE OF NECROSIS (HCC): ICD-10-CM

## 2022-02-10 DIAGNOSIS — L97.525 DIABETIC ULCER OF TOE OF LEFT FOOT ASSOCIATED WITH TYPE 2 DIABETES MELLITUS, WITH MUSCLE INVOLVEMENT WITHOUT EVIDENCE OF NECROSIS (HCC): Primary | ICD-10-CM

## 2022-02-10 DIAGNOSIS — E11.621 DIABETIC ULCER OF TOE OF LEFT FOOT ASSOCIATED WITH TYPE 2 DIABETES MELLITUS, WITH MUSCLE INVOLVEMENT WITHOUT EVIDENCE OF NECROSIS (HCC): Primary | ICD-10-CM

## 2022-02-10 PROCEDURE — 87186 SC STD MICRODIL/AGAR DIL: CPT

## 2022-02-10 PROCEDURE — 73630 X-RAY EXAM OF FOOT: CPT

## 2022-02-10 PROCEDURE — 99213 OFFICE O/P EST LOW 20 MIN: CPT | Performed by: PODIATRIST

## 2022-02-10 PROCEDURE — 87205 SMEAR GRAM STAIN: CPT

## 2022-02-10 PROCEDURE — 87077 CULTURE AEROBIC IDENTIFY: CPT

## 2022-02-10 PROCEDURE — 99213 OFFICE O/P EST LOW 20 MIN: CPT

## 2022-02-10 PROCEDURE — 87070 CULTURE OTHR SPECIMN AEROBIC: CPT

## 2022-02-10 PROCEDURE — 99999 PR OFFICE/OUTPT VISIT,PROCEDURE ONLY: CPT | Performed by: PODIATRIST

## 2022-02-10 PROCEDURE — 86403 PARTICLE AGGLUT ANTBDY SCRN: CPT

## 2022-02-10 RX ORDER — BACITRACIN ZINC AND POLYMYXIN B SULFATE 500; 1000 [USP'U]/G; [USP'U]/G
OINTMENT TOPICAL ONCE
OUTPATIENT
Start: 2022-02-10 | End: 2022-02-10

## 2022-02-10 RX ORDER — GENTAMICIN SULFATE 1 MG/G
OINTMENT TOPICAL ONCE
Status: CANCELLED | OUTPATIENT
Start: 2022-02-10 | End: 2022-02-10

## 2022-02-10 RX ORDER — BACITRACIN ZINC AND POLYMYXIN B SULFATE 500; 1000 [USP'U]/G; [USP'U]/G
OINTMENT TOPICAL ONCE
Status: CANCELLED | OUTPATIENT
Start: 2022-02-10 | End: 2022-02-10

## 2022-02-10 RX ORDER — GENTAMICIN SULFATE 1 MG/G
OINTMENT TOPICAL ONCE
OUTPATIENT
Start: 2022-02-10 | End: 2022-02-10

## 2022-02-10 RX ORDER — GINSENG 100 MG
CAPSULE ORAL ONCE
Status: CANCELLED | OUTPATIENT
Start: 2022-02-10 | End: 2022-02-10

## 2022-02-10 RX ORDER — GINSENG 100 MG
CAPSULE ORAL ONCE
OUTPATIENT
Start: 2022-02-10 | End: 2022-02-10

## 2022-02-10 RX ORDER — BACITRACIN, NEOMYCIN, POLYMYXIN B 400; 3.5; 5 [USP'U]/G; MG/G; [USP'U]/G
OINTMENT TOPICAL ONCE
OUTPATIENT
Start: 2022-02-10 | End: 2022-02-10

## 2022-02-10 RX ORDER — BACITRACIN, NEOMYCIN, POLYMYXIN B 400; 3.5; 5 [USP'U]/G; MG/G; [USP'U]/G
OINTMENT TOPICAL ONCE
Status: CANCELLED | OUTPATIENT
Start: 2022-02-10 | End: 2022-02-10

## 2022-02-10 NOTE — PATIENT INSTRUCTIONS
PODIATRY PATIENT DISCHARGE INSTRUCTIONS    CALL [phone] regarding podiatry questions    OFFICE HOURS ARE TUESDAY MORNING  8:30-NOON and can change with out notice    Discharge instructions for patient's plan of care:    Left 2nd toe area  Xray today. Culture done today. Dr. Kim Armstrong will call with plan of care  Apply silver alginate daily to 2nd toe. We hope we gave you VERY GOOD care today!

## 2022-02-10 NOTE — PLAN OF CARE
Problem: Falls - Risk of:  Goal: Will remain free from falls  Description: Will remain free from falls  Outcome: Met This Shift     Problem: Wound:  Goal: Will show signs of wound healing; wound closure and no evidence of infection  Description: Will show signs of wound healing; wound closure and no evidence of infection   Outcome: Ongoing     Problem: Blood Glucose:  Goal: Ability to maintain appropriate glucose levels will improve  Description: Ability to maintain appropriate glucose levels will improve   Outcome: Ongoing     Problem: Skin Integrity:  Goal: Risk for impaired skin integrity will decrease  Description: Risk for impaired skin integrity will decrease  Outcome: Ongoing

## 2022-02-15 ENCOUNTER — TELEPHONE (OUTPATIENT)
Dept: WOUND CARE | Age: 57
End: 2022-02-15

## 2022-02-15 DIAGNOSIS — A49.02 INFECTION WITH METHICILLIN-RESISTANT STAPHYLOCOCCUS AUREUS (MRSA): Primary | ICD-10-CM

## 2022-02-15 RX ORDER — DOXYCYCLINE HYCLATE 100 MG
100 TABLET ORAL 2 TIMES DAILY
Qty: 28 TABLET | Refills: 0 | Status: SHIPPED | OUTPATIENT
Start: 2022-02-15 | End: 2022-03-01

## 2022-02-15 NOTE — TELEPHONE ENCOUNTER
Phone call to Lesley Jensen regarding antibiotic ordered at [facility] Doxycycline 100 mg 1 tablet twice a day for 14 days. .  Patient verbalized understanding and questions answered.

## 2022-02-15 NOTE — PROGRESS NOTES
Subjective      Devon Nixon is a 61 y.o. female who presents for evaluation. She  Has pathology &  Wound(s) which are/is located on the  toes left, 2nd toe. CC; drainage from 2nd toe ; bottom  HPI: worsening last 5 days           No self care           MRR: \"bulbous\" mechanical exam to 2nd toe ; static                     Previous cc. R4 toe ; recommended Sx ; patient self monitor - healing                      Recent significant Weight loss (Bariatric) AND diligent exercise program                     Previous DDx; orthopedic insuff. Due to adverse Sx result ; f/u 1st MPJ replacement (I'm pleased with that -- I'm exercising aren't I \" )    PAST MEDICAL HISTORY     has a past medical history of Endometriosis, Headache(784.0), Hyperlipidemia, Hypertension, MRSA (methicillin resistant staph aureus) culture positive, Obesity, Restless legs syndrome, and Type 2 diabetes mellitus ([facility] [address] MEDICATIONS    Current Outpatient Medications   Medication Sig Dispense Refill    allopurinol (ZYLOPRIM) 100 MG tablet Take 1 tablet by mouth daily 90 tablet 2    Ferrous Sulfate (IRON SUPPLEMENT PO) Take 65 mg by mouth      NONFORMULARY Alpha liopic acid      vitamin D (ERGOCALCIFEROL) 1.25 MG (16915 UT) CAPS capsule       pantoprazole (PROTONIX) 40 MG tablet       metoprolol succinate (TOPROL XL) 50 MG extended release tablet Take 1 tablet by mouth 2 times daily 180 tablet 2    NONFORMULARY Take by mouth daily Bariatric vitamin      gabapentin (NEURONTIN) 300 MG capsule Take 300 mg by mouth nightly.        clotrimazole-betamethasone (LOTRISONE) 1-0.05 % cream Apply topically 1 time daily as needed 1 Tube 1    ASPIRIN ADULT LOW STRENGTH 81 MG EC tablet Take 1 tablet by mouth daily      DULoxetine (CYMBALTA) 60 MG extended release capsule Take 60 mg by mouth daily      ONE TOUCH ULTRA TEST strip 1 each daily       metFORMIN ER (GLUCOPHAGE-XR) 500 MG XR tablet Take 1,000 mg by mouth 2 times daily       ROSALVA PEN NEEDLES 31G X 8 MM MISC 1 each daily        No current facility-administered medications for this encounter. ALLERGIES    Allergies   Allergen Reactions    Celebrex [Celecoxib]     Lisinopril     Nsaids      Too harsh for stomach       PAST SURGICAL HISTORY    Past Surgical History:   Procedure Laterality Date    APPENDECTOMY  1989    BUNIONECTOMY Left 08/26/2016   [facility]; 1990    COLONOSCOPY  11/13/2017    Dr. Han Rey prep-repeat 3-5 years with 2 day prep)    CYSTOSCOPY Left 03/05/2019    CYSTOSCOPY URETEROSCOPY LASER-WITH HLL performed by Esdras Niño MD at [address] [place] [address] / [address] [place] [address] Rd / Savi Briggs Left 03/05/2019    CYSTOSCOPY STENT INSERTION WITH BASKET EXTRACTION OF STONE TO BLADDER performed by Esdras Niño MD at [facility] Left 10/11/2016    Resection infected non-union Lt.  Great toe; insertion antibx bone cement plug    FOOT SURGERY Left 12/21/2016    total arthroplasty with implant big toe    GASTRIC BYPASS SURGERY  2020    HYSTERECTOMY, TOTAL ABDOMINAL  1991    KNEE ARTHROSCOPY Right 2017    DE COLON CA SCRN NOT HI RSK IND N/A 11/13/2017    COLONOSCOPY performed by Kirill White MD at [address] [facility] OFFICE/OUTPT [facility] Left 04/11/2018    FOOT BONE EXCISION-3RD TOE performed by Lizette Nathan DPM at [facility] Right 01/31/2018    TOE AMPUTATION performed by Lizette Nathan DPM at [facility] [address] EXTEN/COMPLIC Right 69/74/1684    FOOT FLAP CLOSURE-DELAYED PRIMARY    AND DEBRIDEMENT OF THIRD TOE LEFT FOOT performed by Lizette Nathan DPM at [facility] [address] Right 01/31/2018    Dr Casi Akers- Right big toe    UMBILICAL HERNIA REPAIR  2011    URETER STENT PLACEMENT Left        FAMILY HISTORY    Family History   Problem Relation Age of Onset    Allergies Mother     High Blood Pressure Mother    [place] Heart Attack Mother     Arthritis Mother     Allergies Father     Heart Disease Father     Diabetes Father        SOCIAL HISTORY    Social History     Socioeconomic History    Marital status:      Spouse name: Not on file    Number of children: Not on file    Years of education: Not on file    Highest education level: Not on file   Occupational History    Not on file   Tobacco Use    Smoking status: Never Smoker    Smokeless tobacco: Never Used   Vaping Use    Vaping Use: Never used   Substance and Sexual Activity    Alcohol use: No     Alcohol/week: 0.0 standard drinks    Drug use: No    Sexual activity: Yes     Partners: Male   Other Topics Concern    Not on file   Social History Narrative    Not on file     Social Determinants of Health     Financial Resource Strain: Low Risk     Difficulty of Paying Living Expenses: Not hard at all   Food Insecurity: No Food Insecurity    Worried About [employer] in the Last Year: Never true    Lisa of Food in the Last Year: Never true   Transportation Needs: No Transportation Needs    Lack of Transportation (Medical): No    Lack of Transportation (Non-Medical):  No   Physical Activity:     Days of Exercise per Week: Not on file    Minutes of Exercise per Session: Not on file   Stress:     Feeling of Stress : Not on file   Social Connections:     Frequency of Communication with Friends and Family: Not on file    Frequency of Social Gatherings with Friends and Family: Not on file    Attends Zoroastrianism Services: Not on file    Active Member of Clubs or Organizations: Not on file    Attends Club or Organization Meetings: Not on file    Marital Status: Not on file   Intimate Partner Violence:     Fear of Current or Ex-Partner: Not on file    Emotionally Abused: Not on file    Physically Abused: Not on file    Sexually Abused: Not on file   Housing Stability:     Unable to Pay for Housing in the Last Year: Not on file    Number of Places Lived in the Last Year: Not on file    Unstable Housing in the Last Year: Not on file         REVIEW OF SYSTEMS    Review of Systems:       Negative for: fever/chills, headache, visual changes, chest pain, shortness of breath, nausea/vomiting/diarrhea, bruising, rash and weakness,claudication                                           Positive for DM , PREVIOUS                                                              Foot Sx(s) ; abscess hx / amputation result                                                             +Weight loss ; bariatric Sx     Multidisciplinary assessment by members of the wound care team is carried out, including vitals and check of review of systems and accepted.     PE: VSS, Afebrile, NAD, A&O x 4, pleasant          Ambulatory in [facility]         L2 ; plantar toe ; FT+ punctate ulceration macerated , no current draiange                                    +probed to deep fascia  / ? PTB                                    -erythema / no warmth                                    +bulbous hyperextension of distal phalanx and previous DIPJ                                    +rigid area of previous PIPJ arthrodesis               X-ray : pending                      Physical Exam   Musculoskeletal:        Feet:        Wound Care Documentation   Wound 02/10/22 Toe (Comment  which one) Left;Plantar #1 left 2nd toe (Active)   Wound Etiology Diabetic 02/10/22 1459   Dressing Status Old drainage noted 02/10/22 1459   Wound Cleansed Irrigated with saline 02/10/22 1459   Dressing/Treatment Adhesive bandage 02/10/22 1459   Offloading for Diabetic Foot Ulcers Diabetic shoes/inserts 02/10/22 1459   Dressing Change Due 02/11/22 02/10/22 1506   Wound Length (cm) 0.8 cm 02/10/22 1459   Wound Width (cm) 0.5 cm 02/10/22 1459   Wound Depth (cm) 0.4 cm 02/10/22 1459   Wound Surface Area (cm^2) 0.4 cm^2 02/10/22 1459   Wound Volume (cm^3) 0.16 cm^3 02/10/22 1459   Wound Assessment Pink/red 02/10/22 1459 Drainage Amount Moderate 02/10/22 1459   Drainage Description Serosanguinous 02/10/22 1459   Odor None 02/10/22 1459   Ruthann-wound Assessment Warm;Hemosiderin staining (brown yellow) 02/10/22 1459   Margins Attached edges 02/10/22 1459   Wound Thickness Description not for Pressure Injury Full thickness 02/10/22 1459   Number of days: 4            Debridement no  IMP: FT ulceration of plantar 2nd toe , LEFT           S/s orthopedic deformity and mechanical insuff. 2nd toe ; distal phalanx   REC: culture             X-ray             Protective dressing care   Tx: counseling and coordination of Tier 1 digital deformity algorithm          Time element  ; 25 minutes  P: see orders / AVS / Rx        RTC 2 weeks               Today's dressing: alginate  Today's orders: see AVS         Yayo Gayle DPM  2/10/2022    Addendum : culture -- MRSA-CA , Streph GRP B & Enterobacter                        Rx Doxycycline 100 mg BID x 14 days     Yayo Gayle DPM  2/15/2022  12:06 PM    Addendum : Win Hoang ; (attn.  2nd toe) -- NO interval changes or apparent progressive pathology of 2nd toe                                                             Hx. PIPJ fusion                       Clinical exam WORSE than X-ray exam     Yayo Gayle DPM  2/17/2022  8:41 AM

## 2022-03-10 ENCOUNTER — TELEPHONE (OUTPATIENT)
Dept: PODIATRY | Age: 57
End: 2022-03-10

## 2022-03-10 NOTE — TELEPHONE ENCOUNTER
Called patient on 2/24/22 and offered appointment in clinic. Patient stated \" I think I am doing better it is not red anymore\" Offered her appointment on 2/24/22 but said she didn't think she needed and will give a call if she needs to be seen. \"

## 2023-02-01 PROBLEM — N30.00 ACUTE CYSTITIS WITHOUT HEMATURIA: Status: ACTIVE | Noted: 2023-02-01

## 2023-07-11 PROBLEM — E11.42 TYPE 2 DIABETES MELLITUS WITH DIABETIC POLYNEUROPATHY, WITH LONG-TERM CURRENT USE OF INSULIN (HCC): Status: ACTIVE | Noted: 2023-07-11

## 2023-07-11 PROBLEM — Z79.4 TYPE 2 DIABETES MELLITUS WITH DIABETIC POLYNEUROPATHY, WITH LONG-TERM CURRENT USE OF INSULIN (HCC): Status: ACTIVE | Noted: 2023-07-11

## 2023-09-12 ENCOUNTER — HOSPITAL ENCOUNTER (OUTPATIENT)
Dept: WOMENS IMAGING | Age: 58
Discharge: HOME | End: 2023-09-14
Attending: INTERNAL MEDICINE
Payer: COMMERCIAL

## 2023-09-12 VITALS — BODY MASS INDEX: 26.52 KG/M2 | HEIGHT: 68 IN | WEIGHT: 175 LBS

## 2023-09-12 DIAGNOSIS — Z12.31 VISIT FOR SCREENING MAMMOGRAM: ICD-10-CM

## 2023-09-12 PROCEDURE — 77063 BREAST TOMOSYNTHESIS BI: CPT

## 2023-10-16 ENCOUNTER — HOSPITAL ENCOUNTER (OUTPATIENT)
Dept: GENERAL RADIOLOGY | Age: 58
Discharge: HOME | End: 2023-10-18
Payer: COMMERCIAL

## 2023-10-16 ENCOUNTER — HOSPITAL ENCOUNTER (OUTPATIENT)
Age: 58
Discharge: HOME | End: 2023-10-16
Payer: COMMERCIAL

## 2023-10-16 ENCOUNTER — HOSPITAL ENCOUNTER (OUTPATIENT)
Age: 58
Discharge: HOME | End: 2023-10-18
Payer: COMMERCIAL

## 2023-10-16 DIAGNOSIS — M79.601 BILATERAL ARM PAIN: ICD-10-CM

## 2023-10-16 DIAGNOSIS — M79.602 BILATERAL ARM PAIN: ICD-10-CM

## 2023-10-16 DIAGNOSIS — I10 ESSENTIAL HYPERTENSION: ICD-10-CM

## 2023-10-16 LAB
ALBUMIN SERPL-MCNC: 4.2 G/DL (ref 3.5–5.2)
ALBUMIN/GLOB SERPL: 1.4 {RATIO} (ref 1–2.5)
ALP SERPL-CCNC: 117 U/L (ref 35–104)
ALT SERPL-CCNC: 13 U/L (ref 5–33)
ANION GAP SERPL CALCULATED.3IONS-SCNC: 12 MMOL/L (ref 9–17)
AST SERPL-CCNC: 17 U/L
BILIRUB SERPL-MCNC: 0.4 MG/DL (ref 0.3–1.2)
BUN SERPL-MCNC: 39 MG/DL (ref 6–20)
BUN/CREAT SERPL: 26 (ref 9–20)
CALCIUM SERPL-MCNC: 9.9 MG/DL (ref 8.6–10.4)
CHLORIDE SERPL-SCNC: 99 MMOL/L (ref 98–107)
CO2 SERPL-SCNC: 29 MMOL/L (ref 20–31)
CREAT SERPL-MCNC: 1.5 MG/DL (ref 0.5–0.9)
GFR SERPL CREATININE-BSD FRML MDRD: 40 ML/MIN/1.73M2
GLUCOSE SERPL-MCNC: 182 MG/DL (ref 70–99)
POTASSIUM SERPL-SCNC: 5.5 MMOL/L (ref 3.7–5.3)
PROT SERPL-MCNC: 7.3 G/DL (ref 6.4–8.3)
SODIUM SERPL-SCNC: 140 MMOL/L (ref 135–144)

## 2023-10-16 PROCEDURE — 80053 COMPREHEN METABOLIC PANEL: CPT

## 2023-10-16 PROCEDURE — 73030 X-RAY EXAM OF SHOULDER: CPT

## 2023-10-16 PROCEDURE — 36415 COLL VENOUS BLD VENIPUNCTURE: CPT

## 2023-10-20 ENCOUNTER — HOSPITAL ENCOUNTER (OUTPATIENT)
Dept: WOMENS IMAGING | Age: 58
Discharge: HOME | End: 2023-10-20
Attending: INTERNAL MEDICINE
Payer: COMMERCIAL

## 2023-10-20 ENCOUNTER — HOSPITAL ENCOUNTER (OUTPATIENT)
Dept: ULTRASOUND IMAGING | Age: 58
End: 2023-10-20
Attending: INTERNAL MEDICINE
Payer: COMMERCIAL

## 2023-10-20 DIAGNOSIS — R92.8 ABNORMAL MAMMOGRAM OF LEFT BREAST: ICD-10-CM

## 2023-10-20 PROCEDURE — G0279 TOMOSYNTHESIS, MAMMO: HCPCS

## 2023-11-20 ENCOUNTER — HOSPITAL ENCOUNTER (OUTPATIENT)
Age: 58
Discharge: HOME | End: 2023-11-20
Payer: COMMERCIAL

## 2023-11-20 LAB
25(OH)D3 SERPL-MCNC: 53.6 NG/ML
ALBUMIN SERPL-MCNC: 4.4 G/DL (ref 3.5–5.2)
ALBUMIN/GLOB SERPL: 1.6 {RATIO} (ref 1–2.5)
ALP SERPL-CCNC: 95 U/L (ref 35–104)
ALT SERPL-CCNC: 15 U/L (ref 5–33)
ANION GAP SERPL CALCULATED.3IONS-SCNC: 11 MMOL/L (ref 9–17)
AST SERPL-CCNC: 17 U/L
BASOPHILS # BLD: 0.11 K/UL (ref 0–0.2)
BASOPHILS NFR BLD: 2 % (ref 0–2)
BILIRUB SERPL-MCNC: 0.4 MG/DL (ref 0.3–1.2)
BUN SERPL-MCNC: 35 MG/DL (ref 6–20)
BUN/CREAT SERPL: 32 (ref 9–20)
CALCIUM SERPL-MCNC: 9.9 MG/DL (ref 8.6–10.4)
CHLORIDE SERPL-SCNC: 100 MMOL/L (ref 98–107)
CO2 SERPL-SCNC: 29 MMOL/L (ref 20–31)
CREAT SERPL-MCNC: 1.1 MG/DL (ref 0.5–0.9)
EOSINOPHIL # BLD: 0.49 K/UL (ref 0–0.44)
EOSINOPHILS RELATIVE PERCENT: 7 % (ref 1–4)
ERYTHROCYTE [DISTWIDTH] IN BLOOD BY AUTOMATED COUNT: 12.8 % (ref 11.8–14.4)
FERRITIN SERPL-MCNC: 34 NG/ML (ref 13–150)
FOLATE SERPL-MCNC: >20 NG/ML (ref 4.8–24.2)
GFR SERPL CREATININE-BSD FRML MDRD: 58 ML/MIN/1.73M2
GLUCOSE SERPL-MCNC: 170 MG/DL (ref 70–99)
HCT VFR BLD AUTO: 43.1 % (ref 36.3–47.1)
HGB BLD-MCNC: 13.6 G/DL (ref 11.9–15.1)
IMM GRANULOCYTES # BLD AUTO: <0.03 K/UL (ref 0–0.3)
IMM GRANULOCYTES NFR BLD: 0 %
IRON SATN MFR SERPL: 24 % (ref 20–55)
IRON SERPL-MCNC: 73 UG/DL (ref 37–145)
LYMPHOCYTES NFR BLD: 2.42 K/UL (ref 1.1–3.7)
LYMPHOCYTES RELATIVE PERCENT: 36 % (ref 24–43)
MAGNESIUM SERPL-MCNC: 2.2 MG/DL (ref 1.6–2.6)
MCH RBC QN AUTO: 30 PG (ref 25.2–33.5)
MCHC RBC AUTO-ENTMCNC: 31.6 G/DL (ref 28.4–34.8)
MCV RBC AUTO: 95.1 FL (ref 82.6–102.9)
MONOCYTES NFR BLD: 0.55 K/UL (ref 0.1–1.2)
MONOCYTES NFR BLD: 8 % (ref 3–12)
NEUTROPHILS NFR BLD: 47 % (ref 36–65)
NEUTS SEG NFR BLD: 3.14 K/UL (ref 1.5–8.1)
NRBC BLD-RTO: 0 PER 100 WBC
PHOSPHATE SERPL-MCNC: 3.9 MG/DL (ref 2.6–4.5)
PLATELET # BLD AUTO: 280 K/UL (ref 138–453)
PMV BLD AUTO: 10.9 FL (ref 8.1–13.5)
POTASSIUM SERPL-SCNC: 4.6 MMOL/L (ref 3.7–5.3)
PROT SERPL-MCNC: 7.2 G/DL (ref 6.4–8.3)
RBC # BLD AUTO: 4.53 M/UL (ref 3.95–5.11)
SODIUM SERPL-SCNC: 140 MMOL/L (ref 135–144)
TIBC SERPL-MCNC: 310 UG/DL (ref 250–450)
UNSATURATED IRON BINDING CAPACITY: 237 UG/DL (ref 112–347)
VIT B12 SERPL-MCNC: >2000 PG/ML (ref 232–1245)
WBC OTHER # BLD: 6.7 K/UL (ref 3.5–11.3)

## 2023-11-20 PROCEDURE — 83550 IRON BINDING TEST: CPT

## 2023-11-20 PROCEDURE — 36415 COLL VENOUS BLD VENIPUNCTURE: CPT

## 2023-11-20 PROCEDURE — 84425 ASSAY OF VITAMIN B-1: CPT

## 2023-11-20 PROCEDURE — 82728 ASSAY OF FERRITIN: CPT

## 2023-11-20 PROCEDURE — 80053 COMPREHEN METABOLIC PANEL: CPT

## 2023-11-20 PROCEDURE — 82306 VITAMIN D 25 HYDROXY: CPT

## 2023-11-20 PROCEDURE — 84100 ASSAY OF PHOSPHORUS: CPT

## 2023-11-20 PROCEDURE — 83540 ASSAY OF IRON: CPT

## 2023-11-20 PROCEDURE — 82746 ASSAY OF FOLIC ACID SERUM: CPT

## 2023-11-20 PROCEDURE — 85025 COMPLETE CBC W/AUTO DIFF WBC: CPT

## 2023-11-20 PROCEDURE — 82607 VITAMIN B-12: CPT

## 2023-11-20 PROCEDURE — 83735 ASSAY OF MAGNESIUM: CPT

## 2023-11-23 LAB — VIT B1 PYROPHOSHATE BLD-SCNC: 270 NMOL/L (ref 70–180)

## 2024-03-20 ENCOUNTER — HOSPITAL ENCOUNTER (OUTPATIENT)
Age: 59
Discharge: HOME | End: 2024-03-20
Payer: COMMERCIAL

## 2024-03-20 LAB
ALBUMIN SERPL-MCNC: 4 G/DL (ref 3.5–5.2)
ALBUMIN/GLOB SERPL: 1.4 {RATIO} (ref 1–2.5)
ALP SERPL-CCNC: 105 U/L (ref 35–104)
ALT SERPL-CCNC: 18 U/L (ref 5–33)
ANION GAP SERPL CALCULATED.3IONS-SCNC: 9 MMOL/L (ref 9–17)
AST SERPL-CCNC: 21 U/L
BASOPHILS # BLD: 0.06 K/UL (ref 0–0.2)
BASOPHILS NFR BLD: 1 % (ref 0–2)
BILIRUB SERPL-MCNC: 0.4 MG/DL (ref 0.3–1.2)
BUN SERPL-MCNC: 23 MG/DL (ref 6–20)
BUN/CREAT SERPL: 26 (ref 9–20)
CALCIUM SERPL-MCNC: 9.3 MG/DL (ref 8.6–10.4)
CHLORIDE SERPL-SCNC: 102 MMOL/L (ref 98–107)
CO2 SERPL-SCNC: 29 MMOL/L (ref 20–31)
CREAT SERPL-MCNC: 0.9 MG/DL (ref 0.5–0.9)
EOSINOPHIL # BLD: 0.36 K/UL (ref 0–0.44)
EOSINOPHILS RELATIVE PERCENT: 5 % (ref 1–4)
ERYTHROCYTE [DISTWIDTH] IN BLOOD BY AUTOMATED COUNT: 13 % (ref 11.8–14.4)
FREE KAPPA/LAMBDA RATIO: 1.06 (ref 0.22–1.74)
GFR SERPL CREATININE-BSD FRML MDRD: >60 ML/MIN/1.73M2
GLUCOSE SERPL-MCNC: 155 MG/DL (ref 70–99)
HCT VFR BLD AUTO: 40.4 % (ref 36.3–47.1)
HGB BLD-MCNC: 13 G/DL (ref 11.9–15.1)
IMM GRANULOCYTES # BLD AUTO: <0.03 K/UL (ref 0–0.3)
IMM GRANULOCYTES NFR BLD: 0 %
KAPPA LC FREE SER-MCNC: 34.2 MG/L
LAMBDA LC FREE SERPL-MCNC: 32.4 MG/L (ref 4.2–27.7)
LYMPHOCYTES NFR BLD: 2.04 K/UL (ref 1.1–3.7)
LYMPHOCYTES RELATIVE PERCENT: 31 % (ref 24–43)
MCH RBC QN AUTO: 30.1 PG (ref 25.2–33.5)
MCHC RBC AUTO-ENTMCNC: 32.2 G/DL (ref 28.4–34.8)
MCV RBC AUTO: 93.5 FL (ref 82.6–102.9)
MONOCYTES NFR BLD: 0.56 K/UL (ref 0.1–1.2)
MONOCYTES NFR BLD: 8 % (ref 3–12)
NEUTROPHILS NFR BLD: 55 % (ref 36–65)
NEUTS SEG NFR BLD: 3.62 K/UL (ref 1.5–8.1)
NRBC BLD-RTO: 0 PER 100 WBC
PLATELET # BLD AUTO: 260 K/UL (ref 138–453)
PMV BLD AUTO: 10.6 FL (ref 8.1–13.5)
POTASSIUM SERPL-SCNC: 4.2 MMOL/L (ref 3.7–5.3)
PROT SERPL-MCNC: 6.9 G/DL (ref 6.4–8.3)
RBC # BLD AUTO: 4.32 M/UL (ref 3.95–5.11)
SODIUM SERPL-SCNC: 140 MMOL/L (ref 135–144)
WBC OTHER # BLD: 6.7 K/UL (ref 3.5–11.3)

## 2024-03-20 PROCEDURE — 36415 COLL VENOUS BLD VENIPUNCTURE: CPT

## 2024-03-20 PROCEDURE — 80053 COMPREHEN METABOLIC PANEL: CPT

## 2024-03-20 PROCEDURE — 85025 COMPLETE CBC W/AUTO DIFF WBC: CPT

## 2024-03-20 PROCEDURE — 86334 IMMUNOFIX E-PHORESIS SERUM: CPT

## 2024-03-20 PROCEDURE — 82232 ASSAY OF BETA-2 PROTEIN: CPT

## 2024-03-20 PROCEDURE — 82784 ASSAY IGA/IGD/IGG/IGM EACH: CPT

## 2024-03-20 PROCEDURE — 83521 IG LIGHT CHAINS FREE EACH: CPT

## 2024-03-20 PROCEDURE — 84165 PROTEIN E-PHORESIS SERUM: CPT

## 2024-03-20 PROCEDURE — 84155 ASSAY OF PROTEIN SERUM: CPT

## 2024-03-21 LAB
ALBUMIN PERCENT: 66 % (ref 45–65)
ALBUMIN SERPL-MCNC: 4.2 G/DL (ref 3.2–5.2)
ALPHA 2 PERCENT: 11 % (ref 6–13)
ALPHA1 GLOB SERPL ELPH-MCNC: 0.1 G/DL (ref 0.1–0.4)
ALPHA1 GLOB SERPL ELPH-MCNC: 2 % (ref 3–6)
ALPHA2 GLOB SERPL ELPH-MCNC: 0.7 G/DL (ref 0.5–0.9)
B-GLOBULIN SERPL ELPH-MCNC: 0.7 G/DL (ref 0.5–1.1)
B-GLOBULIN SERPL ELPH-MCNC: 11 % (ref 11–19)
GAMMA GLOB SERPL ELPH-MCNC: 0.7 G/DL (ref 0.5–1.5)
GAMMA GLOBULIN %: 10 % (ref 9–20)
IGA SERPL-MCNC: 148 MG/DL (ref 70–400)
IGG SERPL-MCNC: 930 MG/DL (ref 700–1600)
IGM SERPL-MCNC: <25 MG/DL (ref 40–230)
INTERPRETATION SERPL IFE-IMP: NORMAL
PATH REV: NORMAL
PATHOLOGIST: ABNORMAL
PROT PATTERN SERPL ELPH-IMP: ABNORMAL
PROT SERPL-MCNC: 6.3 G/DL (ref 6.6–8.7)
TOTAL PROT. SUM,%: 100 % (ref 98–102)
TOTAL PROT. SUM: 6.4 G/DL (ref 6.3–8.2)

## 2024-04-08 ENCOUNTER — HOSPITAL ENCOUNTER (OUTPATIENT)
Age: 59
Setting detail: SPECIMEN
Discharge: HOME | End: 2024-04-08
Payer: COMMERCIAL

## 2024-04-08 DIAGNOSIS — R19.7 DIARRHEA, UNSPECIFIED TYPE: ICD-10-CM

## 2024-04-08 LAB
C DIFF GDH + TOXINS A+B STL QL IA.RAPID: NEGATIVE
SPECIMEN DESCRIPTION: NORMAL

## 2024-04-08 PROCEDURE — 87324 CLOSTRIDIUM AG IA: CPT

## 2024-04-08 PROCEDURE — 87449 NOS EACH ORGANISM AG IA: CPT

## 2024-04-17 PROBLEM — R19.7 DIARRHEA OF PRESUMED INFECTIOUS ORIGIN: Status: ACTIVE | Noted: 2024-04-17

## 2024-05-13 ENCOUNTER — HOSPITAL ENCOUNTER (OUTPATIENT)
Age: 59
Discharge: HOME | End: 2024-05-13
Payer: COMMERCIAL

## 2024-05-13 DIAGNOSIS — R19.7 DIARRHEA, UNSPECIFIED TYPE: ICD-10-CM

## 2024-05-13 PROCEDURE — 82705 FATS/LIPIDS FECES QUAL: CPT

## 2024-05-13 PROCEDURE — 87329 GIARDIA AG IA: CPT

## 2024-05-13 PROCEDURE — 87328 CRYPTOSPORIDIUM AG IA: CPT

## 2024-05-13 PROCEDURE — 82653 EL-1 FECAL QUANTITATIVE: CPT

## 2024-05-13 PROCEDURE — 83993 ASSAY FOR CALPROTECTIN FECAL: CPT

## 2024-05-14 LAB
C PARVUM AG STL QL IA: NEGATIVE
G LAMBLIA AG STL QL IA: NEGATIVE
SOURCE: NORMAL
SPECIMEN DESCRIPTION: NORMAL

## 2024-05-15 LAB
CALPROTECTIN, FECAL: 315 UG/G
FAT QUALITATIVE SPLIT STOOL: NORMAL
FECAL NEUTRAL FAT: NORMAL

## 2024-05-16 LAB — FECAL PANCREATIC ELASTASE-1: 288 UG/G

## 2024-05-21 ENCOUNTER — HOSPITAL ENCOUNTER (OUTPATIENT)
Age: 59
Discharge: HOME | End: 2024-05-21
Payer: COMMERCIAL

## 2024-05-21 LAB
ALBUMIN SERPL-MCNC: 3.7 G/DL (ref 3.5–5.2)
ALBUMIN/GLOB SERPL: 1.4 {RATIO} (ref 1–2.5)
ALP SERPL-CCNC: 90 U/L (ref 35–104)
ALT SERPL-CCNC: 15 U/L (ref 5–33)
ANION GAP SERPL CALCULATED.3IONS-SCNC: 11 MMOL/L (ref 9–17)
AST SERPL-CCNC: 21 U/L
B2 MICROGLOB SERPL IA-MCNC: 2.6 MG/L (ref 0.8–2.4)
BASOPHILS # BLD: 0.05 K/UL (ref 0–0.2)
BASOPHILS NFR BLD: 1 % (ref 0–2)
BILIRUB SERPL-MCNC: 0.4 MG/DL (ref 0.3–1.2)
BUN SERPL-MCNC: 16 MG/DL (ref 6–20)
BUN/CREAT SERPL: 20 (ref 9–20)
CALCIUM SERPL-MCNC: 8.8 MG/DL (ref 8.6–10.4)
CHLORIDE SERPL-SCNC: 102 MMOL/L (ref 98–107)
CO2 SERPL-SCNC: 30 MMOL/L (ref 20–31)
CREAT SERPL-MCNC: 0.8 MG/DL (ref 0.5–0.9)
EOSINOPHIL # BLD: 0.37 K/UL (ref 0–0.44)
EOSINOPHILS RELATIVE PERCENT: 5 % (ref 1–4)
ERYTHROCYTE [DISTWIDTH] IN BLOOD BY AUTOMATED COUNT: 12.7 % (ref 11.8–14.4)
FREE KAPPA/LAMBDA RATIO: 1.02 (ref 0.22–1.74)
GFR, ESTIMATED: 85 ML/MIN/1.73M2
GLUCOSE SERPL-MCNC: 129 MG/DL (ref 70–99)
HCT VFR BLD AUTO: 39.7 % (ref 36.3–47.1)
HGB BLD-MCNC: 12.7 G/DL (ref 11.9–15.1)
IGA SERPL-MCNC: 145 MG/DL (ref 70–400)
IGA SERPL-MCNC: 145 MG/DL (ref 70–400)
IGG SERPL-MCNC: 914 MG/DL (ref 700–1600)
IGM SERPL-MCNC: 31 MG/DL (ref 40–230)
IMM GRANULOCYTES # BLD AUTO: <0.03 K/UL (ref 0–0.3)
IMM GRANULOCYTES NFR BLD: 0 %
KAPPA LC FREE SER-MCNC: 28.8 MG/L
LAMBDA LC FREE SERPL-MCNC: 28.1 MG/L (ref 4.2–27.7)
LYMPHOCYTES NFR BLD: 2.45 K/UL (ref 1.1–3.7)
LYMPHOCYTES RELATIVE PERCENT: 34 % (ref 24–43)
MCH RBC QN AUTO: 29.6 PG (ref 25.2–33.5)
MCHC RBC AUTO-ENTMCNC: 32 G/DL (ref 28.4–34.8)
MCV RBC AUTO: 92.5 FL (ref 82.6–102.9)
MONOCYTES NFR BLD: 0.59 K/UL (ref 0.1–1.2)
MONOCYTES NFR BLD: 8 % (ref 3–12)
NEUTROPHILS NFR BLD: 52 % (ref 36–65)
NEUTS SEG NFR BLD: 3.76 K/UL (ref 1.5–8.1)
NRBC BLD-RTO: 0 PER 100 WBC
PLATELET # BLD AUTO: 311 K/UL (ref 138–453)
PMV BLD AUTO: 10.4 FL (ref 8.1–13.5)
POTASSIUM SERPL-SCNC: 2.9 MMOL/L (ref 3.7–5.3)
PROT SERPL-MCNC: 6.4 G/DL (ref 6.4–8.3)
RBC # BLD AUTO: 4.29 M/UL (ref 3.95–5.11)
SODIUM SERPL-SCNC: 143 MMOL/L (ref 135–144)
WBC OTHER # BLD: 7.2 K/UL (ref 3.5–11.3)

## 2024-05-21 PROCEDURE — 85025 COMPLETE CBC W/AUTO DIFF WBC: CPT

## 2024-05-21 PROCEDURE — 83521 IG LIGHT CHAINS FREE EACH: CPT

## 2024-05-21 PROCEDURE — 83516 IMMUNOASSAY NONANTIBODY: CPT

## 2024-05-21 PROCEDURE — 84165 PROTEIN E-PHORESIS SERUM: CPT

## 2024-05-21 PROCEDURE — 82784 ASSAY IGA/IGD/IGG/IGM EACH: CPT

## 2024-05-21 PROCEDURE — 36415 COLL VENOUS BLD VENIPUNCTURE: CPT

## 2024-05-21 PROCEDURE — 80053 COMPREHEN METABOLIC PANEL: CPT

## 2024-05-21 PROCEDURE — 82232 ASSAY OF BETA-2 PROTEIN: CPT

## 2024-05-21 PROCEDURE — 84155 ASSAY OF PROTEIN SERUM: CPT

## 2024-05-21 PROCEDURE — 86334 IMMUNOFIX E-PHORESIS SERUM: CPT

## 2024-05-22 LAB — TTG IGA SER IA-ACNC: 0.2 U/ML

## 2024-05-23 LAB
ALBUMIN PERCENT: 59 % (ref 45–65)
ALBUMIN SERPL-MCNC: 3.6 G/DL (ref 3.2–5.2)
ALPHA 2 PERCENT: 12 % (ref 6–13)
ALPHA1 GLOB SERPL ELPH-MCNC: 0.3 G/DL (ref 0.1–0.4)
ALPHA1 GLOB SERPL ELPH-MCNC: 5 % (ref 3–6)
ALPHA2 GLOB SERPL ELPH-MCNC: 0.7 G/DL (ref 0.5–0.9)
B-GLOBULIN SERPL ELPH-MCNC: 0.7 G/DL (ref 0.5–1.1)
B-GLOBULIN SERPL ELPH-MCNC: 11 % (ref 11–19)
GAMMA GLOB SERPL ELPH-MCNC: 0.8 G/DL (ref 0.5–1.5)
GAMMA GLOBULIN %: 13 % (ref 9–20)
ITYP INTERPRETATION: NORMAL
PATH REV: NORMAL
PATHOLOGIST: ABNORMAL
PROT PATTERN SERPL ELPH-IMP: ABNORMAL
PROT SERPL-MCNC: 6.2 G/DL (ref 6.6–8.7)
TOTAL PROT. SUM,%: 100 % (ref 98–102)
TOTAL PROT. SUM: 6.1 G/DL (ref 6.3–8.2)

## 2024-05-28 ENCOUNTER — HOSPITAL ENCOUNTER (OUTPATIENT)
Age: 59
Discharge: HOME | End: 2024-05-28
Payer: COMMERCIAL

## 2024-05-28 ENCOUNTER — HOSPITAL ENCOUNTER (EMERGENCY)
Age: 59
Discharge: HOME | End: 2024-05-28
Payer: COMMERCIAL

## 2024-05-28 VITALS
RESPIRATION RATE: 16 BRPM | TEMPERATURE: 97.5 F | BODY MASS INDEX: 26.52 KG/M2 | OXYGEN SATURATION: 93 % | DIASTOLIC BLOOD PRESSURE: 83 MMHG | HEIGHT: 68 IN | SYSTOLIC BLOOD PRESSURE: 152 MMHG | HEART RATE: 81 BPM | WEIGHT: 175 LBS

## 2024-05-28 DIAGNOSIS — I10 ESSENTIAL HYPERTENSION: ICD-10-CM

## 2024-05-28 DIAGNOSIS — E87.5 HYPERKALEMIA: Primary | ICD-10-CM

## 2024-05-28 LAB
ANION GAP SERPL CALCULATED.3IONS-SCNC: 11 MMOL/L (ref 9–17)
ANION GAP SERPL CALCULATED.3IONS-SCNC: 9 MMOL/L (ref 9–17)
ANION GAP SERPL CALCULATED.3IONS-SCNC: 9 MMOL/L (ref 9–17)
BASOPHILS # BLD: 0.05 K/UL (ref 0–0.2)
BASOPHILS NFR BLD: 1 % (ref 0–2)
BUN SERPL-MCNC: 19 MG/DL (ref 6–20)
BUN SERPL-MCNC: 19 MG/DL (ref 6–20)
BUN SERPL-MCNC: 20 MG/DL (ref 6–20)
BUN/CREAT SERPL: 20 (ref 9–20)
BUN/CREAT SERPL: 21 (ref 9–20)
BUN/CREAT SERPL: 21 (ref 9–20)
CALCIUM SERPL-MCNC: 8.8 MG/DL (ref 8.6–10.4)
CALCIUM SERPL-MCNC: 9.2 MG/DL (ref 8.6–10.4)
CALCIUM SERPL-MCNC: 9.3 MG/DL (ref 8.6–10.4)
CHLORIDE SERPL-SCNC: 101 MMOL/L (ref 98–107)
CHLORIDE SERPL-SCNC: 103 MMOL/L (ref 98–107)
CHLORIDE SERPL-SCNC: 104 MMOL/L (ref 98–107)
CO2 SERPL-SCNC: 24 MMOL/L (ref 20–31)
CO2 SERPL-SCNC: 25 MMOL/L (ref 20–31)
CO2 SERPL-SCNC: 27 MMOL/L (ref 20–31)
CREAT SERPL-MCNC: 0.9 MG/DL (ref 0.5–0.9)
CREAT SERPL-MCNC: 0.9 MG/DL (ref 0.5–0.9)
CREAT SERPL-MCNC: 1 MG/DL (ref 0.5–0.9)
EKG ATRIAL RATE: 56 BPM
EKG P AXIS: 31 DEGREES
EKG P-R INTERVAL: 148 MS
EKG Q-T INTERVAL: 392 MS
EKG QRS DURATION: 92 MS
EKG QTC CALCULATION (BAZETT): 378 MS
EKG R AXIS: -40 DEGREES
EKG T AXIS: 21 DEGREES
EKG VENTRICULAR RATE: 56 BPM
EOSINOPHIL # BLD: 0.22 K/UL (ref 0–0.44)
EOSINOPHILS RELATIVE PERCENT: 3 % (ref 1–4)
ERYTHROCYTE [DISTWIDTH] IN BLOOD BY AUTOMATED COUNT: 12.9 % (ref 11.8–14.4)
GFR, ESTIMATED: 65 ML/MIN/1.73M2
GFR, ESTIMATED: 74 ML/MIN/1.73M2
GFR, ESTIMATED: 74 ML/MIN/1.73M2
GLUCOSE BLD-MCNC: 115 MG/DL (ref 74–100)
GLUCOSE BLD-MCNC: 205 MG/DL (ref 74–100)
GLUCOSE SERPL-MCNC: 161 MG/DL (ref 70–99)
GLUCOSE SERPL-MCNC: 330 MG/DL (ref 70–99)
GLUCOSE SERPL-MCNC: 56 MG/DL (ref 70–99)
HCT VFR BLD AUTO: 42.6 % (ref 36.3–47.1)
HGB BLD-MCNC: 13.4 G/DL (ref 11.9–15.1)
IMM GRANULOCYTES # BLD AUTO: 0.03 K/UL (ref 0–0.3)
IMM GRANULOCYTES NFR BLD: 0 %
LYMPHOCYTES NFR BLD: 1.58 K/UL (ref 1.1–3.7)
LYMPHOCYTES RELATIVE PERCENT: 18 % (ref 24–43)
MCH RBC QN AUTO: 30 PG (ref 25.2–33.5)
MCHC RBC AUTO-ENTMCNC: 31.5 G/DL (ref 28.4–34.8)
MCV RBC AUTO: 95.5 FL (ref 82.6–102.9)
MONOCYTES NFR BLD: 0.47 K/UL (ref 0.1–1.2)
MONOCYTES NFR BLD: 5 % (ref 3–12)
NEUTROPHILS NFR BLD: 73 % (ref 36–65)
NEUTS SEG NFR BLD: 6.29 K/UL (ref 1.5–8.1)
NRBC BLD-RTO: 0 PER 100 WBC
PLATELET # BLD AUTO: 322 K/UL (ref 138–453)
PMV BLD AUTO: 11.1 FL (ref 8.1–13.5)
POTASSIUM SERPL-SCNC: 5.3 MMOL/L (ref 3.7–5.3)
POTASSIUM SERPL-SCNC: 6.3 MMOL/L (ref 3.7–5.3)
POTASSIUM SERPL-SCNC: 6.8 MMOL/L (ref 3.7–5.3)
RBC # BLD AUTO: 4.46 M/UL (ref 3.95–5.11)
SODIUM SERPL-SCNC: 136 MMOL/L (ref 135–144)
SODIUM SERPL-SCNC: 137 MMOL/L (ref 135–144)
SODIUM SERPL-SCNC: 140 MMOL/L (ref 135–144)
WBC OTHER # BLD: 8.6 K/UL (ref 3.5–11.3)

## 2024-05-28 PROCEDURE — 80048 BASIC METABOLIC PNL TOTAL CA: CPT

## 2024-05-28 PROCEDURE — 36415 COLL VENOUS BLD VENIPUNCTURE: CPT

## 2024-05-28 PROCEDURE — 96375 TX/PRO/DX INJ NEW DRUG ADDON: CPT

## 2024-05-28 PROCEDURE — 2500000003 HC RX 250 WO HCPCS

## 2024-05-28 PROCEDURE — 93010 ELECTROCARDIOGRAM REPORT: CPT | Performed by: INTERNAL MEDICINE

## 2024-05-28 PROCEDURE — 82947 ASSAY GLUCOSE BLOOD QUANT: CPT

## 2024-05-28 PROCEDURE — 6370000000 HC RX 637 (ALT 250 FOR IP)

## 2024-05-28 PROCEDURE — 93005 ELECTROCARDIOGRAM TRACING: CPT

## 2024-05-28 PROCEDURE — 85025 COMPLETE CBC W/AUTO DIFF WBC: CPT

## 2024-05-28 PROCEDURE — 99285 EMERGENCY DEPT VISIT HI MDM: CPT

## 2024-05-28 PROCEDURE — 2580000003 HC RX 258

## 2024-05-28 PROCEDURE — 96365 THER/PROPH/DIAG IV INF INIT: CPT

## 2024-05-28 RX ORDER — BUDESONIDE 3 MG/1
3 CAPSULE, COATED PELLETS ORAL EVERY MORNING
COMMUNITY
Start: 2024-05-20

## 2024-05-28 RX ORDER — CALCIUM GLUCONATE 10 MG/ML
1000 INJECTION, SOLUTION INTRAVENOUS ONCE
Status: COMPLETED | OUTPATIENT
Start: 2024-05-28 | End: 2024-05-28

## 2024-05-28 RX ORDER — GLUCAGON 1 MG/ML
1 KIT INJECTION PRN
Status: DISCONTINUED | OUTPATIENT
Start: 2024-05-28 | End: 2024-05-28 | Stop reason: HOSPADM

## 2024-05-28 RX ORDER — DEXTROSE MONOHYDRATE 100 MG/ML
INJECTION, SOLUTION INTRAVENOUS CONTINUOUS PRN
Status: DISCONTINUED | OUTPATIENT
Start: 2024-05-28 | End: 2024-05-28

## 2024-05-28 RX ORDER — DEXTROSE 25 % IN WATER 25 %
10 SYRINGE (ML) INTRAVENOUS ONCE
Status: DISCONTINUED | OUTPATIENT
Start: 2024-05-28 | End: 2024-05-28

## 2024-05-28 RX ADMIN — CALCIUM GLUCONATE 1000 MG: 10 INJECTION, SOLUTION INTRAVENOUS at 18:49

## 2024-05-28 RX ADMIN — INSULIN HUMAN 10 UNITS: 100 INJECTION, SOLUTION PARENTERAL at 18:20

## 2024-05-28 RX ADMIN — DEXTROSE 250 ML: 10 SOLUTION INTRAVENOUS at 18:26

## 2024-05-28 RX ADMIN — SODIUM ZIRCONIUM CYCLOSILICATE 10 G: 10 POWDER, FOR SUSPENSION ORAL at 18:16

## 2024-05-28 ASSESSMENT — ENCOUNTER SYMPTOMS
ABDOMINAL PAIN: 0
NAUSEA: 0
VOMITING: 0
DIARRHEA: 1
SHORTNESS OF BREATH: 0

## 2024-05-28 NOTE — DISCHARGE INSTRUCTIONS
Please discontinue taking potassium at home until you repeat blood work within the next few days to reevaluate your potassium level.

## 2024-05-28 NOTE — ED NOTES
Pt resting in stretcher, connected to cardiac monitor, EKG done.  No distress noted, pt comfortable.

## 2024-05-28 NOTE — ED PROVIDER NOTES
time.    Amount and/or Complexity of Data Reviewed  Labs: ordered.  ECG/medicine tests: ordered.    Risk  OTC drugs.  Prescription drug management.          EKG  EKG reviewed and interpreted by me showing sinus bradycardia with ventricular rate of 56 bpm.  Left axis deviation.  T wave inversion in lead III.  No STEMI.    All EKG's are interpreted by the Emergency Department Physician who either signs or Co-signs this chart in the absence of a cardiologist.    RADIOLOGY:         Interpretation per the Radiologist below, if available at the time of this note:    No orders to display         EMERGENCY DEPARTMENT COURSE:      ED Course as of 05/28/24 2022 Tue May 28, 2024   2001 She was updated with results of repeat BMP showing improved potassium.  She does have mild hypoglycemia at 56 at this time.  She does feel mildly diaphoretic.  She was given oral orange juice and apple juice and applesauce.  We will monitor her and recheck POC glucose. [SS]      ED Course User Index  [SS] Pasquale Baker DO       PROCEDURES:    Critical Care    Performed by: Pasquale Baker DO  Authorized by: Pasquale Baker DO    Critical care provider statement:     Critical care time (minutes):  35    Critical care time was exclusive of:  Separately billable procedures and treating other patients    Critical care was necessary to treat or prevent imminent or life-threatening deterioration of the following conditions:  Endocrine crisis and metabolic crisis    Critical care was time spent personally by me on the following activities:  Blood draw for specimens, development of treatment plan with patient or surrogate, evaluation of patient's response to treatment, examination of patient, ordering and performing treatments and interventions, ordering and review of laboratory studies, ordering and review of radiographic studies, re-evaluation of patient's condition and review of old charts    I assumed direction of critical care for this  Surgical Defect Length In Cm (Optional): 1.2

## 2024-06-03 ENCOUNTER — HOSPITAL ENCOUNTER (OUTPATIENT)
Age: 59
Setting detail: SPECIMEN
Discharge: HOME | End: 2024-06-03

## 2024-06-03 DIAGNOSIS — E87.5 HYPERKALEMIA: ICD-10-CM

## 2024-06-03 LAB
ANION GAP SERPL CALCULATED.3IONS-SCNC: 13 MMOL/L (ref 9–16)
BUN SERPL-MCNC: 26 MG/DL (ref 6–20)
CALCIUM SERPL-MCNC: 9.6 MG/DL (ref 8.6–10.4)
CHLORIDE SERPL-SCNC: 100 MMOL/L (ref 98–107)
CO2 SERPL-SCNC: 29 MMOL/L (ref 20–31)
CREAT SERPL-MCNC: 1 MG/DL (ref 0.5–0.9)
GFR, ESTIMATED: 65 ML/MIN/1.73M2
GLUCOSE SERPL-MCNC: 133 MG/DL (ref 74–99)
POTASSIUM SERPL-SCNC: 4.7 MMOL/L (ref 3.7–5.3)
SODIUM SERPL-SCNC: 142 MMOL/L (ref 136–145)

## 2024-07-15 PROBLEM — K52.838 OTHER MICROSCOPIC COLITIS: Status: ACTIVE | Noted: 2024-07-15

## 2024-07-26 ENCOUNTER — TELEPHONE (OUTPATIENT)
Dept: SURGERY | Age: 59
End: 2024-07-26

## 2024-07-26 NOTE — TELEPHONE ENCOUNTER
Patient saw GI at [facility] who recommended an EGD. Patient would like to have done at [facility] if possible. Spoke to Dr Connell who states she would be able to do procedure. LVM for patient to call office and schedule.

## 2024-07-26 NOTE — TELEPHONE ENCOUNTER
Lindsay Gutiérrez     1965        female    [address]  [facility] [address]                    Legal Guardian No  If yes, Name:       Skilled Facility No     If yes, Name:                                             Home Phone: [phone]         Cell Phone:    Telephone Information:   Mobile [phone]                                           Surgeon: Becki Surgery Date: 8/8/24                        Procedure: EGD  Duration:    Diagnosis: Celiac   CPT Codes:62592    Important Medical History:  In Epic    First Assistant   Special Inst/Equip/Implants: Regular    Nickel allergy  No  Latex Allergy: No      Cardiac Device:  No  If yes, need most recent pacemaker interrogation from Cardiologist:  Type of pacemaker:    Anesthesia:    MAC                       Admission Type:  Same Day                        Admit Prior to Day of Surgery: No    Case Location:  Ambulatory            Preadmission Testing:  Phone Call             PAT Date and Time:     Need Preop Cardiac Clearance:   Need Pre-op/Medical Clearance:    Does Patient have Cardiologist/physician? Name of Physician:       Special Needs Communication:  Carla Lift No    needed No

## 2024-08-01 ENCOUNTER — TELEPHONE (OUTPATIENT)
Dept: PREADMISSION TESTING | Age: 59
End: 2024-08-01

## 2024-08-01 NOTE — PROGRESS NOTES
Patient instructed on the pre-operative, intra-operative, and post-operative process. Patient instructed on NPO status. Medication instructions and pre operative instruction sheet reviewed over the phone. Instructed pt to take gabapentin, cymbalta, metoprolol, budesonide, and pepcid with a small sip of water prior to arriving to the hospital the day of surgery.

## 2024-08-05 ENCOUNTER — ANESTHESIA EVENT (OUTPATIENT)
Dept: OPERATING ROOM | Age: 59
End: 2024-08-05
Payer: COMMERCIAL

## 2024-08-08 ENCOUNTER — HOSPITAL ENCOUNTER (OUTPATIENT)
Age: 59
Setting detail: OUTPATIENT SURGERY
Discharge: HOME | End: 2024-08-08
Attending: SURGERY | Admitting: SURGERY
Payer: COMMERCIAL

## 2024-08-08 ENCOUNTER — ANESTHESIA (OUTPATIENT)
Dept: OPERATING ROOM | Age: 59
End: 2024-08-08
Payer: COMMERCIAL

## 2024-08-08 VITALS
OXYGEN SATURATION: 99 % | DIASTOLIC BLOOD PRESSURE: 72 MMHG | HEART RATE: 58 BPM | BODY MASS INDEX: 25.85 KG/M2 | SYSTOLIC BLOOD PRESSURE: 133 MMHG | TEMPERATURE: 96.7 F | HEIGHT: 68 IN | RESPIRATION RATE: 16 BRPM | WEIGHT: 170.6 LBS

## 2024-08-08 DIAGNOSIS — K90.0 CELIAC DISEASE: ICD-10-CM

## 2024-08-08 PROCEDURE — 2500000003 HC RX 250 WO HCPCS: Performed by: NURSE ANESTHETIST, CERTIFIED REGISTERED

## 2024-08-08 PROCEDURE — 7100000011 HC PHASE II RECOVERY - ADDTL 15 MIN: Performed by: SURGERY

## 2024-08-08 PROCEDURE — 2709999900 HC NON-CHARGEABLE SUPPLY: Performed by: SURGERY

## 2024-08-08 PROCEDURE — 3609012400 HC EGD TRANSORAL BIOPSY SINGLE/MULTIPLE: Performed by: SURGERY

## 2024-08-08 PROCEDURE — 6360000002 HC RX W HCPCS: Performed by: NURSE ANESTHETIST, CERTIFIED REGISTERED

## 2024-08-08 PROCEDURE — 3700000000 HC ANESTHESIA ATTENDED CARE: Performed by: SURGERY

## 2024-08-08 PROCEDURE — 43239 EGD BIOPSY SINGLE/MULTIPLE: CPT | Performed by: SURGERY

## 2024-08-08 PROCEDURE — 3700000001 HC ADD 15 MINUTES (ANESTHESIA): Performed by: SURGERY

## 2024-08-08 PROCEDURE — 2580000003 HC RX 258: Performed by: NURSE ANESTHETIST, CERTIFIED REGISTERED

## 2024-08-08 PROCEDURE — 7100000010 HC PHASE II RECOVERY - FIRST 15 MIN: Performed by: SURGERY

## 2024-08-08 PROCEDURE — 88305 TISSUE EXAM BY PATHOLOGIST: CPT

## 2024-08-08 RX ORDER — SODIUM CHLORIDE 0.9 % (FLUSH) 0.9 %
5-40 SYRINGE (ML) INJECTION EVERY 12 HOURS SCHEDULED
Status: CANCELLED | OUTPATIENT
Start: 2024-08-08

## 2024-08-08 RX ORDER — LIDOCAINE HYDROCHLORIDE 20 MG/ML
INJECTION, SOLUTION EPIDURAL; INFILTRATION; INTRACAUDAL; PERINEURAL PRN
Status: DISCONTINUED | OUTPATIENT
Start: 2024-08-08 | End: 2024-08-08 | Stop reason: SDUPTHER

## 2024-08-08 RX ORDER — NALOXONE HYDROCHLORIDE 0.4 MG/ML
INJECTION, SOLUTION INTRAMUSCULAR; INTRAVENOUS; SUBCUTANEOUS PRN
Status: CANCELLED | OUTPATIENT
Start: 2024-08-08

## 2024-08-08 RX ORDER — SODIUM CHLORIDE 9 MG/ML
INJECTION, SOLUTION INTRAVENOUS PRN
Status: CANCELLED | OUTPATIENT
Start: 2024-08-08

## 2024-08-08 RX ORDER — SUCRALFATE ORAL 1 G/10ML
1 SUSPENSION ORAL 2 TIMES DAILY
Qty: 280 ML | Refills: 0 | Status: SHIPPED | OUTPATIENT
Start: 2024-08-08 | End: 2024-08-22

## 2024-08-08 RX ORDER — SODIUM CHLORIDE, SODIUM LACTATE, POTASSIUM CHLORIDE, CALCIUM CHLORIDE 600; 310; 30; 20 MG/100ML; MG/100ML; MG/100ML; MG/100ML
INJECTION, SOLUTION INTRAVENOUS CONTINUOUS
Status: DISCONTINUED | OUTPATIENT
Start: 2024-08-08 | End: 2024-08-08 | Stop reason: HOSPADM

## 2024-08-08 RX ORDER — PROPOFOL 10 MG/ML
INJECTION, EMULSION INTRAVENOUS PRN
Status: DISCONTINUED | OUTPATIENT
Start: 2024-08-08 | End: 2024-08-08 | Stop reason: SDUPTHER

## 2024-08-08 RX ORDER — SODIUM CHLORIDE 0.9 % (FLUSH) 0.9 %
5-40 SYRINGE (ML) INJECTION PRN
Status: CANCELLED | OUTPATIENT
Start: 2024-08-08

## 2024-08-08 RX ADMIN — LIDOCAINE HYDROCHLORIDE 100 MG: 20 INJECTION, SOLUTION EPIDURAL; INFILTRATION; INTRACAUDAL; PERINEURAL at 16:47

## 2024-08-08 RX ADMIN — PROPOFOL 40 MG: 10 INJECTION, EMULSION INTRAVENOUS at 16:52

## 2024-08-08 RX ADMIN — PROPOFOL 100 MG: 10 INJECTION, EMULSION INTRAVENOUS at 16:47

## 2024-08-08 RX ADMIN — PROPOFOL 200 MCG/KG/MIN: 10 INJECTION, EMULSION INTRAVENOUS at 16:48

## 2024-08-08 RX ADMIN — SODIUM CHLORIDE, POTASSIUM CHLORIDE, SODIUM LACTATE AND CALCIUM CHLORIDE: 600; 310; 30; 20 INJECTION, SOLUTION INTRAVENOUS at 14:57

## 2024-08-08 RX ADMIN — PROPOFOL 20 MG: 10 INJECTION, EMULSION INTRAVENOUS at 16:54

## 2024-08-08 ASSESSMENT — PAIN SCALES - GENERAL: PAINLEVEL_OUTOF10: 0

## 2024-08-08 ASSESSMENT — PAIN - FUNCTIONAL ASSESSMENT: PAIN_FUNCTIONAL_ASSESSMENT: NONE - DENIES PAIN

## 2024-08-08 NOTE — ANESTHESIA POSTPROCEDURE EVALUATION
Department of Anesthesiology  Postprocedure Note    Patient: Lindsay Gutiérrez  MRN: [id number]  YOB: 1965  Date of evaluation: 8/8/2024    Procedure Summary       Date: 08/08/24 Room / Location: [place] [address] / [facility]    Anesthesia Start: 1637 Anesthesia Stop: 1708    Procedure: ESOPHAGOGASTRODUODENOSCOPY BIOPSY Diagnosis:       Celiac disease      (Celiac disease [K90.0])    Surgeons: Doris Connell DO Responsible Provider: Tori Arguello APRN - CRNA    Anesthesia Type: general, TIVA ASA Status: 2            Anesthesia Type: No value filed.    John Phase I: John Score: 10    John Phase II: John Score: 10    Anesthesia Post Evaluation    Patient location during evaluation: bedside  Patient participation: complete - patient participated  Level of consciousness: awake and alert  Airway patency: patent  Nausea & Vomiting: no nausea and no vomiting  Cardiovascular status: hemodynamically stable  Respiratory status: acceptable  Hydration status: stable  Pain management: adequate    No notable events documented.

## 2024-08-08 NOTE — DISCHARGE INSTRUCTIONS
SAME DAY SURGERY DISCHARGE INSTRUCTIONS    1.  Do not drive or operate hazardous machinery for 24 hours.    2.  Do not make important personal or business decisions for 24 hours.    3.  Do not drink alcoholic beverages for 24 hours.    4.  Do not smoke tobacco products for 24 hours.    5.  Eat light foods (Jell-O, soups, etc....) and drink plenty of fluids (water, Sprite, etc...) up to 8 glasses per day, as you can tolerate.    6.  Limit your activities for 24 hours.  Do not engage in heavy work until your surgeon gives you permission.      7.  Call your surgeon for any questions regarding your surgery.    8.  Patient should not be left alone for 12-24 hours following surgical procedure.      ENDOSCOPY DISCHARGE INSTRUCTIONS:    You may have a mild sore throat; this should get better over the next day or two.  Sipping warm liquids, a salt-water gargle or throat lozenges may be used.  You may have some belching or a feeling of fullness in your abdomen.  This is from air that was put into your stomach during the procedure.  This should pass in a few hours.    May resume your regular diet.    You will receive a letter or phone call with your test results in 2 weeks.  If you have not received a letter or a phone call in 2 weeks please call the office for your results.    HOLD THE TUMS!      CALL THE DOCTOR IF YOU HAVE:    Chest pain or trouble breathing.    A hoarse voice or trouble swallowing    Bleeding, vomiting or spitting up of blood that is more than a few streaks or red or black stools    A fever above 101F or if you have chills    Pain that is worse or different than any pain you had before the procedure    Nausea or vomiting that lasts for more than 2 hours.       If symptoms are to severe call 911 or go to the nearest Emergency Room.

## 2024-08-08 NOTE — H&P
GENERAL SURGERY CONSULTATION      Patient's Name/ Date of Birth/ Gender: Lindsay MELO Aichholz / 1965 (59 y.o.) / female     PCP: Toy Solorio MD  Referring:     History of present Illness:  Patient is a pleasant 59 y.o. female  family history of celiac disease, also has history of gastric bypass surgery for weight loss, lost over 100 lbs, done at [facility]. She has been having epigastric issues, some pain, sometimes describes a globus sensation. Deneis melena. She saw GI at [facility]. She says she was placed on a PPI by Dr. Marquez after her bypass surgery. She says the GI CNP said to switch fromt he PPI and put her on pepcid. It was recommended to he to get an EGD, she wanted it done closer to home. She was referred to GI here but GI is not available for the next several weeks and she was then referred to general surgery. No dysphagia. Nonsmoker. Nondrinker. She is up to date on her colonoscopies. She has been on longer term steroids for microscopic colitis and says she will be weaning off of them soon. GI is managing that from [facility].     Past Medical History:  has a past medical history of Endometriosis, Headache(784.0), History of morbid obesity, Hyperlipidemia, Hypertension, Microscopic colitis, MRSA (methicillin resistant staph aureus) culture positive, Restless legs syndrome, and Type 2 diabetes mellitus (HCC).    Past Surgical History:   Past Surgical History:   Procedure Laterality Date    APPENDECTOMY      BUNIONECTOMY Left 2016     SECTION  ;     CHOLECYSTECTOMY      pt says had it out before her bypass gastric    COLONOSCOPY  2017    Dr. Black-hemorroids,(poor prep-repeat 3-5 years with 2 day prep)    CYSTOSCOPY Left 2019    CYSTOSCOPY URETEROSCOPY LASER-WITH HLL performed by José Read MD at [facility] OR    CYSTOSCOPY INSERTION / REMOVAL STENT / STONE Left 2019    CYSTOSCOPY STENT INSERTION WITH BASKET EXTRACTION OF STONE TO BLADDER performed by José  negative or noncontributory    Allergies: Celebrex [celecoxib], Lisinopril, and Nsaids    Current Meds:  Current Facility-Administered Medications:     lactated ringers IV soln infusion, , IntraVENous, Continuous, Elmer Lincoln APRN - CRNA, Last Rate: 100 mL/hr at 08/08/24 1457, New Bag at 08/08/24 1457    Physical Exam:  Vital signs and Nurse's note reviewed. BP (!) 151/77   Pulse 55   Temp (!) 96.7 °F (35.9 °C) (Temporal)   Resp 16   Ht 1.727 m (5' 8\")   Wt 77.4 kg (170 lb 9.6 oz)   SpO2 97%   BMI 25.94 kg/m²    height is 1.727 m (5' 8\") and weight is 77.4 kg (170 lb 9.6 oz). Her temporal temperature is 96.7 °F (35.9 °C) (abnormal). Her blood pressure is 151/77 (abnormal) and her pulse is 55. Her respiration is 16 and oxygen saturation is 97%.   Gen:  A&Ox3, NAD. Pleasant and cooperative.  HEENT: PERRLA, EOMI, no scleral icterus  Neck:  no goiter  CVS: Regular rate and rhythm  Resp: Good bilateral air entry, no active wheezing, no labored breathing  Abd: soft, non-tender, non-distended, bowel sounds present   Ext: Moves all extremities, no gross focal motor deficits  Skin: No erythema or ulcerations     Labs:   Lab Results   Component Value Date/Time    WBC 8.6 05/28/2024 05:03 PM    HGB 13.4 05/28/2024 05:03 PM    HCT 42.6 05/28/2024 05:03 PM    MCV 95.5 05/28/2024 05:03 PM     05/28/2024 05:03 PM     Lab Results   Component Value Date/Time     06/03/2024 10:00 AM    K 4.7 06/03/2024 10:00 AM     06/03/2024 10:00 AM    CO2 29 06/03/2024 10:00 AM    BUN 26 06/03/2024 10:00 AM    CREATININE 1.0 06/03/2024 10:00 AM    GLUCOSE 133 06/03/2024 10:00 AM    GLUCOSE 172 09/11/2023 11:52 AM    CALCIUM 9.6 06/03/2024 10:00 AM     Lab Results   Component Value Date/Time    ALKPHOS 90 05/21/2024 11:43 AM    ALT 15 05/21/2024 11:43 AM    AST 21 05/21/2024 11:43 AM    BILITOT 0.4 05/21/2024 11:43 AM    LABALBU Detected 07/12/2019 11:21 AM     Lab Results   Component Value Date/Time    AMYLASE 89

## 2024-08-08 NOTE — OP NOTE
Operative Note      Patient: Lindsay Gutiérrez  YOB: 1965  MRN: [id number]  Toy Solorio MD      Date of Procedure: 8/8/2024    Pre-Op Diagnosis Codes:     * Celiac disease [K90.0]    Post-Op Diagnosis: {MH OR SAME:773654012}       Procedure(s):  ESOPHAGOGASTRODUODENOSCOPY BIOPSY    Surgeon(s):  Doris Connell DO    Assistant:   * No surgical staff found *    Anesthesia: Monitor Anesthesia Care    Estimated Blood Loss (mL): {NUMBERS; EBL:31715}    Complications: {Symptoms; Intra-op complications:82954}    Specimens:   ID Type Source Tests Collected by Time Destination   A : Gastric Bx Please check for celiac disease and H pylori Tissue Stomach SURGICAL PATHOLOGY Doris Connell,  8/8/2024 1650    B : Small bowel Tissue Colon SURGICAL PATHOLOGY Doris Connell,  8/8/2024 1659        Implants:  * No implants in log *      Drains: * No LDAs found *    Findings:  Infection Present At Time Of Surgery (PATOS) (choose all levels that have infection present):  {PATOS LEVELS:446414158}  Other Findings:       Please see H&P for indications. The patient was positioned appropriately and placed under monitored anesthesia. Protective bite block was placed. Time out called procedure confirmed. The lubricated gastroscope was carefully inserted into the oropharynx and advanced under direct vision into the esophagus, the stomach, through the pylorus, and into the 1st and second portions of the duodenum.  The scope was withdrawn into the stomach. the scope was retroflexed in the stomach.    Findings:    Esophagus: ***    GE junction: ***    Stomach: retroflexion: ***    Cardia, body, antrum: ***    Pylorus: ***    Duodenum: bulb and sweep: ***  Limbs: gastric bypass ***  Marginal ulcers - The diagnosis of marginal ulcer is usually confirmed with endoscopic visualization. Marginal ulcers occur at an anastomosis and are typically located on the posterior aspect of the jejunal side of the gastrojejunal

## 2024-08-08 NOTE — H&P
GENERAL SURGERY CONSULTATION      Patient's Name/ Date of Birth/ Gender: Lindsay MELO Yajairacristofer / 1965 (59 y.o.) / female     PCP: Toy Solorio MD  Referring:     History of present Illness:  Patient is a pleasant 59 y.o. female      Past Medical History:  has a past medical history of Endometriosis, Headache(784.0), Hyperlipidemia, Hypertension, Microscopic colitis, MRSA (methicillin resistant staph aureus) culture positive, Obesity, Restless legs syndrome, and Type 2 diabetes mellitus (HCC).    Past Surgical History:   Past Surgical History:   Procedure Laterality Date    APPENDECTOMY      BUNIONECTOMY Left 2016     SECTION  ;     COLONOSCOPY  2017    Dr. Black-hemorroids,(poor prep-repeat 3-5 years with 2 day prep)    CYSTOSCOPY Left 2019    CYSTOSCOPY URETEROSCOPY LASER-WITH HLL performed by José Read MD at [facility] OR    CYSTOSCOPY INSERTION / REMOVAL STENT / STONE Left 2019    CYSTOSCOPY STENT INSERTION WITH BASKET EXTRACTION OF STONE TO BLADDER performed by José Read MD at [facility] OR    DILATION AND CURETTAGE OF UTERUS      FOOT SURGERY Left 10/11/2016    Resection infected non-union Lt. Great toe; insertion antibx bone cement plug    FOOT SURGERY Left 2016    total arthroplasty with implant big toe    GASTRIC BYPASS SURGERY      HYSTERECTOMY, TOTAL ABDOMINAL (CERVIX REMOVED)      KNEE ARTHROSCOPY Right     OVARY REMOVAL      NM COLON CA SCRN NOT HI RSK IND N/A 2017    COLONOSCOPY performed by Payam Black MD at [facility] OR    NM OFFICE/OUTPT VISIT,PROCEDURE ONLY Left 2018    FOOT BONE EXCISION-3RD TOE performed by James Jensen DPM at [facility] OR    NM PHALANGECTOMY TOE EACH TOE Right 2018    TOE AMPUTATION performed by James Jensen DPM at [facility] OR    NM SECONDARY CLOSURE SURG WOUND/DEHSN EXTSV/COMPLIC Right 2018    FOOT FLAP CLOSURE-DELAYED PRIMARY    AND DEBRIDEMENT OF THIRD TOE LEFT FOOT performed by

## 2024-08-08 NOTE — ANESTHESIA PRE PROCEDURE
Department of Anesthesiology  Preprocedure Note       Name:  Lindsay Gutiérrez   Age:  59 y.o.  :  1965                                          MRN:  [id number]         Date:  2024      Surgeon: Surgeon(s):  Doris Connell DO    Procedure: Procedure(s):  ESOPHAGOGASTRODUODENOSCOPY    Medications prior to admission:   Prior to Admission medications    Medication Sig Start Date End Date Taking? Authorizing Provider   allopurinol (ZYLOPRIM) 100 MG tablet Take 1 tablet by mouth daily 24   Toy Solorio MD   famotidine (PEPCID) 40 MG tablet Take 1 tablet by mouth daily 24  Joanne Holland MD   gabapentin (NEURONTIN) 600 MG tablet Take 1 tablet by mouth 2 times daily for 30 days. 24  Toy Solorio MD   glipiZIDE (GLUCOTROL XL) 2.5 MG extended release tablet Take 1 tablet by mouth daily    Joanne Holland MD   budesonide (ENTOCORT EC) 3 MG delayed release capsule Take 1 capsule by mouth every morning 24   Joanne Holland MD   metoprolol succinate (TOPROL XL) 50 MG extended release tablet Take 1 tablet by mouth 2 times daily 23   Toy Solorio MD   NONFORMULARY Take by mouth daily Bariatric vitamin    ProviderJoanne MD   ASPIRIN ADULT LOW STRENGTH 81 MG EC tablet Take 1 tablet by mouth daily 19   Joanne Holland MD   DULoxetine (CYMBALTA) 60 MG extended release capsule Take 1 capsule by mouth daily    ProviderJoanne MD       Current medications:    Current Facility-Administered Medications   Medication Dose Route Frequency Provider Last Rate Last Admin    lactated ringers IV soln infusion   IntraVENous Continuous Elmer Lincoln APRN - CRNA 100 mL/hr at 24 1457 New Bag at 24 1457       Allergies:    Allergies   Allergen Reactions    Celebrex [Celecoxib] Hives    Lisinopril Cough    Nsaids Nausea And Vomiting     Too harsh for stomach       Problem List:    Patient Active Problem List   Diagnosis Code

## 2024-08-12 LAB — SURGICAL PATHOLOGY REPORT: NORMAL

## 2024-08-14 NOTE — RESULT ENCOUNTER NOTE
So the biopsies confirm the ulcer seen on EGD. I put her on carafate. She was on PPI then GI in [place] switched her to pepcid. I need to repeat her EGD in 3-4 months in [place]. Deedee I would like to see her in the clinic to discuss before October, please have her come in September, it wont take long , about 10 minutes, I want to make sure she doesn't get lost to follow up with GI seen in [place], then GI here gone, then seeing me, etc etc. She may need more carafate too.

## 2024-08-16 ENCOUNTER — TELEPHONE (OUTPATIENT)
Dept: SURGERY | Age: 59
End: 2024-08-16

## 2024-08-16 NOTE — TELEPHONE ENCOUNTER
Patient aware and scheduled. She wanted to know if the Carafate would cause issues with her blood sugar. She stated she was also on prednisone. I explained to her that the prednisone was most likely the reason for this. Patient will discuss with endocrinology next week. Per Dr Connell, she agrees that Carafate is not the reason for the sugar issues.

## 2024-08-16 NOTE — TELEPHONE ENCOUNTER
----- Message from Dr. Doris Connell, DO sent at 8/14/2024 11:50 AM EDT -----  So the biopsies confirm the ulcer seen on EGD. I put her on carafate. She was on PPI then GI in [place] switched her to pepcid. I need to repeat her EGD in 3-4 months in [place]. Deedee I would like to see her in the clinic to discuss before October, please have her come in September, it wont take long , about 10 minutes, I want to make sure she doesn't get lost to follow up with GI seen in [place], then GI here gone, then seeing me, etc etc. She may need more carafate too.

## 2024-09-11 ENCOUNTER — OFFICE VISIT (OUTPATIENT)
Dept: SURGERY | Age: 59
End: 2024-09-11
Payer: COMMERCIAL

## 2024-09-11 ENCOUNTER — TELEPHONE (OUTPATIENT)
Dept: SURGERY | Age: 59
End: 2024-09-11

## 2024-09-11 DIAGNOSIS — K28.9 GJU (GASTROJEJUNAL ULCER): Primary | ICD-10-CM

## 2024-09-11 PROCEDURE — 3079F DIAST BP 80-89 MM HG: CPT | Performed by: SURGERY

## 2024-09-11 PROCEDURE — 99213 OFFICE O/P EST LOW 20 MIN: CPT | Performed by: SURGERY

## 2024-09-11 PROCEDURE — 3075F SYST BP GE 130 - 139MM HG: CPT | Performed by: SURGERY

## 2024-09-11 NOTE — PROGRESS NOTES
Lindsay here to discuss her ulcer. Recent EGD , see below. She is back on the protonix instead of pepcid which is recommended given the findings and her surgical history. Recommend repeat EGD in 3-4 months to follow up on ulcer healing. Doing well. Continue PPI. No melena.     Plan:  REPEAT EGD IN 3-4 MONTHS. Already scheduled for this on 12/20/2024.      Collected: 08/08/24 1650   Lab status: Final   Resulting lab: [facility]   Value: Path Number: [id number]    -- Diagnosis --  A. STOMACH, BIOPSY:  Unremarkable gastric mucosa. No Helicobacter  organisms identified on H&E stain.     B. SMALL BOWEL, BIOPSY:  Ulcer with reactive changes.      Reba Patel M.D.  **Electronically Signed Out**        g2/8/12/2024     Clinical Information  Pre-Op Diagnosis:  CELIAC DISEASE  Operative Findings:  GASTRIC BX; SMALL BOWEL  Operation Performed:  EGD  mj         Operative Note        Patient: Lindsay Gutiérrez  YOB: 1965  MRN: [id number]  Toy Solorio MD        Date of Procedure: 8/8/2024     Pre-Op Diagnosis Codes:      * Celiac disease [K90.0] family history.   Epigastric pain. History of gastric bypass.      Post-Op Diagnosis: Same. Small marginal ulcer GJ anastomosis       Procedure(s):  ESOPHAGOGASTRODUODENOSCOPY BIOPSY     Surgeon(s):  Doris Connell DO     Assistant:   * No surgical staff found *     Anesthesia: Monitor Anesthesia Care     Estimated Blood Loss (mL): n/a     Complications: None     Specimens:   ID Type Source Tests Collected by Time Destination   A : Gastric Bx Please check for celiac disease and H pylori Tissue Stomach SURGICAL PATHOLOGY Doris Connell DO 8/8/2024 1650     B : Small bowel Tissue Colon SURGICAL PATHOLOGY Doris Connell DO 8/8/2024 [id number]

## 2024-09-11 NOTE — PATIENT INSTRUCTIONS
SURVEY:    You may be receiving a survey from [facility] regarding your visit today.    You may get this in the mail, through your MyChart, or in your email.     Please complete the survey to enable us to provide the highest quality of care to you and your family.    If you cannot score us a very good (5 Stars) on any question, please call the office to discuss how we could of made your experience exceptional.    Thank you!    MD Dr. Doris Craig, DO  Dr. Jasbir Gutierrez, DO    Dr. Jet Gaytan, DO    MD Sadaf Ortega, APRN-MALATHI Mark, LUCIA Cristina LPN Jena Adams, MA Emily Akers, MA    Phone: [phone]  Fax: [phone]    Office Hours:   M-TH 8-5, F: 8-12

## 2024-09-18 ENCOUNTER — HOSPITAL ENCOUNTER (OUTPATIENT)
Age: 59
Setting detail: SPECIMEN
Discharge: HOME | End: 2024-09-18

## 2024-09-18 LAB
ALBUMIN SERPL-MCNC: 4.2 G/DL (ref 3.5–5.2)
ALBUMIN/GLOB SERPL: 2 {RATIO} (ref 1–2.5)
ALP SERPL-CCNC: 94 U/L (ref 35–104)
ALT SERPL-CCNC: 15 U/L (ref 10–35)
ANION GAP SERPL CALCULATED.3IONS-SCNC: 11 MMOL/L (ref 9–16)
AST SERPL-CCNC: 23 U/L (ref 10–35)
B2 MICROGLOB SERPL IA-MCNC: 4.8 MG/L
BASOPHILS # BLD: 0.07 K/UL (ref 0–0.2)
BASOPHILS NFR BLD: 1 % (ref 0–2)
BILIRUB SERPL-MCNC: 0.4 MG/DL (ref 0–1.2)
BUN SERPL-MCNC: 19 MG/DL (ref 8–23)
CALCIUM SERPL-MCNC: 9 MG/DL (ref 8.6–10.4)
CHLORIDE SERPL-SCNC: 102 MMOL/L (ref 98–107)
CO2 SERPL-SCNC: 27 MMOL/L (ref 20–31)
CREAT SERPL-MCNC: 1.1 MG/DL (ref 0.5–0.9)
EOSINOPHIL # BLD: 0.37 K/UL (ref 0–0.44)
EOSINOPHILS RELATIVE PERCENT: 7 % (ref 1–4)
ERYTHROCYTE [DISTWIDTH] IN BLOOD BY AUTOMATED COUNT: 12.8 % (ref 11.8–14.4)
FREE KAPPA/LAMBDA RATIO: 0.9 (ref 0.22–1.74)
GFR, ESTIMATED: 57 ML/MIN/1.73M2
GLUCOSE SERPL-MCNC: 122 MG/DL (ref 74–99)
HCT VFR BLD AUTO: 41 % (ref 36.3–47.1)
HGB BLD-MCNC: 13.1 G/DL (ref 11.9–15.1)
IGA SERPL-MCNC: 152 MG/DL (ref 70–400)
IGG SERPL-MCNC: 916 MG/DL (ref 700–1600)
IGM SERPL-MCNC: 34 MG/DL (ref 40–230)
IMM GRANULOCYTES # BLD AUTO: <0.03 K/UL (ref 0–0.3)
IMM GRANULOCYTES NFR BLD: 0 %
KAPPA LC FREE SER-MCNC: 37.6 MG/L
LAMBDA LC FREE SERPL-MCNC: 41.6 MG/L (ref 4.2–27.7)
LYMPHOCYTES NFR BLD: 1.81 K/UL (ref 1.1–3.7)
LYMPHOCYTES RELATIVE PERCENT: 32 % (ref 24–43)
MCH RBC QN AUTO: 30 PG (ref 25.2–33.5)
MCHC RBC AUTO-ENTMCNC: 32 G/DL (ref 28.4–34.8)
MCV RBC AUTO: 94 FL (ref 82.6–102.9)
MONOCYTES NFR BLD: 0.7 K/UL (ref 0.1–1.2)
MONOCYTES NFR BLD: 12 % (ref 3–12)
NEUTROPHILS NFR BLD: 48 % (ref 36–65)
NEUTS SEG NFR BLD: 2.76 K/UL (ref 1.5–8.1)
NRBC BLD-RTO: 0 PER 100 WBC
PLATELET # BLD AUTO: 250 K/UL (ref 138–453)
PMV BLD AUTO: 11.8 FL (ref 8.1–13.5)
POTASSIUM SERPL-SCNC: 4.2 MMOL/L (ref 3.7–5.3)
PROT SERPL-MCNC: 6.7 G/DL (ref 6.6–8.7)
RBC # BLD AUTO: 4.36 M/UL (ref 3.95–5.11)
SODIUM SERPL-SCNC: 140 MMOL/L (ref 136–145)
WBC OTHER # BLD: 5.7 K/UL (ref 3.5–11.3)

## 2024-09-19 LAB
ALBUMIN PERCENT: 60 % (ref 45–65)
ALBUMIN SERPL-MCNC: 3.8 G/DL (ref 3.2–5.2)
ALPHA 2 PERCENT: 12 % (ref 6–13)
ALPHA1 GLOB SERPL ELPH-MCNC: 0.3 G/DL (ref 0.1–0.4)
ALPHA1 GLOB SERPL ELPH-MCNC: 5 % (ref 3–6)
ALPHA2 GLOB SERPL ELPH-MCNC: 0.8 G/DL (ref 0.5–0.9)
B-GLOBULIN SERPL ELPH-MCNC: 0.8 G/DL (ref 0.5–1.1)
B-GLOBULIN SERPL ELPH-MCNC: 12 % (ref 11–19)
GAMMA GLOB SERPL ELPH-MCNC: 0.8 G/DL (ref 0.5–1.5)
GAMMA GLOBULIN %: 12 % (ref 9–20)
ITYP INTERPRETATION: NORMAL
PATH REV: NORMAL
PATHOLOGIST: ABNORMAL
PROT PATTERN SERPL ELPH-IMP: ABNORMAL
PROT SERPL-MCNC: 6.4 G/DL (ref 6.6–8.7)
TOTAL PROT. SUM,%: 101 % (ref 98–102)
TOTAL PROT. SUM: 6.5 G/DL (ref 6.3–8.2)

## 2024-09-23 RX ORDER — SUCRALFATE ORAL 1 G/10ML
SUSPENSION ORAL
Qty: 280 ML | Refills: 0 | Status: SHIPPED | OUTPATIENT
Start: 2024-09-23

## 2024-10-15 VITALS
DIASTOLIC BLOOD PRESSURE: 84 MMHG | HEART RATE: 59 BPM | RESPIRATION RATE: 18 BRPM | OXYGEN SATURATION: 98 % | WEIGHT: 179.6 LBS | BODY MASS INDEX: 27.22 KG/M2 | HEIGHT: 68 IN | SYSTOLIC BLOOD PRESSURE: 132 MMHG

## 2024-10-15 RX ORDER — PANTOPRAZOLE SODIUM 40 MG/1
40 TABLET, DELAYED RELEASE ORAL DAILY
COMMUNITY

## 2024-11-12 ENCOUNTER — HOSPITAL ENCOUNTER (OUTPATIENT)
Age: 59
Setting detail: SPECIMEN
Discharge: HOME | End: 2024-11-12

## 2024-11-12 LAB
BASOPHILS # BLD: 0.07 K/UL (ref 0–0.2)
BASOPHILS NFR BLD: 1 % (ref 0–2)
EOSINOPHIL # BLD: 0.59 K/UL (ref 0–0.44)
EOSINOPHILS RELATIVE PERCENT: 9 % (ref 1–4)
ERYTHROCYTE [DISTWIDTH] IN BLOOD BY AUTOMATED COUNT: 12.8 % (ref 11.8–14.4)
HCT VFR BLD AUTO: 43.5 % (ref 36.3–47.1)
HGB BLD-MCNC: 13.6 G/DL (ref 11.9–15.1)
IMM GRANULOCYTES # BLD AUTO: <0.03 K/UL (ref 0–0.3)
IMM GRANULOCYTES NFR BLD: 0 %
LYMPHOCYTES NFR BLD: 2.11 K/UL (ref 1.1–3.7)
LYMPHOCYTES RELATIVE PERCENT: 31 % (ref 24–43)
MCH RBC QN AUTO: 30.4 PG (ref 25.2–33.5)
MCHC RBC AUTO-ENTMCNC: 31.3 G/DL (ref 28.4–34.8)
MCV RBC AUTO: 97.1 FL (ref 82.6–102.9)
MONOCYTES NFR BLD: 0.51 K/UL (ref 0.1–1.2)
MONOCYTES NFR BLD: 8 % (ref 3–12)
NEUTROPHILS NFR BLD: 51 % (ref 36–65)
NEUTS SEG NFR BLD: 3.46 K/UL (ref 1.5–8.1)
NRBC BLD-RTO: 0 PER 100 WBC
PLATELET # BLD AUTO: 270 K/UL (ref 138–453)
PMV BLD AUTO: 11.8 FL (ref 8.1–13.5)
RBC # BLD AUTO: 4.48 M/UL (ref 3.95–5.11)
WBC OTHER # BLD: 6.8 K/UL (ref 3.5–11.3)

## 2024-11-13 LAB
25(OH)D3 SERPL-MCNC: 38.1 NG/ML (ref 30–100)
ALBUMIN SERPL-MCNC: 4.3 G/DL (ref 3.5–5.2)
ALBUMIN/GLOB SERPL: 1.7 {RATIO} (ref 1–2.5)
ALP SERPL-CCNC: 84 U/L (ref 35–104)
ALT SERPL-CCNC: 16 U/L (ref 10–35)
ANION GAP SERPL CALCULATED.3IONS-SCNC: 11 MMOL/L (ref 9–16)
AST SERPL-CCNC: 21 U/L (ref 10–35)
BILIRUB SERPL-MCNC: 0.5 MG/DL (ref 0–1.2)
BUN SERPL-MCNC: 21 MG/DL (ref 8–23)
CALCIUM SERPL-MCNC: 9.2 MG/DL (ref 8.6–10.4)
CHLORIDE SERPL-SCNC: 101 MMOL/L (ref 98–107)
CO2 SERPL-SCNC: 28 MMOL/L (ref 20–31)
CREAT SERPL-MCNC: 1 MG/DL (ref 0.6–0.9)
FOLATE SERPL-MCNC: 38.2 NG/ML (ref 4.8–24.2)
GFR, ESTIMATED: 64 ML/MIN/1.73M2
GLUCOSE SERPL-MCNC: 154 MG/DL (ref 74–99)
IRON SATN MFR SERPL: 24 % (ref 20–55)
IRON SERPL-MCNC: 86 UG/DL (ref 37–145)
MAGNESIUM SERPL-MCNC: 1.6 MG/DL (ref 1.6–2.4)
PHOSPHATE SERPL-MCNC: 3.5 MG/DL (ref 2.5–4.5)
POTASSIUM SERPL-SCNC: 4.6 MMOL/L (ref 3.7–5.3)
PROT SERPL-MCNC: 6.8 G/DL (ref 6.6–8.7)
SODIUM SERPL-SCNC: 140 MMOL/L (ref 136–145)
TIBC SERPL-MCNC: 354 UG/DL (ref 250–450)
UNSATURATED IRON BINDING CAPACITY: 268 UG/DL (ref 112–347)
VIT B12 SERPL-MCNC: 1355 PG/ML (ref 232–1245)

## 2024-11-16 LAB — VIT B1 PYROPHOSHATE BLD-SCNC: 298 NMOL/L (ref 70–180)

## 2024-11-21 ENCOUNTER — TELEPHONE (OUTPATIENT)
Dept: SURGERY | Age: 59
End: 2024-11-21

## 2025-02-24 ENCOUNTER — HOSPITAL ENCOUNTER (OUTPATIENT)
Age: 60
Discharge: HOME | End: 2025-02-24

## 2025-02-25 LAB
EKG ATRIAL RATE: 58 BPM
EKG P AXIS: 25 DEGREES
EKG P-R INTERVAL: 140 MS
EKG Q-T INTERVAL: 420 MS
EKG QRS DURATION: 94 MS
EKG QTC CALCULATION (BAZETT): 412 MS
EKG R AXIS: -28 DEGREES
EKG T AXIS: 23 DEGREES
EKG VENTRICULAR RATE: 58 BPM

## 2025-07-18 PROBLEM — E11.621 DIABETIC ULCER OF TOE OF LEFT FOOT ASSOCIATED WITH TYPE 2 DIABETES MELLITUS, WITH MUSCLE INVOLVEMENT WITHOUT EVIDENCE OF NECROSIS (HCC): Status: RESOLVED | Noted: 2022-02-10 | Resolved: 2025-07-18

## 2025-07-18 PROBLEM — L97.525 DIABETIC ULCER OF TOE OF LEFT FOOT ASSOCIATED WITH TYPE 2 DIABETES MELLITUS, WITH MUSCLE INVOLVEMENT WITHOUT EVIDENCE OF NECROSIS (HCC): Status: RESOLVED | Noted: 2022-02-10 | Resolved: 2025-07-18

## 2025-07-18 PROBLEM — D47.2 MGUS (MONOCLONAL GAMMOPATHY OF UNKNOWN SIGNIFICANCE): Status: ACTIVE | Noted: 2019-08-14

## 2025-07-18 PROBLEM — R19.7 DIARRHEA OF PRESUMED INFECTIOUS ORIGIN: Status: RESOLVED | Noted: 2024-04-17 | Resolved: 2025-07-18

## 2025-08-01 ENCOUNTER — HOSPITAL ENCOUNTER (OUTPATIENT)
Dept: LAB | Age: 60
Discharge: HOME | End: 2025-08-01
Payer: COMMERCIAL

## 2025-08-01 LAB
FOLATE SERPL-MCNC: 26 NG/ML (ref 4.8–24.2)
VIT B12 SERPL-MCNC: 1865 PG/ML (ref 232–1245)

## 2025-08-01 PROCEDURE — 82607 VITAMIN B-12: CPT

## 2025-08-01 PROCEDURE — 82746 ASSAY OF FOLIC ACID SERUM: CPT

## 2025-08-01 PROCEDURE — 36415 COLL VENOUS BLD VENIPUNCTURE: CPT

## 2025-09-03 ENCOUNTER — HOSPITAL ENCOUNTER (OUTPATIENT)
Dept: LAB | Age: 60
Discharge: HOME | End: 2025-09-03
Payer: COMMERCIAL

## 2025-09-03 DIAGNOSIS — Z79.4 TYPE 2 DIABETES MELLITUS WITH DIABETIC POLYNEUROPATHY, WITH LONG-TERM CURRENT USE OF INSULIN (HCC): ICD-10-CM

## 2025-09-03 DIAGNOSIS — E78.2 MIXED HYPERLIPIDEMIA: ICD-10-CM

## 2025-09-03 DIAGNOSIS — E11.42 TYPE 2 DIABETES MELLITUS WITH DIABETIC POLYNEUROPATHY, WITH LONG-TERM CURRENT USE OF INSULIN (HCC): ICD-10-CM

## 2025-09-03 LAB
CHOLEST SERPL-MCNC: 201 MG/DL (ref 0–199)
CHOLESTEROL/HDL RATIO: 2.7
CREAT UR-MCNC: 102 MG/DL (ref 28–217)
FOLATE SERPL-MCNC: 35.1 NG/ML (ref 4.8–24.2)
HDLC SERPL-MCNC: 74 MG/DL
LDLC SERPL CALC-MCNC: 101 MG/DL (ref 0–100)
MICROALBUMIN UR-MCNC: 125 MG/L (ref 0–20)
MICROALBUMIN/CREAT UR-RTO: 123 MCG/MG CREAT (ref 0–25)
TRIGL SERPL-MCNC: 131 MG/DL (ref 0–149)
VIT B12 SERPL-MCNC: >2000 PG/ML (ref 232–1245)
VLDLC SERPL CALC-MCNC: 26 MG/DL (ref 1–30)

## 2025-09-03 PROCEDURE — 82570 ASSAY OF URINE CREATININE: CPT

## 2025-09-03 PROCEDURE — 82746 ASSAY OF FOLIC ACID SERUM: CPT

## 2025-09-03 PROCEDURE — 80061 LIPID PANEL: CPT

## 2025-09-03 PROCEDURE — 36415 COLL VENOUS BLD VENIPUNCTURE: CPT

## 2025-09-03 PROCEDURE — 82607 VITAMIN B-12: CPT

## 2025-09-03 PROCEDURE — 82043 UR ALBUMIN QUANTITATIVE: CPT

## (undated) DEVICE — BLADE SURG NO10 C STL STR DISP GLASSVAN

## (undated) DEVICE — XEROFORM OCCLUSIVE GAUZE STRIP OVERWRAP, 3% BISMUTH TRIBROMOPHENATE IN PETROLATUM BLEND: Brand: XEROFORM

## (undated) DEVICE — TOWEL SURG SM W12XL18IN CLR PLAS TEAR RESIST REINF ADH FRST

## (undated) DEVICE — OCCLUSIVE GAUZE STRIP,3% BISMUTH TRIBROMOPHENATE IN PETROLATUM BLEND: Brand: XEROFORM

## (undated) DEVICE — SUTURE VCRL + SZ 3-0 L27IN ABSRB UD L26MM SH 1/2 CIR VCP416H

## (undated) DEVICE — SOLUTION SURG PREP ANTIMICROBIAL 4 OZ SKIN WND EXIDINE

## (undated) DEVICE — INTENDED FOR TISSUE SEPARATION, AND OTHER PROCEDURES THAT REQUIRE A SHARP SURGICAL BLADE TO PUNCTURE OR CUT.: Brand: BARD-PARKER ® CARBON RIB-BACK BLADES

## (undated) DEVICE — PRECISION THIN (9.0 X 0.38 X 18.5MM)

## (undated) DEVICE — MEDI-VAC NON-CONDUCTIVE TUBING7MM X 30.5 (100FT): Brand: CARDINAL HEALTH

## (undated) DEVICE — CANNULA IV 18GA L15IN BLNT FILL LUERLOCK HUB MJCT

## (undated) DEVICE — Device

## (undated) DEVICE — GLOVE ORANGE PI 8   MSG9080

## (undated) DEVICE — SOLUTION IV IRRIG POUR BRL 0.9% SODIUM CHL 2F7124

## (undated) DEVICE — SPONGE GZ W4XL4IN CELOS POSTOP AVANT

## (undated) DEVICE — Z DISCONTINUED BY MEDLINE USE 2711682 TRAY SKIN PREP DRY W/ PREM GLV

## (undated) DEVICE — NEEDLE 25GAX5.0MM INJ CARR LOCKE

## (undated) DEVICE — SYRINGE MED 20ML STD CLR PLAS LUERLOCK TIP N CTRL DISP

## (undated) DEVICE — GOWN,AURORA,NON-REINFORCED,2XL: Brand: MEDLINE

## (undated) DEVICE — SPONGE GZ W4XL4IN COT 12 PLY TYP VII WVN C FLD DSGN

## (undated) DEVICE — SUTURE ETHLN SZ 4-0 L18IN NONABSORBABLE BLK L19MM PS-2 3/8 1667H

## (undated) DEVICE — PRECISION THIN (5.5 X 0.38 X 18.0MM)

## (undated) DEVICE — SYRINGE, LUER LOCK, 10ML: Brand: MEDLINE

## (undated) DEVICE — TUBING SUCT REG TIP CAP VYN W TROL VENT MAL W SMOOTH INNR

## (undated) DEVICE — FAN SPRAY KIT: Brand: PULSAVAC®

## (undated) DEVICE — SOLUTION IV IRRIG WATER 1000ML POUR BRL 2F7114

## (undated) DEVICE — Z INACTIVE USE 2660664 SOLUTION IRRIG 3000ML 0.9% SOD CHL USP UROMATIC PLAS CONT

## (undated) DEVICE — SUTURE VCRL + SZ 2-0 L27IN ABSRB VLT SH 1/2 CIR TAPERPOINT VCP317H

## (undated) DEVICE — CANNULA ORAL NSL AD CO2 N INTUB O2 DEL DISP TRU LNK

## (undated) DEVICE — TUBING, SUCTION, 9/32" X 12', STRAIGHT: Brand: MEDLINE INDUSTRIES, INC.

## (undated) DEVICE — GLOVE ORANGE PI 7 1/2   MSG9075

## (undated) DEVICE — FORCEP SPEC RETRV BX AD 2 MMX155 CM 5 MM GI OVL CUP W/ NDL

## (undated) DEVICE — SUTURE ETHLN SZ 3-0 L18IN NONABSORBABLE BLK L24MM PS-1 3/8 1663G

## (undated) DEVICE — TUBING SUCT NON-STRL 9/32X100 W/CNNT

## (undated) DEVICE — SUTURE VCRL + SZ 2-0 L27IN ABSRB WHT SH 1/2 CIR TAPERCUT VCP417H

## (undated) DEVICE — HAND AND FT PK

## (undated) DEVICE — ADAPTER URO SCP UROLOK LL

## (undated) DEVICE — SINGLE ACTION PUMPING SYSTEM

## (undated) DEVICE — STERILE POLYISOPRENE POWDER-FREE SURGICAL GLOVES: Brand: PROTEXIS

## (undated) DEVICE — 1000 S-DRAPE TOWEL DRAPE 10/BX: Brand: STERI-DRAPE™

## (undated) DEVICE — NEEDLE HYPO 25GA L1.5IN BLU POLYPR HUB S STL REG BVL STR

## (undated) DEVICE — SOLUTION IRRIG 1000ML 0.9% SOD CHL USP POUR PLAS BTL

## (undated) DEVICE — GUIDEWIRE VASC STR 3 CM 0.035 INX150 CM STD NIT ZIPWIRE

## (undated) DEVICE — NITINOL STONE RETRIEVAL BASKET: Brand: ESCAPE